# Patient Record
Sex: FEMALE | Race: WHITE | Employment: UNEMPLOYED | URBAN - METROPOLITAN AREA
[De-identification: names, ages, dates, MRNs, and addresses within clinical notes are randomized per-mention and may not be internally consistent; named-entity substitution may affect disease eponyms.]

---

## 2017-01-04 ENCOUNTER — TELEPHONE (OUTPATIENT)
Dept: ONCOLOGY | Facility: CLINIC | Age: 77
End: 2017-01-04

## 2017-01-04 RX ORDER — FUROSEMIDE 40 MG/1
40 TABLET ORAL 2 TIMES DAILY
Qty: 180 TABLET | Refills: 3 | Status: SHIPPED | OUTPATIENT
Start: 2017-01-04 | End: 2017-12-20 | Stop reason: SDUPTHER

## 2017-01-19 DIAGNOSIS — E11.40 TYPE 2 DIABETES MELLITUS WITH DIABETIC NEUROPATHY (HCC): ICD-10-CM

## 2017-01-23 ENCOUNTER — OFFICE VISIT (OUTPATIENT)
Dept: INTERNAL MEDICINE | Facility: CLINIC | Age: 77
End: 2017-01-23

## 2017-01-23 ENCOUNTER — CARE COORDINATION (OUTPATIENT)
Dept: CARE COORDINATION | Facility: CLINIC | Age: 77
End: 2017-01-23

## 2017-01-23 VITALS
BODY MASS INDEX: 27.29 KG/M2 | WEIGHT: 154 LBS | HEIGHT: 63 IN | DIASTOLIC BLOOD PRESSURE: 64 MMHG | RESPIRATION RATE: 14 BRPM | SYSTOLIC BLOOD PRESSURE: 100 MMHG

## 2017-01-23 DIAGNOSIS — E08.21 DIABETIC NEPHROPATHY ASSOCIATED WITH DIABETES MELLITUS DUE TO UNDERLYING CONDITION (HCC): ICD-10-CM

## 2017-01-23 DIAGNOSIS — E08.40 DIABETES MELLITUS DUE TO UNDERLYING CONDITION WITH DIABETIC NEUROPATHY, WITHOUT LONG-TERM CURRENT USE OF INSULIN (HCC): ICD-10-CM

## 2017-01-23 DIAGNOSIS — I48.20 CHRONIC ATRIAL FIBRILLATION (HCC): ICD-10-CM

## 2017-01-23 DIAGNOSIS — I50.42 HEART FAILURE, SYSTOLIC AND DIASTOLIC, CHRONIC (HCC): ICD-10-CM

## 2017-01-23 PROCEDURE — G8484 FLU IMMUNIZE NO ADMIN: HCPCS | Performed by: INTERNAL MEDICINE

## 2017-01-23 PROCEDURE — 1036F TOBACCO NON-USER: CPT | Performed by: INTERNAL MEDICINE

## 2017-01-23 PROCEDURE — 1090F PRES/ABSN URINE INCON ASSESS: CPT | Performed by: INTERNAL MEDICINE

## 2017-01-23 PROCEDURE — 4040F PNEUMOC VAC/ADMIN/RCVD: CPT | Performed by: INTERNAL MEDICINE

## 2017-01-23 PROCEDURE — 99213 OFFICE O/P EST LOW 20 MIN: CPT | Performed by: INTERNAL MEDICINE

## 2017-01-23 PROCEDURE — G8427 DOCREV CUR MEDS BY ELIG CLIN: HCPCS | Performed by: INTERNAL MEDICINE

## 2017-01-23 PROCEDURE — G8420 CALC BMI NORM PARAMETERS: HCPCS | Performed by: INTERNAL MEDICINE

## 2017-01-23 PROCEDURE — G8598 ASA/ANTIPLAT THER USED: HCPCS | Performed by: INTERNAL MEDICINE

## 2017-01-23 PROCEDURE — G8399 PT W/DXA RESULTS DOCUMENT: HCPCS | Performed by: INTERNAL MEDICINE

## 2017-01-23 PROCEDURE — 1123F ACP DISCUSS/DSCN MKR DOCD: CPT | Performed by: INTERNAL MEDICINE

## 2017-01-23 RX ORDER — AZITHROMYCIN 250 MG/1
TABLET, FILM COATED ORAL
Qty: 1 PACKET | Refills: 0 | Status: SHIPPED | OUTPATIENT
Start: 2017-01-23 | End: 2017-02-02

## 2017-01-23 RX ORDER — METOPROLOL SUCCINATE 25 MG/1
TABLET, EXTENDED RELEASE ORAL
COMMUNITY
Start: 2016-11-17 | End: 2017-08-17 | Stop reason: SDUPTHER

## 2017-01-23 RX ORDER — CEFUROXIME AXETIL 250 MG/1
250 TABLET ORAL 2 TIMES DAILY
Qty: 20 TABLET | Refills: 0 | Status: SHIPPED | OUTPATIENT
Start: 2017-01-23 | End: 2017-02-02

## 2017-01-23 ASSESSMENT — ENCOUNTER SYMPTOMS
EYES NEGATIVE: 1
ALLERGIC/IMMUNOLOGIC NEGATIVE: 1
SHORTNESS OF BREATH: 1

## 2017-02-21 ENCOUNTER — TELEPHONE (OUTPATIENT)
Dept: ONCOLOGY | Facility: CLINIC | Age: 77
End: 2017-02-21

## 2017-03-23 RX ORDER — SPIRONOLACTONE 25 MG/1
TABLET ORAL
Qty: 90 TABLET | Refills: 1 | Status: SHIPPED | OUTPATIENT
Start: 2017-03-23 | End: 2017-09-09 | Stop reason: SDUPTHER

## 2017-03-29 ENCOUNTER — CARE COORDINATION (OUTPATIENT)
Dept: CARE COORDINATION | Age: 77
End: 2017-03-29

## 2017-04-17 ENCOUNTER — OFFICE VISIT (OUTPATIENT)
Dept: INTERNAL MEDICINE CLINIC | Age: 77
End: 2017-04-17
Payer: MEDICARE

## 2017-04-17 ENCOUNTER — HOSPITAL ENCOUNTER (OUTPATIENT)
Age: 77
Setting detail: SPECIMEN
Discharge: HOME | End: 2017-04-17
Payer: MEDICARE

## 2017-04-17 VITALS
DIASTOLIC BLOOD PRESSURE: 62 MMHG | SYSTOLIC BLOOD PRESSURE: 100 MMHG | BODY MASS INDEX: 28.53 KG/M2 | RESPIRATION RATE: 14 BRPM | HEIGHT: 63 IN | WEIGHT: 161 LBS

## 2017-04-17 DIAGNOSIS — E11.40 TYPE 2 DIABETES MELLITUS WITH DIABETIC NEUROPATHY, WITHOUT LONG-TERM CURRENT USE OF INSULIN (HCC): Primary | ICD-10-CM

## 2017-04-17 DIAGNOSIS — I48.20 CHRONIC ATRIAL FIBRILLATION (HCC): ICD-10-CM

## 2017-04-17 DIAGNOSIS — I42.9 IDIOPATHIC CARDIOMYOPATHY (HCC): ICD-10-CM

## 2017-04-17 DIAGNOSIS — Z23 NEED FOR VACCINATION: ICD-10-CM

## 2017-04-17 PROBLEM — I48.91 ATRIAL FIBRILLATION (HCC): Status: ACTIVE | Noted: 2017-04-17

## 2017-04-17 LAB
CREATININE URINE: 59.3 MG/DL (ref 28–217)
MICROALBUMIN/CREAT 24H UR: 63 MG/L
MICROALBUMIN/CREAT UR-RTO: 106 MCG/MG CREAT

## 2017-04-17 PROCEDURE — G8598 ASA/ANTIPLAT THER USED: HCPCS | Performed by: INTERNAL MEDICINE

## 2017-04-17 PROCEDURE — G8420 CALC BMI NORM PARAMETERS: HCPCS | Performed by: INTERNAL MEDICINE

## 2017-04-17 PROCEDURE — 1036F TOBACCO NON-USER: CPT | Performed by: INTERNAL MEDICINE

## 2017-04-17 PROCEDURE — 1090F PRES/ABSN URINE INCON ASSESS: CPT | Performed by: INTERNAL MEDICINE

## 2017-04-17 PROCEDURE — 1123F ACP DISCUSS/DSCN MKR DOCD: CPT | Performed by: INTERNAL MEDICINE

## 2017-04-17 PROCEDURE — G0009 ADMIN PNEUMOCOCCAL VACCINE: HCPCS | Performed by: INTERNAL MEDICINE

## 2017-04-17 PROCEDURE — 99214 OFFICE O/P EST MOD 30 MIN: CPT | Performed by: INTERNAL MEDICINE

## 2017-04-17 PROCEDURE — 4040F PNEUMOC VAC/ADMIN/RCVD: CPT | Performed by: INTERNAL MEDICINE

## 2017-04-17 PROCEDURE — G8427 DOCREV CUR MEDS BY ELIG CLIN: HCPCS | Performed by: INTERNAL MEDICINE

## 2017-04-17 PROCEDURE — G8399 PT W/DXA RESULTS DOCUMENT: HCPCS | Performed by: INTERNAL MEDICINE

## 2017-04-17 PROCEDURE — 90670 PCV13 VACCINE IM: CPT | Performed by: INTERNAL MEDICINE

## 2017-04-17 RX ORDER — GLYBURIDE 2.5 MG/1
2.5 TABLET ORAL
Qty: 90 TABLET | Refills: 5 | Status: SHIPPED | OUTPATIENT
Start: 2017-04-17 | End: 2017-12-19 | Stop reason: ALTCHOICE

## 2017-04-17 ASSESSMENT — ENCOUNTER SYMPTOMS
SHORTNESS OF BREATH: 1
EYES NEGATIVE: 1
ALLERGIC/IMMUNOLOGIC NEGATIVE: 1

## 2017-04-26 ENCOUNTER — TELEPHONE (OUTPATIENT)
Dept: ONCOLOGY | Age: 77
End: 2017-04-26

## 2017-06-14 ENCOUNTER — CARE COORDINATION (OUTPATIENT)
Dept: CARE COORDINATION | Age: 77
End: 2017-06-14

## 2017-08-01 ENCOUNTER — OFFICE VISIT (OUTPATIENT)
Dept: INTERNAL MEDICINE CLINIC | Age: 77
End: 2017-08-01
Payer: MEDICARE

## 2017-08-01 VITALS
WEIGHT: 165 LBS | DIASTOLIC BLOOD PRESSURE: 62 MMHG | HEIGHT: 63 IN | BODY MASS INDEX: 29.23 KG/M2 | SYSTOLIC BLOOD PRESSURE: 104 MMHG

## 2017-08-01 DIAGNOSIS — I25.10 CORONARY ARTERY DISEASE INVOLVING NATIVE CORONARY ARTERY OF NATIVE HEART WITHOUT ANGINA PECTORIS: ICD-10-CM

## 2017-08-01 DIAGNOSIS — E11.40 TYPE 2 DIABETES MELLITUS WITH DIABETIC NEUROPATHY, WITHOUT LONG-TERM CURRENT USE OF INSULIN (HCC): Primary | ICD-10-CM

## 2017-08-01 DIAGNOSIS — N18.30 CKD (CHRONIC KIDNEY DISEASE) STAGE 3, GFR 30-59 ML/MIN (HCC): ICD-10-CM

## 2017-08-01 DIAGNOSIS — I50.42 CHRONIC COMBINED SYSTOLIC AND DIASTOLIC CONGESTIVE HEART FAILURE (HCC): ICD-10-CM

## 2017-08-01 PROCEDURE — 1036F TOBACCO NON-USER: CPT | Performed by: INTERNAL MEDICINE

## 2017-08-01 PROCEDURE — G8419 CALC BMI OUT NRM PARAM NOF/U: HCPCS | Performed by: INTERNAL MEDICINE

## 2017-08-01 PROCEDURE — G8427 DOCREV CUR MEDS BY ELIG CLIN: HCPCS | Performed by: INTERNAL MEDICINE

## 2017-08-01 PROCEDURE — 99214 OFFICE O/P EST MOD 30 MIN: CPT | Performed by: INTERNAL MEDICINE

## 2017-08-01 PROCEDURE — 1123F ACP DISCUSS/DSCN MKR DOCD: CPT | Performed by: INTERNAL MEDICINE

## 2017-08-01 PROCEDURE — 4040F PNEUMOC VAC/ADMIN/RCVD: CPT | Performed by: INTERNAL MEDICINE

## 2017-08-01 PROCEDURE — G8399 PT W/DXA RESULTS DOCUMENT: HCPCS | Performed by: INTERNAL MEDICINE

## 2017-08-01 PROCEDURE — G8598 ASA/ANTIPLAT THER USED: HCPCS | Performed by: INTERNAL MEDICINE

## 2017-08-01 PROCEDURE — 1090F PRES/ABSN URINE INCON ASSESS: CPT | Performed by: INTERNAL MEDICINE

## 2017-08-01 ASSESSMENT — ENCOUNTER SYMPTOMS
SHORTNESS OF BREATH: 1
ALLERGIC/IMMUNOLOGIC NEGATIVE: 1
EYES NEGATIVE: 1

## 2017-08-01 ASSESSMENT — PATIENT HEALTH QUESTIONNAIRE - PHQ9
2. FEELING DOWN, DEPRESSED OR HOPELESS: 0
SUM OF ALL RESPONSES TO PHQ9 QUESTIONS 1 & 2: 0
1. LITTLE INTEREST OR PLEASURE IN DOING THINGS: 0
SUM OF ALL RESPONSES TO PHQ QUESTIONS 1-9: 0

## 2017-08-14 ENCOUNTER — CARE COORDINATION (OUTPATIENT)
Dept: CARE COORDINATION | Age: 77
End: 2017-08-14

## 2017-08-17 RX ORDER — METOPROLOL SUCCINATE 25 MG/1
25 TABLET, EXTENDED RELEASE ORAL DAILY
Qty: 90 TABLET | Refills: 3 | Status: SHIPPED | OUTPATIENT
Start: 2017-08-17 | End: 2017-10-23 | Stop reason: SDUPTHER

## 2017-09-11 RX ORDER — SPIRONOLACTONE 25 MG/1
TABLET ORAL
Qty: 90 TABLET | Refills: 3 | Status: ON HOLD | OUTPATIENT
Start: 2017-09-11 | End: 2020-01-01 | Stop reason: HOSPADM

## 2017-09-25 ENCOUNTER — CARE COORDINATION (OUTPATIENT)
Dept: CARE COORDINATION | Age: 77
End: 2017-09-25

## 2017-09-26 ENCOUNTER — OFFICE VISIT (OUTPATIENT)
Dept: INTERNAL MEDICINE CLINIC | Age: 77
End: 2017-09-26
Payer: MEDICARE

## 2017-09-26 VITALS
WEIGHT: 166 LBS | HEIGHT: 63 IN | SYSTOLIC BLOOD PRESSURE: 122 MMHG | DIASTOLIC BLOOD PRESSURE: 84 MMHG | BODY MASS INDEX: 29.41 KG/M2

## 2017-09-26 DIAGNOSIS — M54.50 ACUTE RIGHT-SIDED LOW BACK PAIN WITHOUT SCIATICA: Primary | ICD-10-CM

## 2017-09-26 DIAGNOSIS — E03.9 ACQUIRED HYPOTHYROIDISM: ICD-10-CM

## 2017-09-26 DIAGNOSIS — I48.20 CHRONIC ATRIAL FIBRILLATION (HCC): ICD-10-CM

## 2017-09-26 DIAGNOSIS — I50.43 ACUTE ON CHRONIC COMBINED SYSTOLIC AND DIASTOLIC CONGESTIVE HEART FAILURE (HCC): ICD-10-CM

## 2017-09-26 PROCEDURE — G8417 CALC BMI ABV UP PARAM F/U: HCPCS | Performed by: INTERNAL MEDICINE

## 2017-09-26 PROCEDURE — 1090F PRES/ABSN URINE INCON ASSESS: CPT | Performed by: INTERNAL MEDICINE

## 2017-09-26 PROCEDURE — G8598 ASA/ANTIPLAT THER USED: HCPCS | Performed by: INTERNAL MEDICINE

## 2017-09-26 PROCEDURE — G8399 PT W/DXA RESULTS DOCUMENT: HCPCS | Performed by: INTERNAL MEDICINE

## 2017-09-26 PROCEDURE — 99214 OFFICE O/P EST MOD 30 MIN: CPT | Performed by: INTERNAL MEDICINE

## 2017-09-26 PROCEDURE — 4040F PNEUMOC VAC/ADMIN/RCVD: CPT | Performed by: INTERNAL MEDICINE

## 2017-09-26 PROCEDURE — G8427 DOCREV CUR MEDS BY ELIG CLIN: HCPCS | Performed by: INTERNAL MEDICINE

## 2017-09-26 PROCEDURE — 1123F ACP DISCUSS/DSCN MKR DOCD: CPT | Performed by: INTERNAL MEDICINE

## 2017-09-26 PROCEDURE — 1036F TOBACCO NON-USER: CPT | Performed by: INTERNAL MEDICINE

## 2017-09-26 ASSESSMENT — ENCOUNTER SYMPTOMS
SHORTNESS OF BREATH: 1
BACK PAIN: 1
ALLERGIC/IMMUNOLOGIC NEGATIVE: 1
EYES NEGATIVE: 1

## 2017-09-27 ENCOUNTER — CARE COORDINATION (OUTPATIENT)
Dept: CARE COORDINATION | Age: 77
End: 2017-09-27

## 2017-10-24 RX ORDER — METOPROLOL SUCCINATE 25 MG/1
25 TABLET, EXTENDED RELEASE ORAL DAILY
Qty: 90 TABLET | Refills: 3 | Status: SHIPPED | OUTPATIENT
Start: 2017-10-24 | End: 2018-10-01 | Stop reason: SDUPTHER

## 2017-10-31 RX ORDER — LEVOTHYROXINE SODIUM 0.05 MG/1
TABLET ORAL
Qty: 90 TABLET | Refills: 5 | Status: SHIPPED | OUTPATIENT
Start: 2017-10-31 | End: 2019-05-16 | Stop reason: SDUPTHER

## 2017-11-07 ENCOUNTER — OFFICE VISIT (OUTPATIENT)
Dept: INTERNAL MEDICINE CLINIC | Age: 77
End: 2017-11-07
Payer: MEDICARE

## 2017-11-07 ENCOUNTER — CARE COORDINATION (OUTPATIENT)
Dept: CARE COORDINATION | Age: 77
End: 2017-11-07

## 2017-11-07 VITALS
BODY MASS INDEX: 29.59 KG/M2 | HEIGHT: 63 IN | SYSTOLIC BLOOD PRESSURE: 114 MMHG | DIASTOLIC BLOOD PRESSURE: 72 MMHG | WEIGHT: 167 LBS

## 2017-11-07 DIAGNOSIS — K21.9 GASTROESOPHAGEAL REFLUX DISEASE WITHOUT ESOPHAGITIS: Primary | ICD-10-CM

## 2017-11-07 DIAGNOSIS — I42.9 IDIOPATHIC CARDIOMYOPATHY (HCC): ICD-10-CM

## 2017-11-07 DIAGNOSIS — I25.10 CORONARY ARTERY DISEASE INVOLVING NATIVE CORONARY ARTERY OF NATIVE HEART WITHOUT ANGINA PECTORIS: ICD-10-CM

## 2017-11-07 PROCEDURE — G8417 CALC BMI ABV UP PARAM F/U: HCPCS | Performed by: INTERNAL MEDICINE

## 2017-11-07 PROCEDURE — G8484 FLU IMMUNIZE NO ADMIN: HCPCS | Performed by: INTERNAL MEDICINE

## 2017-11-07 PROCEDURE — G8399 PT W/DXA RESULTS DOCUMENT: HCPCS | Performed by: INTERNAL MEDICINE

## 2017-11-07 PROCEDURE — 99214 OFFICE O/P EST MOD 30 MIN: CPT | Performed by: INTERNAL MEDICINE

## 2017-11-07 PROCEDURE — G8598 ASA/ANTIPLAT THER USED: HCPCS | Performed by: INTERNAL MEDICINE

## 2017-11-07 PROCEDURE — 4040F PNEUMOC VAC/ADMIN/RCVD: CPT | Performed by: INTERNAL MEDICINE

## 2017-11-07 PROCEDURE — 1036F TOBACCO NON-USER: CPT | Performed by: INTERNAL MEDICINE

## 2017-11-07 PROCEDURE — 1090F PRES/ABSN URINE INCON ASSESS: CPT | Performed by: INTERNAL MEDICINE

## 2017-11-07 PROCEDURE — 1123F ACP DISCUSS/DSCN MKR DOCD: CPT | Performed by: INTERNAL MEDICINE

## 2017-11-07 PROCEDURE — G8427 DOCREV CUR MEDS BY ELIG CLIN: HCPCS | Performed by: INTERNAL MEDICINE

## 2017-11-07 ASSESSMENT — ENCOUNTER SYMPTOMS
EYES NEGATIVE: 1
SHORTNESS OF BREATH: 1
ALLERGIC/IMMUNOLOGIC NEGATIVE: 1

## 2017-11-07 NOTE — PROGRESS NOTES
Neck: Neck supple. No hepatojugular reflux present. Carotid bruit is not present. No thyroid mass and no thyromegaly present. Cardiovascular: Normal rate, regular rhythm and normal heart sounds. Exam reveals no S3. No murmur heard. Pulmonary/Chest: Effort normal and breath sounds normal. She has no wheezes. She has no rales. Abdominal: Soft. Musculoskeletal: Normal range of motion. She exhibits no edema or tenderness. Lymphadenopathy:     She has no cervical adenopathy. She has no axillary adenopathy. Right: No supraclavicular and no epitrochlear adenopathy present. Left: No supraclavicular and no epitrochlear adenopathy present. Neurological: She is alert and oriented to person, place, and time. She displays no atrophy and no tremor. No cranial nerve deficit. She exhibits normal muscle tone. She displays no seizure activity. Coordination and gait normal.   Skin: Skin is warm and dry. No bruising, no laceration, no petechiae and no rash noted. She is not diaphoretic. No cyanosis or erythema. No pallor. Nails show no clubbing. Bruise right calf   Psychiatric: She has a normal mood and affect. Her speech is normal and behavior is normal. Judgment and thought content normal. Cognition and memory are normal.   Vitals reviewed. Assessment / Plan:     1. Gastroesophageal reflux disease without esophagitis     2. Coronary artery disease involving native coronary artery of native heart without angina pectoris     3. Idiopathic cardiomyopathy ([facility] [email or web address]       Return in about 8 weeks (around 1/2/2018) for Follow Up, diabetes mellitus. No orders of the defined types were placed in this encounter. No orders of the defined types were placed in this encounter.

## 2017-11-07 NOTE — CARE COORDINATION
Admission medications    Medication Sig Start Date End Date Taking? Authorizing Provider   levothyroxine (SYNTHROID) 50 MCG tablet TAKE 1 TABLET EVERY MORNING 10/31/17   Nini Multani MD   metoprolol succinate (TOPROL XL) 25 MG extended release tablet Take 1 tablet by mouth daily 10/24/17   Nini Multani MD   spironolactone (ALDACTONE) 25 MG tablet TAKE 1 TABLET EVERY MORNING 9/11/17   Nini Multani MD   glyBURIDE (DIABETA) 2.5 MG tablet Take 1 tablet by mouth daily (with breakfast) 4/17/17   Nini Multani MD   metFORMIN (GLUCOPHAGE) 500 MG tablet TAKE ONE-HALF (1/2) TABLET TWICE A DAY 1/19/17   Nini Multani MD   furosemide (LASIX) 40 MG tablet Take 1 tablet by mouth 2 times daily 1/4/17   Nini Multani MD   warfarin (COUMADIN) 3 MG tablet TAKE 1 TABLET EVERY OTHER DAY AS DIRECTED 12/13/16   Nini Multani MD   sotalol (BETAPACE) 80 MG tablet Take 0.5 tablets by mouth 2 times daily 12/13/16   Nini Multani MD   warfarin (COUMADIN) 1 MG tablet Take 2 tablets by mouth daily 8/31/16   Zach Zaragoza MD   docusate sodium (COLACE) 100 MG capsule Take 100 mg by mouth every morning     Historical Provider, MD   carvedilol (COREG) 3.125 MG tablet Take 1 tablet by mouth 2 times daily 6/2/16   Nini Multani MD   aspirin 81 MG tablet Take 81 mg by mouth every morning     Historical Provider, MD       No future appointments.      Cristi Galvez RN  Ambulatory Care Coordinator

## 2017-12-04 DIAGNOSIS — Z00.00 ROUTINE HEALTH MAINTENANCE: ICD-10-CM

## 2017-12-04 DIAGNOSIS — Z12.31 ENCOUNTER FOR SCREENING MAMMOGRAM FOR MALIGNANT NEOPLASM OF BREAST: ICD-10-CM

## 2017-12-04 DIAGNOSIS — Z85.3 PERSONAL HISTORY OF BREAST CANCER: Primary | ICD-10-CM

## 2017-12-11 RX ORDER — WARFARIN SODIUM 1 MG/1
2 TABLET ORAL EVERY OTHER DAY
Qty: 90 TABLET | Refills: 3 | Status: SHIPPED | OUTPATIENT
Start: 2017-12-11 | End: 2018-10-23 | Stop reason: SDUPTHER

## 2017-12-11 NOTE — TELEPHONE ENCOUNTER
Medication: warfarin 2mg QOD per patient  Last visit: 11/7/17  Next visit: 1/23/2018  Last refill: 8/2016  Pharmacy: [facility]

## 2017-12-19 ENCOUNTER — TELEPHONE (OUTPATIENT)
Dept: INTERNAL MEDICINE CLINIC | Age: 77
End: 2017-12-19

## 2017-12-19 RX ORDER — GLIMEPIRIDE 2 MG/1
2 TABLET ORAL
Qty: 90 TABLET | Refills: 3 | Status: SHIPPED | OUTPATIENT
Start: 2017-12-19 | End: 2018-11-07 | Stop reason: SDUPTHER

## 2017-12-19 NOTE — TELEPHONE ENCOUNTER
Patient called, her insurance is no longer going to cover glyburide. They would like to switch to glimepiride.   Please advise    Will need new rx sent to [facility]

## 2017-12-20 ENCOUNTER — OFFICE VISIT (OUTPATIENT)
Dept: INTERNAL MEDICINE CLINIC | Age: 77
End: 2017-12-20
Payer: MEDICARE

## 2017-12-20 ENCOUNTER — CARE COORDINATION (OUTPATIENT)
Dept: CARE COORDINATION | Age: 77
End: 2017-12-20

## 2017-12-20 VITALS
HEART RATE: 69 BPM | OXYGEN SATURATION: 94 % | DIASTOLIC BLOOD PRESSURE: 60 MMHG | BODY MASS INDEX: 29.59 KG/M2 | SYSTOLIC BLOOD PRESSURE: 104 MMHG | WEIGHT: 167 LBS | HEIGHT: 63 IN

## 2017-12-20 DIAGNOSIS — J04.0 ACUTE LARYNGITIS: ICD-10-CM

## 2017-12-20 DIAGNOSIS — E03.9 ACQUIRED HYPOTHYROIDISM: ICD-10-CM

## 2017-12-20 DIAGNOSIS — I50.43 ACUTE ON CHRONIC COMBINED SYSTOLIC AND DIASTOLIC CONGESTIVE HEART FAILURE (HCC): Primary | ICD-10-CM

## 2017-12-20 PROBLEM — J06.9 URI (UPPER RESPIRATORY INFECTION): Status: ACTIVE | Noted: 2017-12-20

## 2017-12-20 PROCEDURE — G8427 DOCREV CUR MEDS BY ELIG CLIN: HCPCS | Performed by: INTERNAL MEDICINE

## 2017-12-20 PROCEDURE — 4040F PNEUMOC VAC/ADMIN/RCVD: CPT | Performed by: INTERNAL MEDICINE

## 2017-12-20 PROCEDURE — G8484 FLU IMMUNIZE NO ADMIN: HCPCS | Performed by: INTERNAL MEDICINE

## 2017-12-20 PROCEDURE — G8598 ASA/ANTIPLAT THER USED: HCPCS | Performed by: INTERNAL MEDICINE

## 2017-12-20 PROCEDURE — 99214 OFFICE O/P EST MOD 30 MIN: CPT | Performed by: INTERNAL MEDICINE

## 2017-12-20 PROCEDURE — G8417 CALC BMI ABV UP PARAM F/U: HCPCS | Performed by: INTERNAL MEDICINE

## 2017-12-20 PROCEDURE — 1123F ACP DISCUSS/DSCN MKR DOCD: CPT | Performed by: INTERNAL MEDICINE

## 2017-12-20 PROCEDURE — 1036F TOBACCO NON-USER: CPT | Performed by: INTERNAL MEDICINE

## 2017-12-20 PROCEDURE — G8399 PT W/DXA RESULTS DOCUMENT: HCPCS | Performed by: INTERNAL MEDICINE

## 2017-12-20 PROCEDURE — 1090F PRES/ABSN URINE INCON ASSESS: CPT | Performed by: INTERNAL MEDICINE

## 2017-12-20 RX ORDER — FUROSEMIDE 40 MG/1
TABLET ORAL
Qty: 180 TABLET | Refills: 3 | Status: SHIPPED | OUTPATIENT
Start: 2017-12-20 | End: 2018-11-06 | Stop reason: SDUPTHER

## 2017-12-20 RX ORDER — CEFUROXIME AXETIL 250 MG/1
250 TABLET ORAL 2 TIMES DAILY
Qty: 20 TABLET | Refills: 0 | Status: SHIPPED | OUTPATIENT
Start: 2017-12-20 | End: 2018-02-20 | Stop reason: ALTCHOICE

## 2017-12-20 ASSESSMENT — ENCOUNTER SYMPTOMS
SHORTNESS OF BREATH: 1
SORE THROAT: 1
COUGH: 1
SINUS PAIN: 1
EYES NEGATIVE: 1
ALLERGIC/IMMUNOLOGIC NEGATIVE: 1

## 2017-12-20 NOTE — CARE COORDINATION
Ambulatory Care Coordination Note  12/20/2017  CM Risk Score: 3  Brandon Mortality Risk Score: 23.1    ACC: Tawanna He RN    Summary Note: RNCC reviewed chart and encounters, noted Mrs. Ten Albarran had same day visit with Dr. Ameena Lomeli today for congestion, heart failure follow up. New medication Ceftin to start today, RNCC will follow up with Ihsan Garcia in 1 week for outcome/follow up. Care Coordination Interventions    Program Enrollment:  Complex Care  Referral from Primary Care Provider:  Yes  Suggested Interventions and Community Resources  No Identified Needs  Cardiac Rehab:  Completed  Diabetes Education:  Completed  Home Health Services:  Completed  [employer] Management Tools:  Completed (Comment: CHF and DM handouts.)  Other Services or Interventions:  goes to [address] and [address] [place] [address]. Goals Addressed     None          Prior to Admission medications    Medication Sig Start Date End Date Taking?  Authorizing Provider   cefUROXime (CEFTIN) 250 MG tablet Take 1 tablet by mouth 2 times daily 12/20/17   Lisseth Ramos MD   glimepiride (AMARYL) 2 MG tablet Take 1 tablet by mouth every morning (before breakfast) 12/19/17   Lisseth Ramos MD   warfarin (COUMADIN) 1 MG tablet Take 2 tablets by mouth every other day 12/11/17   Lisseth Ramos MD   levothyroxine (SYNTHROID) 50 MCG tablet TAKE 1 TABLET EVERY MORNING 10/31/17   Lisseth Ramos MD   metoprolol succinate (TOPROL XL) 25 MG extended release tablet Take 1 tablet by mouth daily 10/24/17   Lisseth Ramos MD   spironolactone (ALDACTONE) 25 MG tablet TAKE 1 TABLET EVERY MORNING 9/11/17   Lisseth Ramos MD   metFORMIN (GLUCOPHAGE) 500 MG tablet TAKE ONE-HALF (1/2) TABLET TWICE A DAY 1/19/17   Lisseth Ramos MD   furosemide (LASIX) 40 MG tablet Take 1 tablet by mouth 2 times daily 1/4/17   Lisseth Ramos MD   warfarin (COUMADIN) 3 MG tablet TAKE 1 TABLET EVERY OTHER DAY AS DIRECTED 12/13/16   Cordelia Eduardo Dylan Ellis MD   sotalol (BETAPACE) 80 MG tablet Take 0.5 tablets by mouth 2 times daily 12/13/16   Adam Hagan MD   docusate sodium (COLACE) 100 MG capsule Take 100 mg by mouth every morning     Historical Provider, MD   carvedilol (COREG) 3.125 MG tablet Take 1 tablet by mouth 2 times daily 6/2/16   Adam Hagan MD   aspirin 81 MG tablet Take 81 mg by mouth every morning     Historical Provider, MD       Future Appointments  Date Time Provider Russ Cardona   1/23/2018 1:30 PM Adam Hagan MD [facility]

## 2017-12-20 NOTE — PROGRESS NOTES
Right: No supraclavicular and no epitrochlear adenopathy present. Left: No supraclavicular and no epitrochlear adenopathy present. Neurological: She is alert and oriented to person, place, and time. She displays no atrophy and no tremor. No cranial nerve deficit. She exhibits normal muscle tone. She displays no seizure activity. Coordination and gait normal.   Skin: Skin is warm and dry. No bruising, no laceration, no petechiae and no rash noted. She is not diaphoretic. No cyanosis or erythema. No pallor. Nails show no clubbing. Bruise right calf   Psychiatric: She has a normal mood and affect. Her speech is normal and behavior is normal. Judgment and thought content normal. Cognition and memory are normal.   Vitals reviewed. Assessment / Plan:     1. Acute on chronic combined systolic and diastolic congestive heart failure ([address]     2. Acute laryngitis     3. Acquired hypothyroidism       Return previous, for Follow Up. No orders of the defined types were placed in this encounter.     Orders Placed This Encounter   Medications    cefUROXime (CEFTIN) 250 MG tablet     Sig: Take 1 tablet by mouth 2 times daily     Dispense:  20 tablet     Refill:  0

## 2017-12-26 ENCOUNTER — CARE COORDINATION (OUTPATIENT)
Dept: CARE COORDINATION | Age: 77
End: 2017-12-26

## 2017-12-29 RX ORDER — SOTALOL HYDROCHLORIDE 80 MG/1
TABLET ORAL
Qty: 90 TABLET | Refills: 3 | Status: ON HOLD | OUTPATIENT
Start: 2017-12-29 | End: 2020-01-01 | Stop reason: HOSPADM

## 2018-01-03 ENCOUNTER — CARE COORDINATION (OUTPATIENT)
Dept: CARE COORDINATION | Age: 78
End: 2018-01-03

## 2018-01-03 NOTE — CARE COORDINATION
spironolactone (ALDACTONE) 25 MG tablet TAKE 1 TABLET EVERY MORNING 9/11/17   Anne-Marie Loaiza MD   metFORMIN (GLUCOPHAGE) 500 MG tablet TAKE ONE-HALF (1/2) TABLET TWICE A DAY 1/19/17   Anne-Marie Loaiza MD   warfarin (COUMADIN) 3 MG tablet TAKE 1 TABLET EVERY OTHER DAY AS DIRECTED 12/13/16   Anne-Marie Loaiza MD   docusate sodium (COLACE) 100 MG capsule Take 100 mg by mouth every morning     Historical Provider, MD   carvedilol (COREG) 3.125 MG tablet Take 1 tablet by mouth 2 times daily 6/2/16   Anne-Marie Loaiza MD   aspirin 81 MG tablet Take 81 mg by mouth every morning     Historical Provider, MD       No future appointments. General Assessment    Do you have any symptoms that are causing concern?:  No      and   Congestive Heart Failure Assessment    Are you currently restricting fluids?:  1800cc  Do you understand a low sodium diet?:  Yes  Do you understand how to read food labels?:  Yes  How many restaurant meals do you eat per week?:  1-2  Do you salt your food before tasting it?:  No         Symptoms:   None:  Yes      Symptom course:  stable  Weight trend:  stable  Salt intake watch compared to last visit:  stable     ,   Diabetes Assessment    Medic Alert ID:  No  Meal Planning:  Avoidance of concentrated sweets   How often do you test your blood sugar?:  Daily   Do you have barriers with adherence to non-pharmacologic self-management interventions?  (Nutrition/Exercise/Self-Monitoring):  No   Have you ever had to go to the ED for symptoms of low blood sugar?:  No       No patient-reported symptoms      ,

## 2018-01-24 ENCOUNTER — CARE COORDINATION (OUTPATIENT)
Dept: CARE COORDINATION | Age: 78
End: 2018-01-24

## 2018-01-29 RX ORDER — WARFARIN SODIUM 3 MG/1
TABLET ORAL
Qty: 90 TABLET | Refills: 2 | Status: ON HOLD | OUTPATIENT
Start: 2018-01-29 | End: 2020-01-01

## 2018-02-14 ENCOUNTER — CARE COORDINATION (OUTPATIENT)
Dept: CARE COORDINATION | Age: 78
End: 2018-02-14

## 2018-02-20 ENCOUNTER — OFFICE VISIT (OUTPATIENT)
Dept: INTERNAL MEDICINE CLINIC | Age: 78
End: 2018-02-20
Payer: MEDICARE

## 2018-02-20 ENCOUNTER — HOSPITAL ENCOUNTER (OUTPATIENT)
Age: 78
Setting detail: SPECIMEN
Discharge: HOME | End: 2018-02-20
Payer: MEDICARE

## 2018-02-20 VITALS
HEIGHT: 63 IN | DIASTOLIC BLOOD PRESSURE: 60 MMHG | OXYGEN SATURATION: 94 % | HEART RATE: 78 BPM | WEIGHT: 163 LBS | SYSTOLIC BLOOD PRESSURE: 110 MMHG | BODY MASS INDEX: 28.88 KG/M2

## 2018-02-20 DIAGNOSIS — I42.9 IDIOPATHIC CARDIOMYOPATHY (HCC): ICD-10-CM

## 2018-02-20 DIAGNOSIS — E08.21 DIABETIC NEPHROPATHY ASSOCIATED WITH DIABETES MELLITUS DUE TO UNDERLYING CONDITION (HCC): ICD-10-CM

## 2018-02-20 DIAGNOSIS — E11.40 TYPE 2 DIABETES MELLITUS WITH DIABETIC NEUROPATHY, WITHOUT LONG-TERM CURRENT USE OF INSULIN (HCC): Primary | ICD-10-CM

## 2018-02-20 DIAGNOSIS — I50.20 SYSTOLIC CONGESTIVE HEART FAILURE, UNSPECIFIED CONGESTIVE HEART FAILURE CHRONICITY: ICD-10-CM

## 2018-02-20 DIAGNOSIS — C50.912 MALIGNANT NEOPLASM OF LEFT FEMALE BREAST, UNSPECIFIED ESTROGEN RECEPTOR STATUS, UNSPECIFIED SITE OF BREAST (HCC): ICD-10-CM

## 2018-02-20 DIAGNOSIS — I48.20 CHRONIC ATRIAL FIBRILLATION (HCC): ICD-10-CM

## 2018-02-20 DIAGNOSIS — I50.42 CHRONIC COMBINED SYSTOLIC AND DIASTOLIC CONGESTIVE HEART FAILURE (HCC): ICD-10-CM

## 2018-02-20 DIAGNOSIS — E08.41 DIABETIC MONONEUROPATHY ASSOCIATED WITH DIABETES MELLITUS DUE TO UNDERLYING CONDITION (HCC): ICD-10-CM

## 2018-02-20 DIAGNOSIS — E11.40 TYPE 2 DIABETES MELLITUS WITH DIABETIC NEUROPATHY, WITHOUT LONG-TERM CURRENT USE OF INSULIN (HCC): ICD-10-CM

## 2018-02-20 DIAGNOSIS — I50.9 ACUTE ON CHRONIC CONGESTIVE HEART FAILURE, UNSPECIFIED CONGESTIVE HEART FAILURE TYPE: ICD-10-CM

## 2018-02-20 LAB
ESTIMATED AVERAGE GLUCOSE: 134 MG/DL
HBA1C MFR BLD: 6.3 % (ref 4–6)

## 2018-02-20 PROCEDURE — 1090F PRES/ABSN URINE INCON ASSESS: CPT | Performed by: INTERNAL MEDICINE

## 2018-02-20 PROCEDURE — G8598 ASA/ANTIPLAT THER USED: HCPCS | Performed by: INTERNAL MEDICINE

## 2018-02-20 PROCEDURE — 4040F PNEUMOC VAC/ADMIN/RCVD: CPT | Performed by: INTERNAL MEDICINE

## 2018-02-20 PROCEDURE — G8417 CALC BMI ABV UP PARAM F/U: HCPCS | Performed by: INTERNAL MEDICINE

## 2018-02-20 PROCEDURE — 1123F ACP DISCUSS/DSCN MKR DOCD: CPT | Performed by: INTERNAL MEDICINE

## 2018-02-20 PROCEDURE — G8427 DOCREV CUR MEDS BY ELIG CLIN: HCPCS | Performed by: INTERNAL MEDICINE

## 2018-02-20 PROCEDURE — G8484 FLU IMMUNIZE NO ADMIN: HCPCS | Performed by: INTERNAL MEDICINE

## 2018-02-20 PROCEDURE — 99214 OFFICE O/P EST MOD 30 MIN: CPT | Performed by: INTERNAL MEDICINE

## 2018-02-20 PROCEDURE — G8399 PT W/DXA RESULTS DOCUMENT: HCPCS | Performed by: INTERNAL MEDICINE

## 2018-02-20 PROCEDURE — 1036F TOBACCO NON-USER: CPT | Performed by: INTERNAL MEDICINE

## 2018-02-20 ASSESSMENT — ENCOUNTER SYMPTOMS
EYES NEGATIVE: 1
ALLERGIC/IMMUNOLOGIC NEGATIVE: 1
SHORTNESS OF BREATH: 1
CONSTIPATION: 1

## 2018-02-20 NOTE — PROGRESS NOTES
Subjective:      Patient ID: Gustavo Madison is a 65 y.o. female. Congestive Heart Failure   Presents for follow-up (She had acute heart failure systolic and diastolic. She was admitted to [facility] and had a new wire for her pacemaker and defibrillator) visit. Associated symptoms include fatigue and shortness of breath. Pertinent negatives include no chest pain, edema, muscle weakness, near-syncope, nocturia or orthopnea. The symptoms have been improving. Compliance with total regimen is 0-25%. Compliance problems include adherence to diet. Compliance with diet is %. Compliance with exercise is %. Review of Systems   Constitutional: Positive for fatigue. HENT: Positive for sneezing. Eyes: Negative. Respiratory: Positive for shortness of breath. Cardiovascular: Negative for chest pain and near-syncope. Gastrointestinal: Positive for constipation. Endocrine: Negative. Genitourinary: Negative. Negative for nocturia. Musculoskeletal: Negative for muscle weakness. Allergic/Immunologic: Negative. Neurological: Negative. Objective:   Physical Exam   Constitutional: She is oriented to person, place, and time. She appears well-developed and well-nourished. She is active and cooperative. She does not have a sickly appearance. No distress. HENT:   Head: Normocephalic and atraumatic. Head is without abrasion, without contusion and without laceration. Right Ear: External ear normal.   Left Ear: External ear normal.   Nose: No mucosal edema, nose lacerations or septal deviation. Mouth/Throat: Mucous membranes are not pale. No oropharyngeal exudate or posterior oropharyngeal edema. Eyes: EOM are normal. Pupils are equal, round, and reactive to light. Right eye exhibits no discharge. Left eye exhibits no discharge. No scleral icterus. Neck: Neck supple. No hepatojugular reflux present. Carotid bruit is not present. No thyroid mass and no thyromegaly present. Cardiovascular: Normal rate, regular rhythm and normal heart sounds. Exam reveals no S3. No murmur heard. Pulmonary/Chest: Effort normal and breath sounds normal. She has no wheezes. She has no rales. Abdominal: Soft. Musculoskeletal: Normal range of motion. She exhibits no edema or tenderness. Lymphadenopathy:     She has no cervical adenopathy. She has no axillary adenopathy. Right: No supraclavicular and no epitrochlear adenopathy present. Left: No supraclavicular and no epitrochlear adenopathy present. Neurological: She is alert and oriented to person, place, and time. She displays no atrophy and no tremor. No cranial nerve deficit. She exhibits normal muscle tone. She displays no seizure activity. Coordination and gait normal.   Skin: Skin is warm and dry. No bruising, no laceration, no petechiae and no rash noted. She is not diaphoretic. No cyanosis or erythema. No pallor. Nails show no clubbing. Bruise right calf   Psychiatric: She has a normal mood and affect. Her speech is normal and behavior is normal. Judgment and thought content normal. Cognition and memory are normal.   Vitals reviewed. Assessment:      1. Type 2 diabetes mellitus with diabetic neuropathy, without long-term current use of insulin ([facility])  Hemoglobin A1C    MICROALBUMIN, RANDOM URINE (W/O CREATININE)   2. Malignant neoplasm of left female breast, unspecified estrogen receptor status, unspecified site of breast ([address]     3. Systolic congestive heart failure, unspecified congestive heart failure chronicity ([address]     4. Idiopathic cardiomyopathy ([address]     5. Chronic combined systolic and diastolic congestive heart failure ([address]     6. Acute on chronic congestive heart failure, unspecified congestive heart failure type ([address]     7. Chronic atrial fibrillation (HCC)     8. Diabetic nephropathy associated with diabetes mellitus due to underlying condition ([address]     9.  Diabetic mononeuropathy associated

## 2018-02-20 NOTE — PROGRESS NOTES
Subjective:      Patient ID: Kwadwo Hutchinson is a 65 y.o. female. Chronic Disease Visit Information    BP Readings from Last 3 Encounters:   18 110/60   17 104/60   17 114/72          Hemoglobin A1C (%)   Date Value   2017 6.0   2016 7.2 (H)   2016 5.2     Microalb/Crt. Ratio (mcg/mg creat)   Date Value   2017 106 (H)     LDL Cholesterol (mg/dL)   Date Value   2014 154 (H)     HDL (mg/dL)   Date Value   2014 77     BUN (mg/dL)   Date Value   2017 18     CREATININE (mg/dL)   Date Value   2017 1.23 (H)     Glucose (mg/dL)   Date Value   2017 102 (H)   2012 122 (H)            Have you changed or started any medications since your last visit including any over-the-counter medicines, vitamins, or herbal medicines? no   Are you having any side effects from any of your medications? -  no  Have you stopped taking any of your medications? Is so, why? -  no    Have you seen any other physician or provider since your last visit? No  Have you had any other diagnostic tests since your last visit? Yes - Records Obtained  Have you been seen in the emergency room and/or had an admission to a hospital since we last saw you? No  Have you had your annual diabetic retinal (eye) exam? Yes - Records Obtained  Have you had your routine dental cleaning in the past 6 months? yes - 2017    Have you activated your [employer] account? If not, what are your barriers?  No: Code      Patient Care Team:  Rohit Watt MD as PCP - Escobar Sneed MD as PCP - [facility] Attributed Provider  Andrey Gonzalez MD as Consulting Physician (Internal Medicine Cardiovascular Disease)  Lazara Saavedra MD as Consulting Physician (Internal Medicine)  Berenice Cobos RN as Care Coordinator         Medical History Review  Past Medical, Family, and Social History reviewed and does not contribute to the patient presenting condition    Health Maintenance   Topic Date Due    TSH testing  01/23/2018    Potassium monitoring  01/23/2018    Creatinine monitoring  01/23/2018    DTaP/Tdap/Td vaccine (1 - Tdap) 11/07/2018 (Originally 9/14/1959)    Lipid screen  01/16/2019    Zostavax vaccine  Addressed    DEXA (modify frequency per FRAX score)  Completed    Flu vaccine  Completed    Pneumococcal low/med risk  Completed     Chief Complaint   Patient presents with    Diabetes     management pt due for A1C check       HPI    Review of Systems    Objective:   Physical Exam    Assessment / Plan:   1. Malignant neoplasm of left female breast, unspecified estrogen receptor status, unspecified site of breast ([address]      2. Systolic congestive heart failure, unspecified congestive heart failure chronicity ([address]      3. Idiopathic cardiomyopathy ([address]      4. Type 2 diabetes mellitus with diabetic neuropathy, without long-term current use of insulin ([address]      5. Chronic combined systolic and diastolic congestive heart failure ([address]      6. Acute on chronic congestive heart failure, unspecified congestive heart failure type ([address]      7.  Chronic atrial fibrillation (HCC)

## 2018-02-28 ENCOUNTER — CARE COORDINATION (OUTPATIENT)
Dept: CARE COORDINATION | Age: 78
End: 2018-02-28

## 2018-03-30 ENCOUNTER — CARE COORDINATION (OUTPATIENT)
Dept: CARE COORDINATION | Age: 78
End: 2018-03-30

## 2018-04-11 PROBLEM — J06.9 URI (UPPER RESPIRATORY INFECTION): Status: RESOLVED | Noted: 2017-12-20 | Resolved: 2018-04-11

## 2018-04-18 ENCOUNTER — CARE COORDINATION (OUTPATIENT)
Dept: CARE COORDINATION | Age: 78
End: 2018-04-18

## 2018-05-21 ENCOUNTER — CARE COORDINATION (OUTPATIENT)
Dept: CARE COORDINATION | Age: 78
End: 2018-05-21

## 2018-05-23 ENCOUNTER — OFFICE VISIT (OUTPATIENT)
Dept: INTERNAL MEDICINE CLINIC | Age: 78
End: 2018-05-23
Payer: MEDICARE

## 2018-05-23 VITALS
SYSTOLIC BLOOD PRESSURE: 110 MMHG | WEIGHT: 165 LBS | DIASTOLIC BLOOD PRESSURE: 80 MMHG | HEART RATE: 54 BPM | HEIGHT: 63 IN | BODY MASS INDEX: 29.23 KG/M2 | OXYGEN SATURATION: 98 %

## 2018-05-23 DIAGNOSIS — I48.20 CHRONIC ATRIAL FIBRILLATION (HCC): ICD-10-CM

## 2018-05-23 DIAGNOSIS — Z95.810 ICD (IMPLANTABLE CARDIOVERTER-DEFIBRILLATOR) IN PLACE: Primary | ICD-10-CM

## 2018-05-23 DIAGNOSIS — E11.40 TYPE 2 DIABETES MELLITUS WITH DIABETIC NEUROPATHY, WITHOUT LONG-TERM CURRENT USE OF INSULIN (HCC): ICD-10-CM

## 2018-05-23 DIAGNOSIS — I50.43 ACUTE ON CHRONIC COMBINED SYSTOLIC AND DIASTOLIC CONGESTIVE HEART FAILURE (HCC): ICD-10-CM

## 2018-05-23 PROCEDURE — 1036F TOBACCO NON-USER: CPT | Performed by: INTERNAL MEDICINE

## 2018-05-23 PROCEDURE — 1123F ACP DISCUSS/DSCN MKR DOCD: CPT | Performed by: INTERNAL MEDICINE

## 2018-05-23 PROCEDURE — 99214 OFFICE O/P EST MOD 30 MIN: CPT | Performed by: INTERNAL MEDICINE

## 2018-05-23 PROCEDURE — 1090F PRES/ABSN URINE INCON ASSESS: CPT | Performed by: INTERNAL MEDICINE

## 2018-05-23 PROCEDURE — G8399 PT W/DXA RESULTS DOCUMENT: HCPCS | Performed by: INTERNAL MEDICINE

## 2018-05-23 PROCEDURE — G8427 DOCREV CUR MEDS BY ELIG CLIN: HCPCS | Performed by: INTERNAL MEDICINE

## 2018-05-23 PROCEDURE — G8598 ASA/ANTIPLAT THER USED: HCPCS | Performed by: INTERNAL MEDICINE

## 2018-05-23 PROCEDURE — 4040F PNEUMOC VAC/ADMIN/RCVD: CPT | Performed by: INTERNAL MEDICINE

## 2018-05-23 PROCEDURE — G8417 CALC BMI ABV UP PARAM F/U: HCPCS | Performed by: INTERNAL MEDICINE

## 2018-05-23 ASSESSMENT — PATIENT HEALTH QUESTIONNAIRE - PHQ9
1. LITTLE INTEREST OR PLEASURE IN DOING THINGS: 0
SUM OF ALL RESPONSES TO PHQ QUESTIONS 1-9: 0
SUM OF ALL RESPONSES TO PHQ9 QUESTIONS 1 & 2: 0
2. FEELING DOWN, DEPRESSED OR HOPELESS: 0

## 2018-05-24 ASSESSMENT — ENCOUNTER SYMPTOMS
CONSTIPATION: 0
ALLERGIC/IMMUNOLOGIC NEGATIVE: 1
SHORTNESS OF BREATH: 1
EYES NEGATIVE: 1

## 2018-06-05 ENCOUNTER — CARE COORDINATION (OUTPATIENT)
Dept: CARE COORDINATION | Age: 78
End: 2018-06-05

## 2018-06-21 ENCOUNTER — CARE COORDINATION (OUTPATIENT)
Dept: CARE COORDINATION | Age: 78
End: 2018-06-21

## 2018-06-25 ENCOUNTER — HOSPITAL ENCOUNTER (OUTPATIENT)
Age: 78
Setting detail: SPECIMEN
Discharge: HOME | End: 2018-06-25
Payer: MEDICARE

## 2018-06-25 ENCOUNTER — TELEPHONE (OUTPATIENT)
Dept: INTERNAL MEDICINE CLINIC | Age: 78
End: 2018-06-25

## 2018-06-25 DIAGNOSIS — R35.0 FREQUENT URINATION: Primary | ICD-10-CM

## 2018-06-25 DIAGNOSIS — R35.0 FREQUENT URINATION: ICD-10-CM

## 2018-06-25 LAB
-: ABNORMAL
AMORPHOUS: ABNORMAL
BACTERIA: ABNORMAL
BILIRUBIN URINE: NEGATIVE
CASTS UA: ABNORMAL /LPF (ref 0–8)
COLOR: YELLOW
COMMENT UA: ABNORMAL
CRYSTALS, UA: ABNORMAL /HPF
EPITHELIAL CELLS UA: ABNORMAL /HPF (ref 0–5)
GLUCOSE URINE: NEGATIVE
KETONES, URINE: NEGATIVE
LEUKOCYTE ESTERASE, URINE: ABNORMAL
MUCUS: ABNORMAL
NITRITE, URINE: NEGATIVE
OTHER OBSERVATIONS UA: ABNORMAL
PH UA: 7 (ref 5–8)
PROTEIN UA: NEGATIVE
RBC UA: ABNORMAL /HPF (ref 0–4)
RENAL EPITHELIAL, UA: ABNORMAL /HPF
SPECIFIC GRAVITY UA: 1.01 (ref 1–1.03)
TRICHOMONAS: ABNORMAL
TURBIDITY: ABNORMAL
URINE HGB: ABNORMAL
UROBILINOGEN, URINE: NORMAL
WBC UA: ABNORMAL /HPF (ref 0–5)
YEAST: ABNORMAL

## 2018-06-26 LAB
CULTURE: NORMAL
Lab: NORMAL
SPECIMEN DESCRIPTION: NORMAL
STATUS: NORMAL

## 2018-06-27 ENCOUNTER — TELEPHONE (OUTPATIENT)
Dept: INTERNAL MEDICINE CLINIC | Age: 78
End: 2018-06-27

## 2018-07-03 ENCOUNTER — CARE COORDINATION (OUTPATIENT)
Dept: CARE COORDINATION | Age: 78
End: 2018-07-03

## 2018-07-03 NOTE — CARE COORDINATION
Attempting to reach patient for a follow up care coordination call regarding any needs, questions or concerns.   Lazara Deloris, [facility]

## 2018-07-19 ENCOUNTER — CARE COORDINATION (OUTPATIENT)
Dept: CARE COORDINATION | Age: 78
End: 2018-07-19

## 2018-08-22 ENCOUNTER — CARE COORDINATION (OUTPATIENT)
Dept: CARE COORDINATION | Age: 78
End: 2018-08-22

## 2018-09-05 ENCOUNTER — OFFICE VISIT (OUTPATIENT)
Dept: INTERNAL MEDICINE CLINIC | Age: 78
End: 2018-09-05
Payer: MEDICARE

## 2018-09-05 VITALS
HEIGHT: 62 IN | BODY MASS INDEX: 29.63 KG/M2 | DIASTOLIC BLOOD PRESSURE: 74 MMHG | WEIGHT: 161 LBS | SYSTOLIC BLOOD PRESSURE: 122 MMHG

## 2018-09-05 DIAGNOSIS — R55 SYNCOPE AND COLLAPSE: ICD-10-CM

## 2018-09-05 DIAGNOSIS — I48.20 CHRONIC ATRIAL FIBRILLATION (HCC): Primary | ICD-10-CM

## 2018-09-05 DIAGNOSIS — I50.42 CHRONIC COMBINED SYSTOLIC AND DIASTOLIC CONGESTIVE HEART FAILURE (HCC): ICD-10-CM

## 2018-09-05 PROCEDURE — 1090F PRES/ABSN URINE INCON ASSESS: CPT | Performed by: INTERNAL MEDICINE

## 2018-09-05 PROCEDURE — G8598 ASA/ANTIPLAT THER USED: HCPCS | Performed by: INTERNAL MEDICINE

## 2018-09-05 PROCEDURE — G8427 DOCREV CUR MEDS BY ELIG CLIN: HCPCS | Performed by: INTERNAL MEDICINE

## 2018-09-05 PROCEDURE — 99214 OFFICE O/P EST MOD 30 MIN: CPT | Performed by: INTERNAL MEDICINE

## 2018-09-05 PROCEDURE — G8417 CALC BMI ABV UP PARAM F/U: HCPCS | Performed by: INTERNAL MEDICINE

## 2018-09-05 PROCEDURE — 1123F ACP DISCUSS/DSCN MKR DOCD: CPT | Performed by: INTERNAL MEDICINE

## 2018-09-05 PROCEDURE — 4040F PNEUMOC VAC/ADMIN/RCVD: CPT | Performed by: INTERNAL MEDICINE

## 2018-09-05 PROCEDURE — 1101F PT FALLS ASSESS-DOCD LE1/YR: CPT | Performed by: INTERNAL MEDICINE

## 2018-09-05 PROCEDURE — 1036F TOBACCO NON-USER: CPT | Performed by: INTERNAL MEDICINE

## 2018-09-05 PROCEDURE — G8399 PT W/DXA RESULTS DOCUMENT: HCPCS | Performed by: INTERNAL MEDICINE

## 2018-09-05 RX ORDER — NITROGLYCERIN 0.4 MG/1
TABLET SUBLINGUAL
Refills: 3 | COMMUNITY
Start: 2018-06-01 | End: 2018-09-05

## 2018-09-05 RX ORDER — CEPHALEXIN 500 MG/1
CAPSULE ORAL
Refills: 0 | COMMUNITY
Start: 2018-08-26 | End: 2018-11-06 | Stop reason: ALTCHOICE

## 2018-09-05 RX ORDER — NITROGLYCERIN 0.4 MG/1
0.4 TABLET SUBLINGUAL
COMMUNITY
Start: 2018-06-01 | End: 2020-01-01 | Stop reason: SDUPTHER

## 2018-09-05 ASSESSMENT — PATIENT HEALTH QUESTIONNAIRE - PHQ9
SUM OF ALL RESPONSES TO PHQ QUESTIONS 1-9: 0
1. LITTLE INTEREST OR PLEASURE IN DOING THINGS: 0
2. FEELING DOWN, DEPRESSED OR HOPELESS: 0
SUM OF ALL RESPONSES TO PHQ9 QUESTIONS 1 & 2: 0
SUM OF ALL RESPONSES TO PHQ QUESTIONS 1-9: 0

## 2018-09-05 ASSESSMENT — ENCOUNTER SYMPTOMS
SHORTNESS OF BREATH: 1
EYES NEGATIVE: 1
ALLERGIC/IMMUNOLOGIC NEGATIVE: 1
CONSTIPATION: 0

## 2018-09-05 NOTE — PROGRESS NOTES
Subjective:      Patient ID: Anabelle Isabel is a 65 y.o. female. Chronic Disease Visit Information    BP Readings from Last 3 Encounters:   05/23/18 110/80   02/20/18 110/60   12/20/17 104/60          Hemoglobin A1C (%)   Date Value   02/20/2018 6.3 (H)   01/23/2017 6.0   08/23/2016 7.2 (H)     Microalb/Crt. Ratio (mcg/mg creat)   Date Value   04/17/2017 106 (H)     LDL Cholesterol (mg/dL)   Date Value   01/16/2014 154 (H)     HDL (mg/dL)   Date Value   01/16/2014 77     BUN (mg/dL)   Date Value   01/23/2017 18     CREATININE (mg/dL)   Date Value   01/23/2017 1.23 (H)     Glucose (mg/dL)   Date Value   01/23/2017 102 (H)   02/17/2012 122 (H)            Have you changed or started any medications since your last visit including any over-the-counter medicines, vitamins, or herbal medicines? no   Are you having any side effects from any of your medications? -  no  Have you stopped taking any of your medications? Is so, why? -  no    Have you seen any other physician or provider since your last visit? Yes - Records Obtained  Have you had any other diagnostic tests since your last visit? Yes - Records Obtained  Have you been seen in the emergency room and/or had an admission to a hospital since we last saw you? Yes - Records Obtained  Have you had your annual diabetic retinal (eye) exam? No  Have you had your routine dental cleaning in the past 6 months? no    Have you activated your [employer] account? If not, what are your barriers?  No:      Patient Care Team:  González Castellanos MD as PCP - Chayito Kennedy MD as PCP - [facility] Attributed Provider  Sharon Swann MD as Consulting Physician (Internal Medicine Cardiovascular Disease)  Shaina Calixto MD as Consulting Physician (Internal Medicine)  Margaret Regan RN as Care Coordinator           Chief Complaint   Patient presents with    Diabetes    Hypertension    Hypothyroidism         Medical History Review  Past Medical, Family, and Social History reviewed and does contribute to the patient presenting condition    Health Maintenance   Topic Date Due    Shingles Vaccine (1 of 2 - 2 Dose Series) 09/14/1989    TSH testing  01/23/2018    Potassium monitoring  01/23/2018    Creatinine monitoring  01/23/2018    Flu vaccine (1) 09/01/2018    DTaP/Tdap/Td vaccine (1 - Tdap) 11/07/2018 (Originally 9/14/1959)    DEXA (modify frequency per FRAX score)  Completed    Pneumococcal low/med risk  Completed       She had syncope and AICD  Shock was delivered to her . She was seen at [facility]        Review of Systems   Constitutional: Negative for fatigue. HENT: Negative for sneezing. Eyes: Negative. Respiratory: Positive for shortness of breath. Cardiovascular: Positive for palpitations. Negative for chest pain. Syncope    Gastrointestinal: Negative for constipation. Endocrine: Negative. Genitourinary: Negative. Allergic/Immunologic: Negative. Neurological: Negative. Objective:   Physical Exam   Constitutional: She is oriented to person, place, and time. She appears well-developed and well-nourished. She is active and cooperative. She does not have a sickly appearance. No distress. HENT:   Head: Normocephalic and atraumatic. Head is without abrasion, without contusion and without laceration. Right Ear: External ear normal.   Left Ear: External ear normal.   Nose: No mucosal edema, nose lacerations or septal deviation. Mouth/Throat: Mucous membranes are not pale. No oropharyngeal exudate or posterior oropharyngeal edema. Eyes: Pupils are equal, round, and reactive to light. EOM are normal. Right eye exhibits no discharge. Left eye exhibits no discharge. No scleral icterus. Neck: Neck supple. No hepatojugular reflux present. Carotid bruit is not present. No thyroid mass and no thyromegaly present. Cardiovascular: Normal rate and normal heart sounds. An irregularly irregular rhythm present. Exam reveals no S3.     No murmur heard.  Apical rate 84/ minute    Pulmonary/Chest: Effort normal and breath sounds normal. She has no wheezes. She has no rales. Abdominal: Soft. Musculoskeletal: Normal range of motion. She exhibits no edema or tenderness. Lymphadenopathy:     She has no cervical adenopathy. She has no axillary adenopathy. Right: No supraclavicular and no epitrochlear adenopathy present. Left: No supraclavicular and no epitrochlear adenopathy present. Neurological: She is alert and oriented to person, place, and time. She displays no atrophy and no tremor. No cranial nerve deficit. She exhibits normal muscle tone. She displays no seizure activity. Coordination and gait normal.   Skin: Skin is warm and dry. No bruising, no laceration, no petechiae and no rash noted. She is not diaphoretic. No cyanosis or erythema. No pallor. Nails show no clubbing. Bruise right calf   Psychiatric: She has a normal mood and affect. Her speech is normal and behavior is normal. Judgment and thought content normal. Cognition and memory are normal.   Vitals reviewed. Assessment / Plan:      Diagnosis Orders   1. Chronic atrial fibrillation ([address]     2. Chronic combined systolic and diastolic congestive heart failure ([address]  Well  Compensated    3. Syncope and collapse  She was seen by  [facility] cardiology and her device is working normal      No Follow-up on file. No orders of the defined types were placed in this encounter. No orders of the defined types were placed in this encounter.

## 2018-09-11 ENCOUNTER — CARE COORDINATION (OUTPATIENT)
Dept: CARE COORDINATION | Age: 78
End: 2018-09-11

## 2018-09-11 NOTE — CARE COORDINATION
Ambulatory Care Coordination Note  9/11/2018  CM Risk Score: 4  Brandon Mortality Risk Score: 23.1    ACC: Juan Wilhelm RN    Summary Note: Spoke with patient who denied any needs from PCP or CC at this time. Patient stated \"I'm doing pretty darn good\". CC asked patient if she would like to move up he next PCP appointment d/t her recent admission to [facility], she declined. CC encouraged patient to call with any needs before her next appointment. Patient stated she has all of her medications and no concerns. CC Plan:   Follow up in 3 weeks for any needs. Diabetes Assessment    Medic Alert ID:  No  Meal Planning:  Avoidance of concentrated sweets   How often do you test your blood sugar?:  Daily   Do you have barriers with adherence to non-pharmacologic self-management interventions? (Nutrition/Exercise/Self-Monitoring):  No   Have you ever had to go to the ED for symptoms of low blood sugar?:  No       No patient-reported symptoms       and   Congestive Heart Failure Assessment    Are you currently restricting fluids?:  1800cc  Do you understand a low sodium diet?:  Yes  Do you understand how to read food labels?:  Yes  How many restaurant meals do you eat per week?:  1-2  Do you salt your food before tasting it?:  No     No patient-reported symptoms      Symptoms:   None:  Yes      Symptom course:  stable  Weight trend:  stable  Salt intake watch compared to last visit:  stable             Care Coordination Interventions    Program Enrollment:  Rising Risk  Referral from Primary Care Provider:  No  Suggested Interventions and Community Resources         Goals Addressed             Most Recent     Self Monitoring   On track (9/11/2018)             Daily Weights - I will weight myself as directed - Daily and write down weights  I will notify my provider of any increase in weight by 3 or more pounds in 2 days OR 5 or more pounds in a week.     Patient Reported Weight   Patient Reported Weights 6/14/2017 Weight 160 lb       Barriers: time constraints  Plan for overcoming my barriers: will try to do every morning. Confidence: 7/10  Anticipated Goal Completion Date: 8/30/17              Prior to Admission medications    Medication Sig Start Date End Date Taking?  Authorizing Provider   cephALEXin (KEFLEX) 500 MG capsule TK ONE C PO BID FOR 10 DAYS 8/26/18   Historical Provider, MD   nitroGLYCERIN (NITROSTAT) 0.4 MG SL tablet Place 0.4 mg under the tongue 6/1/18   Historical Provider, MD   warfarin (COUMADIN) 3 MG tablet TAKE 1 TABLET EVERY OTHER DAY AS DIRECTED 1/29/18   Kaylyn Escoto MD   sotalol (BETAPACE) 80 MG tablet TAKE ONE-HALF (1/2) TABLET TWICE A DAY  Patient taking differently: TAKE ONE TABLET TWICE A DAY 12/29/17   Billie Musa MD   furosemide (LASIX) 40 MG tablet TAKE 1 TABLET TWICE A DAY 12/20/17   Kaylyn Escoto MD   glimepiride (AMARYL) 2 MG tablet Take 1 tablet by mouth every morning (before breakfast) 12/19/17   Kaylyn Escoto MD   warfarin (COUMADIN) 1 MG tablet Take 2 tablets by mouth every other day 12/11/17   Kaylyn Escoto MD   levothyroxine (SYNTHROID) 50 MCG tablet TAKE 1 TABLET EVERY MORNING 10/31/17   Kaylyn Escoto MD   metoprolol succinate (TOPROL XL) 25 MG extended release tablet Take 1 tablet by mouth daily 10/24/17   Kaylyn Escoto MD   spironolactone (ALDACTONE) 25 MG tablet TAKE 1 TABLET EVERY MORNING 9/11/17   Kaylyn Escoto MD   docusate sodium (COLACE) 100 MG capsule Take 100 mg by mouth every morning     Historical Provider, MD   carvedilol (COREG) 3.125 MG tablet Take 1 tablet by mouth 2 times daily 6/2/16   Kaylyn Escoto MD   aspirin 81 MG tablet Take 81 mg by mouth every morning     Historical Provider, MD       Future Appointments  Date Time Provider Russ Cardona   11/28/2018 12:15 PM Kaylyn Escoto MD [address]

## 2018-10-01 RX ORDER — METOPROLOL SUCCINATE 25 MG/1
TABLET, EXTENDED RELEASE ORAL
Qty: 90 TABLET | Refills: 3 | Status: SHIPPED | OUTPATIENT
Start: 2018-10-01 | End: 2019-01-01 | Stop reason: SDUPTHER

## 2018-10-02 ENCOUNTER — CARE COORDINATION (OUTPATIENT)
Dept: CARE COORDINATION | Age: 78
End: 2018-10-02

## 2018-10-15 ENCOUNTER — CARE COORDINATION (OUTPATIENT)
Dept: CARE COORDINATION | Age: 78
End: 2018-10-15

## 2018-10-23 RX ORDER — WARFARIN SODIUM 1 MG/1
TABLET ORAL
Qty: 90 TABLET | Refills: 3 | Status: ON HOLD | OUTPATIENT
Start: 2018-10-23 | End: 2020-01-01 | Stop reason: DRUGHIGH

## 2018-10-29 ENCOUNTER — TELEPHONE (OUTPATIENT)
Dept: ONCOLOGY | Age: 78
End: 2018-10-29

## 2018-10-30 NOTE — TELEPHONE ENCOUNTER
Lost to oncology. Requested Kelsi Armstrong MA to contact patient to see if she is following with Dr. Drew Holder, or if she would like to follow up with [address] [place] [address]. Signed off navigation at this time.

## 2018-11-06 ENCOUNTER — OFFICE VISIT (OUTPATIENT)
Dept: INTERNAL MEDICINE CLINIC | Age: 78
End: 2018-11-06
Payer: MEDICARE

## 2018-11-06 ENCOUNTER — CARE COORDINATION (OUTPATIENT)
Dept: CARE COORDINATION | Age: 78
End: 2018-11-06

## 2018-11-06 VITALS
SYSTOLIC BLOOD PRESSURE: 114 MMHG | WEIGHT: 160.94 LBS | HEIGHT: 62 IN | DIASTOLIC BLOOD PRESSURE: 62 MMHG | BODY MASS INDEX: 29.62 KG/M2

## 2018-11-06 DIAGNOSIS — I50.23 ACUTE ON CHRONIC SYSTOLIC HEART FAILURE (HCC): ICD-10-CM

## 2018-11-06 DIAGNOSIS — N18.30 CKD (CHRONIC KIDNEY DISEASE) STAGE 3, GFR 30-59 ML/MIN (HCC): ICD-10-CM

## 2018-11-06 DIAGNOSIS — I48.20 CHRONIC ATRIAL FIBRILLATION (HCC): ICD-10-CM

## 2018-11-06 DIAGNOSIS — I50.40 COMBINED SYSTOLIC AND DIASTOLIC CONGESTIVE HEART FAILURE, UNSPECIFIED HF CHRONICITY (HCC): Primary | ICD-10-CM

## 2018-11-06 PROCEDURE — 1111F DSCHRG MED/CURRENT MED MERGE: CPT | Performed by: INTERNAL MEDICINE

## 2018-11-06 PROCEDURE — 99215 OFFICE O/P EST HI 40 MIN: CPT | Performed by: INTERNAL MEDICINE

## 2018-11-06 RX ORDER — FUROSEMIDE 40 MG/1
40 TABLET ORAL 2 TIMES DAILY
Qty: 180 TABLET | Refills: 3 | Status: SHIPPED | OUTPATIENT
Start: 2018-11-06 | End: 2018-11-21 | Stop reason: SDUPTHER

## 2018-11-06 ASSESSMENT — ENCOUNTER SYMPTOMS
SHORTNESS OF BREATH: 1
ALLERGIC/IMMUNOLOGIC NEGATIVE: 1
EYES NEGATIVE: 1
CONSTIPATION: 0

## 2018-11-06 ASSESSMENT — PATIENT HEALTH QUESTIONNAIRE - PHQ9
2. FEELING DOWN, DEPRESSED OR HOPELESS: 0
SUM OF ALL RESPONSES TO PHQ QUESTIONS 1-9: 0
1. LITTLE INTEREST OR PLEASURE IN DOING THINGS: 0
SUM OF ALL RESPONSES TO PHQ9 QUESTIONS 1 & 2: 0
SUM OF ALL RESPONSES TO PHQ QUESTIONS 1-9: 0

## 2018-11-06 NOTE — PROGRESS NOTES
Subjective:      Patient ID: Shirley Santana is a 77 y.o. female. Chronic Disease Visit Information    BP Readings from Last 3 Encounters:   09/05/18 122/74   05/23/18 110/80   02/20/18 110/60          Hemoglobin A1C (%)   Date Value   02/20/2018 6.3 (H)   01/23/2017 6.0   08/23/2016 7.2 (H)     Microalb/Crt. Ratio (mcg/mg creat)   Date Value   04/17/2017 106 (H)     LDL Cholesterol (mg/dL)   Date Value   01/16/2014 154 (H)     HDL (mg/dL)   Date Value   01/16/2014 77     BUN (mg/dL)   Date Value   01/23/2017 18     CREATININE (mg/dL)   Date Value   01/23/2017 1.23 (H)     Glucose (mg/dL)   Date Value   01/23/2017 102 (H)   02/17/2012 122 (H)            Have you changed or started any medications since your last visit including any over-the-counter medicines, vitamins, or herbal medicines? no   Are you having any side effects from any of your medications? -  no  Have you stopped taking any of your medications? Is so, why? -  no    Have you seen any other physician or provider since your last visit? Yes - Records Obtained  Have you had any other diagnostic tests since your last visit? Yes - Records Obtained  Have you been seen in the emergency room and/or had an admission to a hospital since we last saw you? Yes - Records Obtained  Have you had your annual diabetic retinal (eye) exam? No  Have you had your routine dental cleaning in the past 6 months? yes -     Have you activated your [employer] account? If not, what are your barriers? No:      Patient Care Team:  Kevin Gresham MD as PCP - Carol Rojas MD as PCP - S Attributed Provider  Otilia Fry MD as Consulting Physician (Internal Medicine Cardiovascular Disease)  Skyler Holliday MD as Consulting Physician (Internal Medicine)  Neli Bustamante RN as Care Coordinator     Chief Complaint   Patient presents with    Follow-Up from [facility] x 1 night - CHF. pt states she is feeling better.                Medical History Review  Past

## 2018-11-06 NOTE — PROGRESS NOTES
3 Post-Discharge Transitional Care Management Services or Hospital Follow Up      Jacob Fields   YOB: 1940    Date of Office Visit:  11/6/2018  Date of Hospital Admission: 8/11/16  Date of Hospital Discharge: 8/11/16  Readmission Risk Score(high >=14%.  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)    Female cystocele 2-3    Pessary maintenance    Breast cancer, left ([address]    Personal history of breast cancer    GERD (gastroesophageal reflux disease)    Systolic CHF ([address]    CKD (chronic kidney disease) stage 3, GFR 30-59 ml/min ([address]    Idiopathic cardiomyopathy ([address]    HLD (hyperlipidemia)    CAD (coronary artery disease)    Hypertension    Type 2 diabetes mellitus with neurologic complication ([address]    Combined systolic and diastolic congestive heart failure ([address]    CHF exacerbation ([address]    Supratherapeutic INR    Congestive heart failure ([address]    Hypertensive heart disease    Acute on chronic combined systolic and diastolic congestive heart failure ([address]    Pacemaker    ICD (implantable cardioverter-defibrillator) in place    Acquired hypothyroidism    Chronic atrial fibrillation (HCC)    Low back pain    Syncope and collapse       Allergies   Allergen Reactions    Ciprofloxacin Itching    Penicillins Diarrhea    Statins     Sulfa Antibiotics        Medications listed as ordered at the time of discharge from hospital   Robbie Mccarty laceration, no petechiae and no rash noted. She is not diaphoretic. No cyanosis or erythema. No pallor. Nails show no clubbing. Bruise right calf   Psychiatric: She has a normal mood and affect. Her speech is normal and behavior is normal. Judgment and thought content normal. Cognition and memory are normal.   Vitals reviewed. Assessment/Plan:      Diagnosis Orders   1. Combined systolic and diastolic congestive heart failure, unspecified HF chronicity ([facility]     2. Chronic atrial fibrillation ([facility])     3. CKD (chronic kidney disease) stage 3, GFR 30-59 ml/min ([facility])     4.  Acute on chronic systolic heart failure ([facility]  OR DISCHARGE MEDS RECONCILED W/ CURRENT OUTPATIENT MED LIST           Coy medications were reviewed and she is double dose of lasix

## 2018-11-08 RX ORDER — GLIMEPIRIDE 2 MG/1
TABLET ORAL
Qty: 90 TABLET | Refills: 3 | Status: SHIPPED | OUTPATIENT
Start: 2018-11-08 | End: 2019-01-01 | Stop reason: SDUPTHER

## 2018-11-20 ENCOUNTER — CARE COORDINATION (OUTPATIENT)
Dept: CARE COORDINATION | Age: 78
End: 2018-11-20

## 2018-11-20 NOTE — CARE COORDINATION
Attempting to reach patient for a follow up care coordination call regarding any needs, questions or concerns.   Zelalem Mcclendon, [address]  Will follow up with patient a upcoming PCP appointment

## 2018-11-26 RX ORDER — FUROSEMIDE 40 MG/1
TABLET ORAL
Qty: 180 TABLET | Refills: 3 | Status: ON HOLD | OUTPATIENT
Start: 2018-11-26 | End: 2020-01-01 | Stop reason: HOSPADM

## 2018-11-28 ENCOUNTER — OFFICE VISIT (OUTPATIENT)
Dept: INTERNAL MEDICINE CLINIC | Age: 78
End: 2018-11-28
Payer: MEDICARE

## 2018-11-28 VITALS
BODY MASS INDEX: 29.44 KG/M2 | WEIGHT: 160 LBS | DIASTOLIC BLOOD PRESSURE: 72 MMHG | HEIGHT: 62 IN | SYSTOLIC BLOOD PRESSURE: 118 MMHG

## 2018-11-28 DIAGNOSIS — I42.9 IDIOPATHIC CARDIOMYOPATHY (HCC): Primary | ICD-10-CM

## 2018-11-28 DIAGNOSIS — E11.40 TYPE 2 DIABETES MELLITUS WITH DIABETIC NEUROPATHY, WITHOUT LONG-TERM CURRENT USE OF INSULIN (HCC): ICD-10-CM

## 2018-11-28 DIAGNOSIS — I50.40 COMBINED SYSTOLIC AND DIASTOLIC CONGESTIVE HEART FAILURE, UNSPECIFIED HF CHRONICITY (HCC): ICD-10-CM

## 2018-11-28 PROCEDURE — 1101F PT FALLS ASSESS-DOCD LE1/YR: CPT | Performed by: INTERNAL MEDICINE

## 2018-11-28 PROCEDURE — 1036F TOBACCO NON-USER: CPT | Performed by: INTERNAL MEDICINE

## 2018-11-28 PROCEDURE — 4040F PNEUMOC VAC/ADMIN/RCVD: CPT | Performed by: INTERNAL MEDICINE

## 2018-11-28 PROCEDURE — G8417 CALC BMI ABV UP PARAM F/U: HCPCS | Performed by: INTERNAL MEDICINE

## 2018-11-28 PROCEDURE — G8399 PT W/DXA RESULTS DOCUMENT: HCPCS | Performed by: INTERNAL MEDICINE

## 2018-11-28 PROCEDURE — G8482 FLU IMMUNIZE ORDER/ADMIN: HCPCS | Performed by: INTERNAL MEDICINE

## 2018-11-28 PROCEDURE — G8598 ASA/ANTIPLAT THER USED: HCPCS | Performed by: INTERNAL MEDICINE

## 2018-11-28 PROCEDURE — G8427 DOCREV CUR MEDS BY ELIG CLIN: HCPCS | Performed by: INTERNAL MEDICINE

## 2018-11-28 PROCEDURE — 99214 OFFICE O/P EST MOD 30 MIN: CPT | Performed by: INTERNAL MEDICINE

## 2018-11-28 PROCEDURE — 1090F PRES/ABSN URINE INCON ASSESS: CPT | Performed by: INTERNAL MEDICINE

## 2018-11-28 PROCEDURE — 1123F ACP DISCUSS/DSCN MKR DOCD: CPT | Performed by: INTERNAL MEDICINE

## 2018-11-28 ASSESSMENT — ENCOUNTER SYMPTOMS
SHORTNESS OF BREATH: 1
EYES NEGATIVE: 1
CONSTIPATION: 0
ALLERGIC/IMMUNOLOGIC NEGATIVE: 1

## 2018-11-28 ASSESSMENT — PATIENT HEALTH QUESTIONNAIRE - PHQ9
2. FEELING DOWN, DEPRESSED OR HOPELESS: 0
SUM OF ALL RESPONSES TO PHQ9 QUESTIONS 1 & 2: 0
SUM OF ALL RESPONSES TO PHQ QUESTIONS 1-9: 0
1. LITTLE INTEREST OR PLEASURE IN DOING THINGS: 0
SUM OF ALL RESPONSES TO PHQ QUESTIONS 1-9: 0

## 2018-11-28 NOTE — PROGRESS NOTES
Subjective:      Patient ID: Ailyn Mahmood is a 77 y.o. female. Chronic Disease Visit Information    BP Readings from Last 3 Encounters:   11/06/18 114/62   09/05/18 122/74   05/23/18 110/80          Hemoglobin A1C (%)   Date Value   02/20/2018 6.3 (H)   01/23/2017 6.0   08/23/2016 7.2 (H)     Microalb/Crt. Ratio (mcg/mg creat)   Date Value   04/17/2017 106 (H)     LDL Cholesterol (mg/dL)   Date Value   01/16/2014 154 (H)     HDL (mg/dL)   Date Value   01/16/2014 77     BUN (mg/dL)   Date Value   01/23/2017 18     CREATININE (mg/dL)   Date Value   01/23/2017 1.23 (H)     Glucose (mg/dL)   Date Value   01/23/2017 102 (H)   02/17/2012 122 (H)            Have you changed or started any medications since your last visit including any over-the-counter medicines, vitamins, or herbal medicines? no   Are you having any side effects from any of your medications? -  no  Have you stopped taking any of your medications? Is so, why? -  no    Have you seen any other physician or provider since your last visit? No  Have you had any other diagnostic tests since your last visit? No  Have you been seen in the emergency room and/or had an admission to a hospital since we last saw you? No  Have you had your annual diabetic retinal (eye) exam? No  Have you had your routine dental cleaning in the past 6 months? yes -     Have you activated your [employer] account? If not, what are your barriers?  No: pending     Patient Care Team:  Esteban Moreland MD as PCP - Paris Gee MD as PCP - [facility] Attributed Provider  Denita Singh MD as Consulting Physician (Internal Medicine Cardiovascular Disease)  Hawa Chawla MD as Consulting Physician (Internal Medicine)  Traci Hastings RN as Care Coordinator     Chief Complaint   Patient presents with    Hypertension    Congestive Heart Failure    Chronic Kidney Disease              Medical History Review  Past Medical, Family, and Social History reviewed and does contribute to the

## 2018-12-03 ENCOUNTER — CARE COORDINATION (OUTPATIENT)
Dept: CARE COORDINATION | Age: 78
End: 2018-12-03

## 2018-12-03 NOTE — CARE COORDINATION
Attempting to reach patient for a follow up care coordination call regarding any needs, questions or concerns.   Catrina Melendez, [address]

## 2019-01-01 ENCOUNTER — CARE COORDINATION (OUTPATIENT)
Dept: CARE COORDINATION | Age: 79
End: 2019-01-01

## 2019-01-01 ENCOUNTER — OFFICE VISIT (OUTPATIENT)
Dept: INTERNAL MEDICINE CLINIC | Age: 79
End: 2019-01-01
Payer: MEDICARE

## 2019-01-01 VITALS
BODY MASS INDEX: 30.18 KG/M2 | HEIGHT: 62 IN | WEIGHT: 164 LBS | DIASTOLIC BLOOD PRESSURE: 62 MMHG | SYSTOLIC BLOOD PRESSURE: 118 MMHG

## 2019-01-01 DIAGNOSIS — E78.5 HYPERLIPIDEMIA, UNSPECIFIED HYPERLIPIDEMIA TYPE: ICD-10-CM

## 2019-01-01 DIAGNOSIS — I25.10 CORONARY ARTERY DISEASE INVOLVING NATIVE CORONARY ARTERY OF NATIVE HEART WITHOUT ANGINA PECTORIS: ICD-10-CM

## 2019-01-01 DIAGNOSIS — Z23 NEED FOR VACCINATION: Primary | ICD-10-CM

## 2019-01-01 DIAGNOSIS — I42.9 IDIOPATHIC CARDIOMYOPATHY (HCC): ICD-10-CM

## 2019-01-01 DIAGNOSIS — I50.20 SYSTOLIC CONGESTIVE HEART FAILURE, UNSPECIFIED HF CHRONICITY (HCC): ICD-10-CM

## 2019-01-01 DIAGNOSIS — I50.40 COMBINED SYSTOLIC AND DIASTOLIC CONGESTIVE HEART FAILURE, UNSPECIFIED HF CHRONICITY (HCC): ICD-10-CM

## 2019-01-01 DIAGNOSIS — Z95.810 ICD (IMPLANTABLE CARDIOVERTER-DEFIBRILLATOR) IN PLACE: ICD-10-CM

## 2019-01-01 DIAGNOSIS — E11.40 TYPE 2 DIABETES MELLITUS WITH DIABETIC NEUROPATHY, WITHOUT LONG-TERM CURRENT USE OF INSULIN (HCC): ICD-10-CM

## 2019-01-01 PROCEDURE — 4040F PNEUMOC VAC/ADMIN/RCVD: CPT | Performed by: INTERNAL MEDICINE

## 2019-01-01 PROCEDURE — 1123F ACP DISCUSS/DSCN MKR DOCD: CPT | Performed by: INTERNAL MEDICINE

## 2019-01-01 PROCEDURE — G8417 CALC BMI ABV UP PARAM F/U: HCPCS | Performed by: INTERNAL MEDICINE

## 2019-01-01 PROCEDURE — 1036F TOBACCO NON-USER: CPT | Performed by: INTERNAL MEDICINE

## 2019-01-01 PROCEDURE — G8427 DOCREV CUR MEDS BY ELIG CLIN: HCPCS | Performed by: INTERNAL MEDICINE

## 2019-01-01 PROCEDURE — 90653 IIV ADJUVANT VACCINE IM: CPT | Performed by: INTERNAL MEDICINE

## 2019-01-01 PROCEDURE — 1090F PRES/ABSN URINE INCON ASSESS: CPT | Performed by: INTERNAL MEDICINE

## 2019-01-01 PROCEDURE — G0008 ADMIN INFLUENZA VIRUS VAC: HCPCS | Performed by: INTERNAL MEDICINE

## 2019-01-01 PROCEDURE — G8399 PT W/DXA RESULTS DOCUMENT: HCPCS | Performed by: INTERNAL MEDICINE

## 2019-01-01 PROCEDURE — 99214 OFFICE O/P EST MOD 30 MIN: CPT | Performed by: INTERNAL MEDICINE

## 2019-01-01 PROCEDURE — G8598 ASA/ANTIPLAT THER USED: HCPCS | Performed by: INTERNAL MEDICINE

## 2019-01-01 PROCEDURE — G8482 FLU IMMUNIZE ORDER/ADMIN: HCPCS | Performed by: INTERNAL MEDICINE

## 2019-01-01 RX ORDER — GLIMEPIRIDE 2 MG/1
TABLET ORAL
Qty: 90 TABLET | Refills: 4 | Status: SHIPPED | OUTPATIENT
Start: 2019-01-01 | End: 2020-01-01 | Stop reason: SINTOL

## 2019-01-01 RX ORDER — METOPROLOL SUCCINATE 25 MG/1
TABLET, EXTENDED RELEASE ORAL
Qty: 90 TABLET | Refills: 4 | Status: ON HOLD | OUTPATIENT
Start: 2019-01-01 | End: 2020-01-01 | Stop reason: HOSPADM

## 2019-01-01 RX ORDER — COLCHICINE 0.6 MG/1
0.6 TABLET, FILM COATED ORAL DAILY
Qty: 15 TABLET | Refills: 0 | Status: SHIPPED | OUTPATIENT
Start: 2019-01-01 | End: 2020-01-01 | Stop reason: ALTCHOICE

## 2019-01-01 SDOH — ECONOMIC STABILITY: TRANSPORTATION INSECURITY
IN THE PAST 12 MONTHS, HAS THE LACK OF TRANSPORTATION KEPT YOU FROM MEDICAL APPOINTMENTS OR FROM GETTING MEDICATIONS?: NO

## 2019-01-01 SDOH — ECONOMIC STABILITY: FOOD INSECURITY: WITHIN THE PAST 12 MONTHS, YOU WORRIED THAT YOUR FOOD WOULD RUN OUT BEFORE YOU GOT MONEY TO BUY MORE.: NEVER TRUE

## 2019-01-01 SDOH — ECONOMIC STABILITY: TRANSPORTATION INSECURITY
IN THE PAST 12 MONTHS, HAS LACK OF TRANSPORTATION KEPT YOU FROM MEETINGS, WORK, OR FROM GETTING THINGS NEEDED FOR DAILY LIVING?: NO

## 2019-01-01 SDOH — ECONOMIC STABILITY: INCOME INSECURITY: HOW HARD IS IT FOR YOU TO PAY FOR THE VERY BASICS LIKE FOOD, HOUSING, MEDICAL CARE, AND HEATING?: NOT HARD AT ALL

## 2019-01-01 SDOH — HEALTH STABILITY: MENTAL HEALTH: HOW OFTEN DO YOU HAVE A DRINK CONTAINING ALCOHOL?: NEVER

## 2019-01-01 SDOH — ECONOMIC STABILITY: FOOD INSECURITY: WITHIN THE PAST 12 MONTHS, THE FOOD YOU BOUGHT JUST DIDN'T LAST AND YOU DIDN'T HAVE MONEY TO GET MORE.: NEVER TRUE

## 2019-01-01 ASSESSMENT — ENCOUNTER SYMPTOMS
EYES NEGATIVE: 1
SHORTNESS OF BREATH: 1
ALLERGIC/IMMUNOLOGIC NEGATIVE: 1
CONSTIPATION: 0

## 2019-01-03 ENCOUNTER — CARE COORDINATION (OUTPATIENT)
Dept: CARE COORDINATION | Age: 79
End: 2019-01-03

## 2019-02-07 ENCOUNTER — CARE COORDINATION (OUTPATIENT)
Dept: CARE COORDINATION | Age: 79
End: 2019-02-07

## 2019-03-14 ENCOUNTER — CARE COORDINATION (OUTPATIENT)
Dept: CARE COORDINATION | Age: 79
End: 2019-03-14

## 2019-04-02 ENCOUNTER — CARE COORDINATION (OUTPATIENT)
Dept: INTERNAL MEDICINE CLINIC | Age: 79
End: 2019-04-02

## 2019-04-02 ENCOUNTER — OFFICE VISIT (OUTPATIENT)
Dept: INTERNAL MEDICINE CLINIC | Age: 79
End: 2019-04-02
Payer: MEDICARE

## 2019-04-02 VITALS
DIASTOLIC BLOOD PRESSURE: 78 MMHG | WEIGHT: 157 LBS | HEIGHT: 62 IN | BODY MASS INDEX: 28.89 KG/M2 | SYSTOLIC BLOOD PRESSURE: 124 MMHG

## 2019-04-02 DIAGNOSIS — N18.30 CHRONIC KIDNEY DISEASE, STAGE III (MODERATE) (HCC): ICD-10-CM

## 2019-04-02 DIAGNOSIS — I42.9 IDIOPATHIC CARDIOMYOPATHY (HCC): ICD-10-CM

## 2019-04-02 DIAGNOSIS — E11.40 TYPE 2 DIABETES MELLITUS WITH DIABETIC NEUROPATHY, WITHOUT LONG-TERM CURRENT USE OF INSULIN (HCC): ICD-10-CM

## 2019-04-02 DIAGNOSIS — I48.20 CHRONIC ATRIAL FIBRILLATION (HCC): ICD-10-CM

## 2019-04-02 DIAGNOSIS — I50.20 SYSTOLIC CONGESTIVE HEART FAILURE, UNSPECIFIED HF CHRONICITY (HCC): ICD-10-CM

## 2019-04-02 DIAGNOSIS — I25.10 CORONARY ARTERY DISEASE INVOLVING NATIVE CORONARY ARTERY OF NATIVE HEART WITHOUT ANGINA PECTORIS: Primary | ICD-10-CM

## 2019-04-02 DIAGNOSIS — I50.22 CHRONIC SYSTOLIC CONGESTIVE HEART FAILURE (HCC): ICD-10-CM

## 2019-04-02 DIAGNOSIS — I50.40 COMBINED SYSTOLIC AND DIASTOLIC CONGESTIVE HEART FAILURE, UNSPECIFIED HF CHRONICITY (HCC): ICD-10-CM

## 2019-04-02 LAB
AVERAGE GLUCOSE: NORMAL
HBA1C MFR BLD: 7.1 %

## 2019-04-02 PROCEDURE — G8399 PT W/DXA RESULTS DOCUMENT: HCPCS | Performed by: INTERNAL MEDICINE

## 2019-04-02 PROCEDURE — 1036F TOBACCO NON-USER: CPT | Performed by: INTERNAL MEDICINE

## 2019-04-02 PROCEDURE — 99214 OFFICE O/P EST MOD 30 MIN: CPT | Performed by: INTERNAL MEDICINE

## 2019-04-02 PROCEDURE — 1123F ACP DISCUSS/DSCN MKR DOCD: CPT | Performed by: INTERNAL MEDICINE

## 2019-04-02 PROCEDURE — G8427 DOCREV CUR MEDS BY ELIG CLIN: HCPCS | Performed by: INTERNAL MEDICINE

## 2019-04-02 PROCEDURE — 1090F PRES/ABSN URINE INCON ASSESS: CPT | Performed by: INTERNAL MEDICINE

## 2019-04-02 PROCEDURE — G8417 CALC BMI ABV UP PARAM F/U: HCPCS | Performed by: INTERNAL MEDICINE

## 2019-04-02 PROCEDURE — 4040F PNEUMOC VAC/ADMIN/RCVD: CPT | Performed by: INTERNAL MEDICINE

## 2019-04-02 PROCEDURE — G8598 ASA/ANTIPLAT THER USED: HCPCS | Performed by: INTERNAL MEDICINE

## 2019-04-02 ASSESSMENT — ENCOUNTER SYMPTOMS
ALLERGIC/IMMUNOLOGIC NEGATIVE: 1
SHORTNESS OF BREATH: 1
EYES NEGATIVE: 1
CONSTIPATION: 0

## 2019-04-02 ASSESSMENT — PATIENT HEALTH QUESTIONNAIRE - PHQ9
SUM OF ALL RESPONSES TO PHQ9 QUESTIONS 1 & 2: 0
SUM OF ALL RESPONSES TO PHQ QUESTIONS 1-9: 0
2. FEELING DOWN, DEPRESSED OR HOPELESS: 0
1. LITTLE INTEREST OR PLEASURE IN DOING THINGS: 0
SUM OF ALL RESPONSES TO PHQ QUESTIONS 1-9: 0

## 2019-04-02 NOTE — PROGRESS NOTES
Subjective:      Patient ID: Deanna Olivier is a 77 y.o. female. Chronic Disease Visit Information    BP Readings from Last 3 Encounters:   11/28/18 118/72   11/06/18 114/62   09/05/18 122/74          Hemoglobin A1C (%)   Date Value   02/20/2018 6.3 (H)   01/23/2017 6.0   08/23/2016 7.2 (H)     Microalb/Crt. Ratio (mcg/mg creat)   Date Value   04/17/2017 106 (H)     LDL Cholesterol (mg/dL)   Date Value   01/16/2014 154 (H)     HDL (mg/dL)   Date Value   01/16/2014 77     BUN (mg/dL)   Date Value   01/23/2017 18     CREATININE (mg/dL)   Date Value   01/23/2017 1.23 (H)     Glucose (mg/dL)   Date Value   01/23/2017 102 (H)   02/17/2012 122 (H)            Have you changed or started any medications since your last visit including any over-the-counter medicines, vitamins, or herbal medicines? no   Are you having any side effects from any of your medications? -  no  Have you stopped taking any of your medications? Is so, why? -  no    Have you seen any other physician or provider since your last visit? No  Have you had any other diagnostic tests since your last visit? No  Have you been seen in the emergency room and/or had an admission to a hospital since we last saw you? No  Have you had your annual diabetic retinal (eye) exam? No  Have you had your routine dental cleaning in the past 6 months? no    Have you activated your [employer] account? If not, what are your barriers?  No:      Patient Care Team:  Abilio Cadena MD as PCP - Juan David Aguilar MD as PCP - [facility] Attributed Provider  Florencio Baum MD as Consulting Physician (Internal Medicine Cardiovascular Disease)  Jaspal Chu MD as Consulting Physician (Internal Medicine)  Adwoa Gonzalez RN as Care Coordinator     Chief Complaint   Patient presents with    Atrial Fibrillation    Congestive Heart Failure    Other     risk assessment 7              Medical History Review  Past Medical, Family, and Social History reviewed and does contribute to the patient presenting condition    Health Maintenance   Topic Date Due    TSH testing  01/23/2018    Potassium monitoring  01/23/2018    Creatinine monitoring  01/23/2018    Shingles Vaccine (1 of 2) 11/07/2019 (Originally 9/14/1989)    DTaP/Tdap/Td vaccine (1 - Tdap) 12/12/2019 (Originally 9/14/1959)    DEXA (modify frequency per FRAX score)  Completed    Flu vaccine  Completed    Pneumococcal 65+ years Vaccine  Completed       Congestive Heart Failure   Presents for follow-up (she has no symptoms today) visit. Associated symptoms include shortness of breath. Pertinent negatives include no chest pain, edema, fatigue, muscle weakness, near-syncope, nocturia, orthopnea or palpitations. The symptoms have been improving. Compliance with total regimen is 0-25%. Compliance problems include adherence to diet. Compliance with diet is %. Compliance with exercise is %. Compliance with medications is %. Side effects of treatment include headaches, limb pain, rash, urinary, visual, GI discomfort and auditory. Review of Systems   Constitutional: Negative for fatigue. HENT: Negative for sneezing. Eyes: Negative. Respiratory: Positive for shortness of breath. Cardiovascular: Negative for chest pain, palpitations and near-syncope. Syncope    Gastrointestinal: Negative for constipation. Endocrine: Negative. Genitourinary: Negative. Negative for nocturia. Musculoskeletal: Negative for muscle weakness. Allergic/Immunologic: Negative. Neurological: Negative. Objective:   Physical Exam   Constitutional: She is oriented to person, place, and time. She appears well-developed and well-nourished. She is active and cooperative. She does not have a sickly appearance. No distress. HENT:   Head: Normocephalic and atraumatic. Head is without abrasion, without contusion and without laceration.    Right Ear: External ear normal.   Left Ear: External ear normal.   Nose: No mellitus with diabetic neuropathy, without long-term current use of insulin (HCC)  -     Hemoglobin A1C; Future    Chronic systolic congestive heart failure (HCC)    Systolic congestive heart failure, unspecified HF chronicity (HCC)    Idiopathic cardiomyopathy (HCC)    Chronic atrial fibrillation (HCC)    Chronic kidney disease, stage III (moderate) (HCC)    Coronary artery disease involving native coronary artery of native heart without angina pectoris    Combined systolic and diastolic congestive heart failure, unspecified HF chronicity ([facility]          Return in about 3 months (around 6/25/2019) for Follow Up, diabetes mellitus, heart failure. Orders Placed This Encounter   Procedures    Hemoglobin A1C     Standing Status:   Future     Standing Expiration Date:   4/2/2020     No orders of the defined types were placed in this encounter.   she is stable and has no complaints

## 2019-04-03 NOTE — CARE COORDINATION
Ambulatory Care Coordination Note  4/3/2019  CM Risk Score: 11  Br[place] Mortality Risk Score: 38    ACC: Haylie Costa RN    Summary Note: Met with patient who denied any needs from CC. Patient stated she does not feel that she needs follow up calls at this time, she is doing fine. PCP noted in his visit that she is stable with no complaints. CC will route to PCP for graduation despite CM risk of 11 but will be happy to assist in the future if there is a need. Care Coordination Interventions    Program Enrollment:  Rising Risk  Referral from Primary Care Provider:  No  Suggested Interventions and Community Resources  Disease Specific Clinic:  Completed (Comment: chf clinic)  Zone Management Tools:  Completed         Goals Addressed                 This Visit's Progress     Medication Management   On track     I will take my medication as directed. I will notify my provider of any problems with medications, like adverse effects or side effects. I will notify my provider/Care Coordinator if I am unable to afford my medications. I will notify my provider for advice before I stop taking any of my medication. Barriers: lack of education  Plan for overcoming my barriers: work with CC  Confidence: 6/10  Anticipated Goal Completion Date: 5.5.19              Prior to Admission medications    Medication Sig Start Date End Date Taking?  Authorizing Provider   furosemide (LASIX) 40 MG tablet TAKE 1 TABLET TWICE A DAY 11/26/18   Shahzad Lobo MD   glimepiride (AMARYL) 2 MG tablet TAKE 1 TABLET EVERY MORNING BEFORE BREAKFAST 11/8/18   Shahzad Lobo MD   warfarin (COUMADIN) 1 MG tablet TAKE 2 TABLETS EVERY OTHER DAY 10/23/18   Shahzad Lobo MD   metoprolol succinate (TOPROL XL) 25 MG extended release tablet TAKE 1 TABLET DAILY 10/1/18   Shahzad Lobo MD   nitroGLYCERIN (NITROSTAT) 0.4 MG SL tablet Place 0.4 mg under the tongue 6/1/18   Historical Provider, MD   warfarin (COUMADIN) 3 MG tablet TAKE 1

## 2019-05-06 ENCOUNTER — HOSPITAL ENCOUNTER (OUTPATIENT)
Age: 79
Setting detail: SPECIMEN
Discharge: HOME | End: 2019-05-06
Payer: MEDICARE

## 2019-05-06 ENCOUNTER — OFFICE VISIT (OUTPATIENT)
Dept: INTERNAL MEDICINE CLINIC | Age: 79
End: 2019-05-06
Payer: MEDICARE

## 2019-05-06 VITALS
SYSTOLIC BLOOD PRESSURE: 104 MMHG | WEIGHT: 159 LBS | OXYGEN SATURATION: 99 % | DIASTOLIC BLOOD PRESSURE: 52 MMHG | HEART RATE: 61 BPM | BODY MASS INDEX: 29.26 KG/M2 | HEIGHT: 62 IN

## 2019-05-06 DIAGNOSIS — M79.671 RIGHT FOOT PAIN: ICD-10-CM

## 2019-05-06 DIAGNOSIS — M79.89 SWELLING OF RIGHT FOOT: ICD-10-CM

## 2019-05-06 DIAGNOSIS — M79.671 RIGHT FOOT PAIN: Primary | ICD-10-CM

## 2019-05-06 LAB
ABSOLUTE EOS #: 0.09 K/UL (ref 0–0.44)
ABSOLUTE IMMATURE GRANULOCYTE: <0.03 K/UL (ref 0–0.3)
ABSOLUTE LYMPH #: 1.94 K/UL (ref 1.1–3.7)
ABSOLUTE MONO #: 0.66 K/UL (ref 0.1–1.2)
BASOPHILS # BLD: 1 % (ref 0–2)
BASOPHILS ABSOLUTE: 0.06 K/UL (ref 0–0.2)
DIFFERENTIAL TYPE: NORMAL
EOSINOPHILS RELATIVE PERCENT: 1 % (ref 1–4)
HCT VFR BLD CALC: 46.3 % (ref 36.3–47.1)
HEMOGLOBIN: 14.7 G/DL (ref 11.9–15.1)
IMMATURE GRANULOCYTES: 0 %
LYMPHOCYTES # BLD: 26 % (ref 24–43)
MCH RBC QN AUTO: 32.5 PG (ref 25.2–33.5)
MCHC RBC AUTO-ENTMCNC: 31.7 G/DL (ref 28.4–34.8)
MCV RBC AUTO: 102.4 FL (ref 82.6–102.9)
MONOCYTES # BLD: 9 % (ref 3–12)
NRBC AUTOMATED: 0 PER 100 WBC
PDW BLD-RTO: 12.4 % (ref 11.8–14.4)
PLATELET # BLD: 219 K/UL (ref 138–453)
PLATELET ESTIMATE: NORMAL
PMV BLD AUTO: 10.2 FL (ref 8.1–13.5)
RBC # BLD: 4.52 M/UL (ref 3.95–5.11)
RBC # BLD: NORMAL 10*6/UL
SEG NEUTROPHILS: 63 % (ref 36–65)
SEGMENTED NEUTROPHILS ABSOLUTE COUNT: 4.78 K/UL (ref 1.5–8.1)
URIC ACID: 10.5 MG/DL (ref 2.4–5.7)
WBC # BLD: 7.6 K/UL (ref 3.5–11.3)
WBC # BLD: NORMAL 10*3/UL

## 2019-05-06 PROCEDURE — 1123F ACP DISCUSS/DSCN MKR DOCD: CPT | Performed by: PHYSICIAN ASSISTANT

## 2019-05-06 PROCEDURE — 1036F TOBACCO NON-USER: CPT | Performed by: PHYSICIAN ASSISTANT

## 2019-05-06 PROCEDURE — 4040F PNEUMOC VAC/ADMIN/RCVD: CPT | Performed by: PHYSICIAN ASSISTANT

## 2019-05-06 PROCEDURE — G8427 DOCREV CUR MEDS BY ELIG CLIN: HCPCS | Performed by: PHYSICIAN ASSISTANT

## 2019-05-06 PROCEDURE — G8598 ASA/ANTIPLAT THER USED: HCPCS | Performed by: PHYSICIAN ASSISTANT

## 2019-05-06 PROCEDURE — G8399 PT W/DXA RESULTS DOCUMENT: HCPCS | Performed by: PHYSICIAN ASSISTANT

## 2019-05-06 PROCEDURE — 1090F PRES/ABSN URINE INCON ASSESS: CPT | Performed by: PHYSICIAN ASSISTANT

## 2019-05-06 PROCEDURE — G8417 CALC BMI ABV UP PARAM F/U: HCPCS | Performed by: PHYSICIAN ASSISTANT

## 2019-05-06 PROCEDURE — 99213 OFFICE O/P EST LOW 20 MIN: CPT | Performed by: PHYSICIAN ASSISTANT

## 2019-05-06 RX ORDER — CEPHALEXIN 500 MG/1
500 CAPSULE ORAL 3 TIMES DAILY
Qty: 15 CAPSULE | Refills: 0 | Status: SHIPPED | OUTPATIENT
Start: 2019-05-06 | End: 2019-05-11

## 2019-05-06 ASSESSMENT — ENCOUNTER SYMPTOMS
COUGH: 0
SORE THROAT: 0
SINUS PAIN: 0
CONSTIPATION: 0
EYE PAIN: 0
ABDOMINAL PAIN: 0
RHINORRHEA: 0
CHEST TIGHTNESS: 0
BACK PAIN: 0
EYE DISCHARGE: 0
EYE ITCHING: 0
COLOR CHANGE: 1
SHORTNESS OF BREATH: 0
DIARRHEA: 0
VOMITING: 0
NAUSEA: 0
BLOOD IN STOOL: 0

## 2019-05-06 NOTE — PROGRESS NOTES
[facility]  [facility]  [address]  [address] [address]  Dept: [phone]  Dept Fax: [phone]    OfficeProgress/Follow Up Note  Date of patient's visit: 5/6/2019  Patient's Name:  Susana Uriarte YOB: 1940            Patient Care Team:  Madison Melendez MD as PCP - Marianela Woodward MD as PCP - S Attributed Provider  Cristina Lopez MD as Consulting Physician (Internal Medicine Cardiovascular Disease)  Gia Hunter MD as Consulting Physician (Internal Medicine)    REASON FOR VISIT:Same day visit    HISTORY OF PRESENT ILLNESS:      Chief Complaint   Patient presents with    Foot Swelling     rt foot swollen, red and painful, started on thursday      History was obtained from the patient. Susana Uriarte is a 77 y.o. female here today for above. Right foot pain/swelling. Symptoms started three days ago. No known injury. Foot is slightly red, swollen, warm. No open wound. No fever/chills. No lower leg swelling. On coumadin, therapeutic INR.     MEDICATIONS:      Current Outpatient Medications   Medication Sig Dispense Refill    sacubitril-valsartan (ENTRESTO) 24-26 MG per tablet Take 1 tablet by mouth 2 times daily      cephALEXin (KEFLEX) 500 MG capsule Take 1 capsule by mouth 3 times daily for 5 days 15 capsule 0    furosemide (LASIX) 40 MG tablet TAKE 1 TABLET TWICE A  tablet 3    glimepiride (AMARYL) 2 MG tablet TAKE 1 TABLET EVERY MORNING BEFORE BREAKFAST 90 tablet 3    warfarin (COUMADIN) 1 MG tablet TAKE 2 TABLETS EVERY OTHER DAY 90 tablet 3    metoprolol succinate (TOPROL XL) 25 MG extended release tablet TAKE 1 TABLET DAILY 90 tablet 3    nitroGLYCERIN (NITROSTAT) 0.4 MG SL tablet Place 0.4 mg under the tongue      warfarin (COUMADIN) 3 MG tablet TAKE 1 TABLET EVERY OTHER DAY AS DIRECTED 90 tablet 2    sotalol (BETAPACE) 80 MG tablet TAKE ONE-HALF (1/2) TABLET TWICE A DAY (Patient taking differently: TAKE ONE TABLET TWICE A DAY) 90 tablet 3    levothyroxine (SYNTHROID) 50 MCG tablet TAKE 1 TABLET EVERY MORNING 90 tablet 5    spironolactone (ALDACTONE) 25 MG tablet TAKE 1 TABLET EVERY MORNING 90 tablet 3    docusate sodium (COLACE) 100 MG capsule Take 100 mg by mouth every morning       aspirin 81 MG tablet Take 81 mg by mouth every morning        No current facility-administered medications for this visit. ALLERGIES:      Allergies   Allergen Reactions    Ciprofloxacin Itching    Other     Penicillins Diarrhea    Statins     Sulfa Antibiotics      SOCIAL HISTORY   Reviewed and no change from previous record. Bhavesh Pitts  reports that she quit smoking about 20 years ago. She has a 5.00 pack-year smoking history. She has never used smokeless tobacco.    FAMILY HISTORY:    Reviewed and No change from previous visit    REVIEW OF SYSTEMS:    Review of Systems   Constitutional: Negative for chills, fatigue and fever. HENT: Negative for congestion, ear discharge, ear pain, hearing loss, rhinorrhea, sinus pain and sore throat. Eyes: Negative for pain, discharge, itching and visual disturbance. Respiratory: Negative for cough, chest tightness and shortness of breath. Cardiovascular: Positive for leg swelling (right foot). Negative for chest pain and palpitations. Gastrointestinal: Negative for abdominal pain, blood in stool, constipation, diarrhea, nausea and vomiting. Genitourinary: Negative for dysuria, flank pain, frequency, hematuria and urgency. Musculoskeletal: Negative for arthralgias, back pain, neck pain and neck stiffness. Skin: Positive for color change. Negative for pallor, rash and wound. Neurological: Negative for dizziness, weakness, light-headedness, numbness and headaches.       PHYSICAL EXAM:      Vitals:    05/06/19 1528   BP: (!) 104/52   Pulse: 61   SpO2: 99%   Weight: 159 lb (72.1 kg)   Height: 5' 2.01\" (1.575 m)     General -alert, well appearing, and in no distress  Skin - normal coloration and turgor, no rashes  Mouth - mucous membranes are moist  Chest - clear to auscultation, no wheezes, rales or rhonchi, symmetric air entry  Heart - normal rate, regular rhythm, no murmur  Abdomen - soft, nontender, nondistended  Neurological - alert, oriented, normal speech, no focal sensory or motor deficit  Extremities - right foot with 1+ edema, slightly erythematous, warm, no wound appreciated, pulses 2+, no calf swelling, no pain to palpation    ASSESSMENT AND PLAN:      1. Right foot pain  2. Swelling of right foot  - Unspecified etiology, will order blood work, start antibiotics for possible cellulitis  - Uric Acid; Future  - CBC Auto Differential; Future  - cephALEXin (KEFLEX) 500 MG capsule; Take 1 capsule by mouth 3 times daily for 5 days  Dispense: 15 capsule; Refill: 0  - consider imaging if symptoms persist or worsening     FOLLOW UP AND INSTRUCTIONS:   Return in 1 week (on 5/13/2019), earlier if needed. Patient educated on signs and symptoms which require more emergent evaluation and treatment, instructed to return to clinic or seek urgent care should these develop, patient understands and agrees with plan. Discussed use, benefit, and side effects of prescribed medications. All patient questions answered. Patient voiced understanding. Patient given educational materials - see patient instructions    JAIRO Orantes [facility]  5/6/2019, 4:19 PM    Please note that this chart wasgenerated using voice recognition Dragon dictation software. Although every effort was made to ensure the accuracy of this automated transcription, some errors in transcription may have occurred.

## 2019-05-07 RX ORDER — PREDNISONE 20 MG/1
20 TABLET ORAL 2 TIMES DAILY
Qty: 10 TABLET | Refills: 0 | Status: SHIPPED | OUTPATIENT
Start: 2019-05-07 | End: 2019-05-12

## 2019-05-13 ENCOUNTER — TELEPHONE (OUTPATIENT)
Dept: INTERNAL MEDICINE CLINIC | Age: 79
End: 2019-05-13

## 2019-05-16 NOTE — TELEPHONE ENCOUNTER
Medication: Synthroid  Last visit: 5/6/19  Next visit: Visit date not found  Last refill: 10/31/17  Pharmacy: [facility]

## 2019-05-17 RX ORDER — LEVOTHYROXINE SODIUM 0.05 MG/1
50 TABLET ORAL DAILY
Qty: 90 TABLET | Refills: 3 | Status: SHIPPED | OUTPATIENT
Start: 2019-05-17 | End: 2020-01-01

## 2019-07-02 ENCOUNTER — OFFICE VISIT (OUTPATIENT)
Dept: INTERNAL MEDICINE CLINIC | Age: 79
End: 2019-07-02
Payer: MEDICARE

## 2019-07-02 VITALS
SYSTOLIC BLOOD PRESSURE: 122 MMHG | HEIGHT: 62 IN | WEIGHT: 158.95 LBS | BODY MASS INDEX: 29.25 KG/M2 | DIASTOLIC BLOOD PRESSURE: 62 MMHG

## 2019-07-02 DIAGNOSIS — I42.9 IDIOPATHIC CARDIOMYOPATHY (HCC): ICD-10-CM

## 2019-07-02 DIAGNOSIS — I50.20 SYSTOLIC CONGESTIVE HEART FAILURE, UNSPECIFIED HF CHRONICITY (HCC): Primary | ICD-10-CM

## 2019-07-02 DIAGNOSIS — I50.40 COMBINED SYSTOLIC AND DIASTOLIC CONGESTIVE HEART FAILURE, UNSPECIFIED HF CHRONICITY (HCC): ICD-10-CM

## 2019-07-02 DIAGNOSIS — M1A.0710 IDIOPATHIC CHRONIC GOUT OF RIGHT ANKLE WITHOUT TOPHUS: ICD-10-CM

## 2019-07-02 DIAGNOSIS — I25.10 CORONARY ARTERY DISEASE INVOLVING NATIVE CORONARY ARTERY OF NATIVE HEART WITHOUT ANGINA PECTORIS: ICD-10-CM

## 2019-07-02 DIAGNOSIS — E11.40 TYPE 2 DIABETES MELLITUS WITH DIABETIC NEUROPATHY, WITHOUT LONG-TERM CURRENT USE OF INSULIN (HCC): ICD-10-CM

## 2019-07-02 PROCEDURE — G8427 DOCREV CUR MEDS BY ELIG CLIN: HCPCS | Performed by: INTERNAL MEDICINE

## 2019-07-02 PROCEDURE — 1036F TOBACCO NON-USER: CPT | Performed by: INTERNAL MEDICINE

## 2019-07-02 PROCEDURE — G8399 PT W/DXA RESULTS DOCUMENT: HCPCS | Performed by: INTERNAL MEDICINE

## 2019-07-02 PROCEDURE — 1090F PRES/ABSN URINE INCON ASSESS: CPT | Performed by: INTERNAL MEDICINE

## 2019-07-02 PROCEDURE — G8417 CALC BMI ABV UP PARAM F/U: HCPCS | Performed by: INTERNAL MEDICINE

## 2019-07-02 PROCEDURE — G8598 ASA/ANTIPLAT THER USED: HCPCS | Performed by: INTERNAL MEDICINE

## 2019-07-02 PROCEDURE — 99214 OFFICE O/P EST MOD 30 MIN: CPT | Performed by: INTERNAL MEDICINE

## 2019-07-02 PROCEDURE — 1123F ACP DISCUSS/DSCN MKR DOCD: CPT | Performed by: INTERNAL MEDICINE

## 2019-07-02 PROCEDURE — 4040F PNEUMOC VAC/ADMIN/RCVD: CPT | Performed by: INTERNAL MEDICINE

## 2019-07-02 ASSESSMENT — ENCOUNTER SYMPTOMS
SHORTNESS OF BREATH: 1
EYES NEGATIVE: 1
CONSTIPATION: 0
ALLERGIC/IMMUNOLOGIC NEGATIVE: 1

## 2019-07-02 ASSESSMENT — PATIENT HEALTH QUESTIONNAIRE - PHQ9
SUM OF ALL RESPONSES TO PHQ QUESTIONS 1-9: 0
SUM OF ALL RESPONSES TO PHQ9 QUESTIONS 1 & 2: 0
1. LITTLE INTEREST OR PLEASURE IN DOING THINGS: 0
2. FEELING DOWN, DEPRESSED OR HOPELESS: 0
SUM OF ALL RESPONSES TO PHQ QUESTIONS 1-9: 0

## 2019-07-02 NOTE — PROGRESS NOTES
Wellness Visit (AWV)  09/14/2002    TSH testing  01/23/2018    Potassium monitoring  01/23/2018    Creatinine monitoring  01/23/2018    Shingles Vaccine (1 of 2) 11/07/2019 (Originally 9/14/1989)    DTaP/Tdap/Td vaccine (1 - Tdap) 12/12/2019 (Originally 9/14/1959)    Flu vaccine (1) 09/01/2019    DEXA (modify frequency per FRAX score)  Completed    Pneumococcal 65+ years Vaccine  Completed       Congestive Heart Failure   Presents for follow-up (she has no symptoms today) visit. Associated symptoms include shortness of breath. Pertinent negatives include no chest pain, edema, fatigue (she has no symptoms ), muscle weakness, near-syncope, nocturia, orthopnea or palpitations. The symptoms have been improving. Compliance with total regimen is 0-25%. Compliance problems include adherence to diet. Compliance with diet is %. Compliance with exercise is %. Compliance with medications is %. Side effects of treatment include headaches, limb pain, rash, urinary, visual, GI discomfort and auditory. Review of Systems   Constitutional: Negative for fatigue (she has no symptoms ). HENT: Negative for sneezing. Eyes: Negative. Respiratory: Positive for shortness of breath. Cardiovascular: Negative for chest pain, palpitations and near-syncope. Syncope    Gastrointestinal: Negative for constipation. Endocrine: Negative. Genitourinary: Negative. Negative for nocturia. Musculoskeletal: Negative for muscle weakness. Allergic/Immunologic: Negative. Neurological: Negative. Objective:   Physical Exam   Constitutional: She is oriented to person, place, and time. She appears well-developed and well-nourished. She is active and cooperative. She does not have a sickly appearance. No distress. HENT:   Head: Normocephalic and atraumatic. Head is without abrasion, without contusion and without laceration.    Right Ear: External ear normal.   Left Ear: External ear normal. Nose: No mucosal edema, nose lacerations or septal deviation. Mouth/Throat: Mucous membranes are not pale. No oropharyngeal exudate or posterior oropharyngeal edema. Eyes: Pupils are equal, round, and reactive to light. EOM are normal. Right eye exhibits no discharge. Left eye exhibits no discharge. No scleral icterus. Neck: Neck supple. No hepatojugular reflux present. Carotid bruit is not present. No thyroid mass and no thyromegaly present. Cardiovascular: Normal rate, regular rhythm and normal heart sounds. Exam reveals no S3. No murmur heard. Apical rate 60 per minute and regular today  She has HX of chronic atrial fibrillation but it is well controlled    Pulmonary/Chest: Effort normal and breath sounds normal. She has no wheezes. She has no rales. Abdominal: Soft. Musculoskeletal: Normal range of motion. She exhibits no edema or tenderness. Lymphadenopathy:     She has no cervical adenopathy. She has no axillary adenopathy. Right: No supraclavicular and no epitrochlear adenopathy present. Left: No supraclavicular and no epitrochlear adenopathy present. Neurological: She is alert and oriented to person, place, and time. She displays no atrophy and no tremor. No cranial nerve deficit. She exhibits normal muscle tone. She displays no seizure activity. Coordination and gait normal.   Skin: Skin is warm and dry. No bruising, no laceration, no petechiae and no rash noted. She is not diaphoretic. No cyanosis or erythema. No pallor. Nails show no clubbing. Bruise right calf   Psychiatric: She has a normal mood and affect. Her speech is normal and behavior is normal. Judgment and thought content normal. Cognition and memory are normal.   Vitals reviewed. Assessment:     Donald Sloan was seen today for diabetes and congestive heart failure.     Diagnoses and all orders for this visit:    Systolic congestive heart failure, unspecified HF chronicity (HCC)    Idiopathic cardiomyopathy [facility])    Coronary artery disease involving native coronary artery of native heart without angina pectoris    Combined systolic and diastolic congestive heart failure, unspecified HF chronicity ([facility] [address]    Type 2 diabetes mellitus with diabetic neuropathy, without long-term current use of insulin ([facility])    Idiopathic chronic gout of right ankle without tophus= she did have high uric acid but due to multiple medications she is not a candidate for allupurinol            Plan:        Return in about 3 months (around 9/24/2019) for Follow Up, heart failure, diabetes mellitus. No orders of the defined types were placed in this encounter. No orders of the defined types were placed in this encounter.          Akil Estrada MD

## 2019-08-06 ENCOUNTER — TELEPHONE (OUTPATIENT)
Dept: INTERNAL MEDICINE CLINIC | Age: 79
End: 2019-08-06

## 2019-08-06 RX ORDER — COLCHICINE 0.6 MG/1
0.6 TABLET ORAL DAILY
Qty: 15 TABLET | Refills: 0 | Status: SHIPPED | OUTPATIENT
Start: 2019-08-06 | End: 2019-01-01 | Stop reason: ALTCHOICE

## 2019-08-07 RX ORDER — COLCHICINE 0.6 MG/1
0.6 TABLET, FILM COATED ORAL DAILY
Qty: 15 TABLET | Refills: 0 | Status: SHIPPED | OUTPATIENT
Start: 2019-08-07 | End: 2019-01-01 | Stop reason: ALTCHOICE

## 2020-01-01 ENCOUNTER — HOSPITAL ENCOUNTER (OUTPATIENT)
Age: 80
Setting detail: SPECIMEN
Discharge: HOME | End: 2020-06-04
Payer: MEDICARE

## 2020-01-01 ENCOUNTER — CARE COORDINATION (OUTPATIENT)
Dept: CASE MANAGEMENT | Age: 80
End: 2020-01-01

## 2020-01-01 ENCOUNTER — OFFICE VISIT (OUTPATIENT)
Dept: INTERNAL MEDICINE CLINIC | Age: 80
End: 2020-01-01
Payer: MEDICARE

## 2020-01-01 ENCOUNTER — APPOINTMENT (OUTPATIENT)
Dept: GENERAL RADIOLOGY | Age: 80
DRG: 291 | End: 2020-01-01
Payer: MEDICARE

## 2020-01-01 ENCOUNTER — TELEPHONE (OUTPATIENT)
Dept: OTHER | Facility: CLINIC | Age: 80
End: 2020-01-01

## 2020-01-01 ENCOUNTER — CARE COORDINATION (OUTPATIENT)
Dept: CARE COORDINATION | Age: 80
End: 2020-01-01

## 2020-01-01 ENCOUNTER — HOSPITAL ENCOUNTER (OUTPATIENT)
Age: 80
Setting detail: SPECIMEN
Discharge: HOME | End: 2020-04-22
Payer: MEDICARE

## 2020-01-01 ENCOUNTER — HOSPITAL ENCOUNTER (OUTPATIENT)
Age: 80
Setting detail: SPECIMEN
Discharge: HOME | End: 2020-04-28
Payer: MEDICARE

## 2020-01-01 ENCOUNTER — TELEPHONE (OUTPATIENT)
Dept: INTERNAL MEDICINE CLINIC | Age: 80
End: 2020-01-01

## 2020-01-01 ENCOUNTER — OUTSIDE SERVICES (OUTPATIENT)
Dept: PRIMARY CARE CLINIC | Age: 80
End: 2020-01-01

## 2020-01-01 ENCOUNTER — HOSPITAL ENCOUNTER (OUTPATIENT)
Age: 80
Setting detail: SPECIMEN
Discharge: HOME | End: 2020-07-28
Payer: MEDICARE

## 2020-01-01 ENCOUNTER — HOSPITAL ENCOUNTER (OUTPATIENT)
Age: 80
Setting detail: SPECIMEN
Discharge: HOME | End: 2020-05-05
Payer: MEDICARE

## 2020-01-01 ENCOUNTER — HOSPITAL ENCOUNTER (OUTPATIENT)
Age: 80
Setting detail: SPECIMEN
Discharge: HOME | End: 2020-06-09
Payer: MEDICARE

## 2020-01-01 ENCOUNTER — HOSPITAL ENCOUNTER (INPATIENT)
Age: 80
LOS: 4 days | Discharge: HOME | DRG: 291 | End: 2020-03-21
Attending: EMERGENCY MEDICINE | Admitting: INTERNAL MEDICINE
Payer: MEDICARE

## 2020-01-01 ENCOUNTER — HOSPITAL ENCOUNTER (OUTPATIENT)
Age: 80
Setting detail: SPECIMEN
Discharge: HOME | End: 2020-05-18
Payer: MEDICARE

## 2020-01-01 ENCOUNTER — TELEPHONE (OUTPATIENT)
Dept: PRIMARY CARE CLINIC | Age: 80
End: 2020-01-01

## 2020-01-01 ENCOUNTER — APPOINTMENT (OUTPATIENT)
Dept: GENERAL RADIOLOGY | Age: 80
End: 2020-01-01
Payer: MEDICARE

## 2020-01-01 ENCOUNTER — HOSPITAL ENCOUNTER (EMERGENCY)
Age: 80
Discharge: HOME | End: 2020-04-17
Attending: EMERGENCY MEDICINE
Payer: MEDICARE

## 2020-01-01 ENCOUNTER — APPOINTMENT (OUTPATIENT)
Dept: GENERAL RADIOLOGY | Age: 80
DRG: 309 | End: 2020-01-01
Payer: MEDICARE

## 2020-01-01 ENCOUNTER — HOSPITAL ENCOUNTER (OUTPATIENT)
Age: 80
Setting detail: SPECIMEN
Discharge: HOME | End: 2020-07-15
Payer: MEDICARE

## 2020-01-01 ENCOUNTER — APPOINTMENT (OUTPATIENT)
Dept: CT IMAGING | Age: 80
End: 2020-01-01
Payer: MEDICARE

## 2020-01-01 ENCOUNTER — HOSPITAL ENCOUNTER (EMERGENCY)
Age: 80
Discharge: HOME | End: 2020-05-08
Attending: EMERGENCY MEDICINE
Payer: MEDICARE

## 2020-01-01 ENCOUNTER — HOSPITAL ENCOUNTER (OUTPATIENT)
Age: 80
Setting detail: SPECIMEN
Discharge: HOME | End: 2020-03-11
Payer: MEDICARE

## 2020-01-01 ENCOUNTER — HOSPITAL ENCOUNTER (INPATIENT)
Age: 80
LOS: 6 days | Discharge: HOME | DRG: 309 | End: 2020-01-09
Attending: EMERGENCY MEDICINE | Admitting: INTERNAL MEDICINE
Payer: MEDICARE

## 2020-01-01 ENCOUNTER — HOSPITAL ENCOUNTER (INPATIENT)
Age: 80
LOS: 2 days | Discharge: HOME HEALTH | DRG: 291 | End: 2020-04-04
Attending: EMERGENCY MEDICINE | Admitting: INTERNAL MEDICINE
Payer: MEDICARE

## 2020-01-01 ENCOUNTER — HOSPITAL ENCOUNTER (EMERGENCY)
Age: 80
Discharge: LEFT BEFORE DISCHARGE | End: 2020-06-20
Attending: EMERGENCY MEDICINE
Payer: MEDICARE

## 2020-01-01 ENCOUNTER — HOSPITAL ENCOUNTER (EMERGENCY)
Age: 80
Discharge: HOME | End: 2020-04-26
Attending: EMERGENCY MEDICINE
Payer: MEDICARE

## 2020-01-01 VITALS
WEIGHT: 167 LBS | SYSTOLIC BLOOD PRESSURE: 104 MMHG | HEART RATE: 64 BPM | RESPIRATION RATE: 14 BRPM | DIASTOLIC BLOOD PRESSURE: 62 MMHG | HEIGHT: 63 IN | BODY MASS INDEX: 29.59 KG/M2

## 2020-01-01 VITALS
HEIGHT: 63 IN | BODY MASS INDEX: 26.93 KG/M2 | DIASTOLIC BLOOD PRESSURE: 73 MMHG | RESPIRATION RATE: 16 BRPM | TEMPERATURE: 97.4 F | SYSTOLIC BLOOD PRESSURE: 101 MMHG | OXYGEN SATURATION: 97 % | WEIGHT: 152 LBS | HEART RATE: 74 BPM

## 2020-01-01 VITALS
SYSTOLIC BLOOD PRESSURE: 104 MMHG | OXYGEN SATURATION: 95 % | WEIGHT: 152 LBS | HEIGHT: 63 IN | BODY MASS INDEX: 26.93 KG/M2 | HEART RATE: 70 BPM | DIASTOLIC BLOOD PRESSURE: 72 MMHG

## 2020-01-01 VITALS
RESPIRATION RATE: 16 BRPM | SYSTOLIC BLOOD PRESSURE: 100 MMHG | OXYGEN SATURATION: 97 % | BODY MASS INDEX: 29.14 KG/M2 | HEIGHT: 63 IN | HEART RATE: 74 BPM | DIASTOLIC BLOOD PRESSURE: 60 MMHG | TEMPERATURE: 97.7 F | WEIGHT: 164.46 LBS

## 2020-01-01 VITALS
HEIGHT: 63 IN | SYSTOLIC BLOOD PRESSURE: 96 MMHG | WEIGHT: 166.01 LBS | TEMPERATURE: 98.3 F | DIASTOLIC BLOOD PRESSURE: 65 MMHG | OXYGEN SATURATION: 100 % | BODY MASS INDEX: 29.41 KG/M2 | HEART RATE: 73 BPM | RESPIRATION RATE: 16 BRPM

## 2020-01-01 VITALS
BODY MASS INDEX: 29.23 KG/M2 | HEIGHT: 63 IN | SYSTOLIC BLOOD PRESSURE: 122 MMHG | WEIGHT: 165 LBS | HEART RATE: 79 BPM | OXYGEN SATURATION: 96 % | DIASTOLIC BLOOD PRESSURE: 74 MMHG

## 2020-01-01 VITALS
DIASTOLIC BLOOD PRESSURE: 74 MMHG | HEART RATE: 73 BPM | SYSTOLIC BLOOD PRESSURE: 91 MMHG | RESPIRATION RATE: 14 BRPM | HEIGHT: 63 IN | WEIGHT: 148 LBS | BODY MASS INDEX: 26.22 KG/M2 | TEMPERATURE: 97.8 F | OXYGEN SATURATION: 98 %

## 2020-01-01 VITALS
HEIGHT: 63 IN | HEART RATE: 64 BPM | WEIGHT: 151 LBS | DIASTOLIC BLOOD PRESSURE: 64 MMHG | BODY MASS INDEX: 26.75 KG/M2 | SYSTOLIC BLOOD PRESSURE: 92 MMHG | OXYGEN SATURATION: 92 %

## 2020-01-01 VITALS
TEMPERATURE: 97.7 F | DIASTOLIC BLOOD PRESSURE: 64 MMHG | BODY MASS INDEX: 28.17 KG/M2 | HEIGHT: 63 IN | SYSTOLIC BLOOD PRESSURE: 110 MMHG | WEIGHT: 159 LBS | RESPIRATION RATE: 18 BRPM | HEART RATE: 72 BPM | OXYGEN SATURATION: 96 %

## 2020-01-01 VITALS
HEIGHT: 63 IN | HEART RATE: 73 BPM | SYSTOLIC BLOOD PRESSURE: 102 MMHG | BODY MASS INDEX: 26.22 KG/M2 | TEMPERATURE: 97.2 F | OXYGEN SATURATION: 97 % | DIASTOLIC BLOOD PRESSURE: 68 MMHG | WEIGHT: 148 LBS

## 2020-01-01 VITALS
HEIGHT: 63 IN | OXYGEN SATURATION: 99 % | RESPIRATION RATE: 18 BRPM | DIASTOLIC BLOOD PRESSURE: 72 MMHG | SYSTOLIC BLOOD PRESSURE: 105 MMHG | BODY MASS INDEX: 26.75 KG/M2 | WEIGHT: 151 LBS | HEART RATE: 74 BPM | TEMPERATURE: 96.5 F

## 2020-01-01 VITALS
TEMPERATURE: 97.7 F | OXYGEN SATURATION: 93 % | WEIGHT: 159.17 LBS | HEART RATE: 76 BPM | BODY MASS INDEX: 28.2 KG/M2 | HEIGHT: 63 IN | RESPIRATION RATE: 16 BRPM | SYSTOLIC BLOOD PRESSURE: 101 MMHG | DIASTOLIC BLOOD PRESSURE: 67 MMHG

## 2020-01-01 VITALS
HEART RATE: 71 BPM | BODY MASS INDEX: 25.34 KG/M2 | DIASTOLIC BLOOD PRESSURE: 64 MMHG | OXYGEN SATURATION: 97 % | WEIGHT: 143 LBS | SYSTOLIC BLOOD PRESSURE: 96 MMHG | TEMPERATURE: 96.6 F | HEIGHT: 63 IN

## 2020-01-01 VITALS
DIASTOLIC BLOOD PRESSURE: 64 MMHG | WEIGHT: 148 LBS | TEMPERATURE: 97.8 F | HEIGHT: 63 IN | SYSTOLIC BLOOD PRESSURE: 104 MMHG | OXYGEN SATURATION: 96 % | BODY MASS INDEX: 26.22 KG/M2 | HEART RATE: 70 BPM

## 2020-01-01 VITALS
HEART RATE: 71 BPM | TEMPERATURE: 97.9 F | SYSTOLIC BLOOD PRESSURE: 89 MMHG | RESPIRATION RATE: 18 BRPM | DIASTOLIC BLOOD PRESSURE: 64 MMHG

## 2020-01-01 LAB
-: ABNORMAL
-: NORMAL
ABSOLUTE EOS #: 0.1 K/UL (ref 0–0.4)
ABSOLUTE EOS #: 0.15 K/UL (ref 0–0.44)
ABSOLUTE EOS #: 0.2 K/UL (ref 0–0.4)
ABSOLUTE EOS #: 0.51 K/UL (ref 0–0.44)
ABSOLUTE IMMATURE GRANULOCYTE: 0.03 K/UL (ref 0–0.3)
ABSOLUTE IMMATURE GRANULOCYTE: 0.04 K/UL (ref 0–0.3)
ABSOLUTE IMMATURE GRANULOCYTE: ABNORMAL K/UL (ref 0–0.3)
ABSOLUTE IMMATURE GRANULOCYTE: NORMAL K/UL (ref 0–0.3)
ABSOLUTE LYMPH #: 0.73 K/UL (ref 1.1–3.7)
ABSOLUTE LYMPH #: 1.2 K/UL (ref 1–4.8)
ABSOLUTE LYMPH #: 1.4 K/UL (ref 1–4.8)
ABSOLUTE LYMPH #: 1.44 K/UL (ref 1.1–3.7)
ABSOLUTE LYMPH #: 1.9 K/UL (ref 1–4.8)
ABSOLUTE LYMPH #: 1.9 K/UL (ref 1–4.8)
ABSOLUTE LYMPH #: 2.1 K/UL (ref 1–4.8)
ABSOLUTE MONO #: 0.4 K/UL (ref 0.1–1.3)
ABSOLUTE MONO #: 0.5 K/UL (ref 0.1–1.3)
ABSOLUTE MONO #: 0.61 K/UL (ref 0.1–1.2)
ABSOLUTE MONO #: 0.7 K/UL (ref 0.1–1.3)
ABSOLUTE MONO #: 0.71 K/UL (ref 0.1–1.2)
ALBUMIN SERPL-MCNC: 3.3 G/DL (ref 3.5–5.2)
ALBUMIN SERPL-MCNC: 3.3 G/DL (ref 3.5–5.2)
ALBUMIN SERPL-MCNC: 3.4 G/DL (ref 3.5–5.2)
ALBUMIN SERPL-MCNC: 3.5 G/DL (ref 3.5–5.2)
ALBUMIN SERPL-MCNC: 3.6 G/DL (ref 3.5–5.2)
ALBUMIN SERPL-MCNC: 3.7 G/DL (ref 3.5–5.2)
ALBUMIN SERPL-MCNC: 3.7 G/DL (ref 3.5–5.2)
ALBUMIN SERPL-MCNC: 4.1 G/DL (ref 3.5–5.2)
ALBUMIN/GLOBULIN RATIO: 1.2 (ref 1–2.5)
ALBUMIN/GLOBULIN RATIO: ABNORMAL (ref 1–2.5)
ALP BLD-CCNC: 70 U/L (ref 35–104)
ALP BLD-CCNC: 81 U/L (ref 35–104)
ALP BLD-CCNC: 93 U/L (ref 35–104)
ALP BLD-CCNC: 95 U/L (ref 35–104)
ALT SERPL-CCNC: 12 U/L (ref 5–33)
ALT SERPL-CCNC: 13 U/L (ref 5–33)
ALT SERPL-CCNC: 14 U/L (ref 5–33)
ALT SERPL-CCNC: 15 U/L (ref 5–33)
ALT SERPL-CCNC: 15 U/L (ref 5–33)
ALT SERPL-CCNC: 20 U/L (ref 5–33)
AMORPHOUS: ABNORMAL
AMORPHOUS: NORMAL
AMORPHOUS: NORMAL
ANA REFERENCE RANGE:: ABNORMAL
ANION GAP SERPL CALCULATED.3IONS-SCNC: 10 MMOL/L (ref 9–17)
ANION GAP SERPL CALCULATED.3IONS-SCNC: 12 MMOL/L (ref 9–17)
ANION GAP SERPL CALCULATED.3IONS-SCNC: 13 MMOL/L (ref 9–17)
ANION GAP SERPL CALCULATED.3IONS-SCNC: 14 MMOL/L (ref 9–17)
ANION GAP SERPL CALCULATED.3IONS-SCNC: 15 MMOL/L (ref 9–17)
ANION GAP SERPL CALCULATED.3IONS-SCNC: 15 MMOL/L (ref 9–17)
ANION GAP SERPL CALCULATED.3IONS-SCNC: 16 MMOL/L (ref 9–17)
ANION GAP SERPL CALCULATED.3IONS-SCNC: 16 MMOL/L (ref 9–17)
ANION GAP SERPL CALCULATED.3IONS-SCNC: 19 MMOL/L (ref 9–17)
ANION GAP SERPL CALCULATED.3IONS-SCNC: 19 MMOL/L (ref 9–17)
ANTI DNA DOUBLE STRANDED: 30 IU/ML
ANTI JO-1 IGG: 7 U/ML
ANTI RNP AB: 189 U/ML
ANTI SSA: 10 U/ML
ANTI SSB: 9 U/ML
ANTI-CENTROMERE: 26 U/ML
ANTI-NUCLEAR ANTIBODY (ANA): POSITIVE
ANTI-SCLERODERMA: 25 U/ML
ANTI-SMITH: 10 U/ML
AST SERPL-CCNC: 18 U/L
AST SERPL-CCNC: 22 U/L
AST SERPL-CCNC: 23 U/L
AST SERPL-CCNC: 24 U/L
AST SERPL-CCNC: 25 U/L
AST SERPL-CCNC: 28 U/L
BACTERIA: ABNORMAL
BACTERIA: NORMAL
BACTERIA: NORMAL
BASOPHILS # BLD: 0 % (ref 0–2)
BASOPHILS # BLD: 0 % (ref 0–2)
BASOPHILS # BLD: 1 % (ref 0–2)
BASOPHILS ABSOLUTE: 0.04 K/UL (ref 0–0.2)
BASOPHILS ABSOLUTE: 0.1 K/UL (ref 0–0.2)
BASOPHILS ABSOLUTE: <0.03 K/UL (ref 0–0.2)
BILIRUB SERPL-MCNC: 0.8 MG/DL (ref 0.3–1.2)
BILIRUB SERPL-MCNC: 0.89 MG/DL (ref 0.3–1.2)
BILIRUB SERPL-MCNC: 1.1 MG/DL (ref 0.3–1.2)
BILIRUB SERPL-MCNC: 1.24 MG/DL (ref 0.3–1.2)
BILIRUBIN URINE: NEGATIVE
BNP INTERPRETATION: ABNORMAL
BUN BLDV-MCNC: 17 MG/DL (ref 8–23)
BUN BLDV-MCNC: 17 MG/DL (ref 8–23)
BUN BLDV-MCNC: 18 MG/DL (ref 8–23)
BUN BLDV-MCNC: 18 MG/DL (ref 8–23)
BUN BLDV-MCNC: 19 MG/DL (ref 8–23)
BUN BLDV-MCNC: 20 MG/DL (ref 8–23)
BUN BLDV-MCNC: 21 MG/DL (ref 8–23)
BUN BLDV-MCNC: 21 MG/DL (ref 8–23)
BUN BLDV-MCNC: 22 MG/DL (ref 8–23)
BUN BLDV-MCNC: 22 MG/DL (ref 8–23)
BUN BLDV-MCNC: 24 MG/DL (ref 8–23)
BUN BLDV-MCNC: 25 MG/DL (ref 8–23)
BUN BLDV-MCNC: 29 MG/DL (ref 8–23)
BUN BLDV-MCNC: 31 MG/DL (ref 8–23)
BUN BLDV-MCNC: 38 MG/DL
BUN BLDV-MCNC: 44 MG/DL (ref 8–23)
BUN BLDV-MCNC: 57 MG/DL
BUN BLDV-MCNC: 66 MG/DL
BUN/CREAT BLD: ABNORMAL (ref 9–20)
C-REACTIVE PROTEIN: 8.7 MG/L (ref 0–5)
CALCIUM SERPL-MCNC: 10 MG/DL
CALCIUM SERPL-MCNC: 10.1 MG/DL (ref 8.6–10.4)
CALCIUM SERPL-MCNC: 10.5 MG/DL
CALCIUM SERPL-MCNC: 8.4 MG/DL (ref 8.6–10.4)
CALCIUM SERPL-MCNC: 8.5 MG/DL (ref 8.6–10.4)
CALCIUM SERPL-MCNC: 8.6 MG/DL (ref 8.6–10.4)
CALCIUM SERPL-MCNC: 8.6 MG/DL (ref 8.6–10.4)
CALCIUM SERPL-MCNC: 8.7 MG/DL (ref 8.6–10.4)
CALCIUM SERPL-MCNC: 8.9 MG/DL (ref 8.6–10.4)
CALCIUM SERPL-MCNC: 8.9 MG/DL (ref 8.6–10.4)
CALCIUM SERPL-MCNC: 9 MG/DL (ref 8.6–10.4)
CALCIUM SERPL-MCNC: 9.1 MG/DL (ref 8.6–10.4)
CALCIUM SERPL-MCNC: 9.1 MG/DL (ref 8.6–10.4)
CALCIUM SERPL-MCNC: 9.2 MG/DL (ref 8.6–10.4)
CALCIUM SERPL-MCNC: 9.2 MG/DL (ref 8.6–10.4)
CALCIUM SERPL-MCNC: 9.4 MG/DL (ref 8.6–10.4)
CALCIUM SERPL-MCNC: 9.8 MG/DL
CALCIUM SERPL-MCNC: 9.8 MG/DL (ref 8.6–10.4)
CASTS UA: ABNORMAL /LPF
CASTS UA: ABNORMAL /LPF (ref 0–2)
CASTS UA: ABNORMAL /LPF (ref 0–8)
CASTS UA: NORMAL /LPF (ref 0–8)
CASTS UA: NORMAL /LPF (ref 0–8)
CHLORIDE BLD-SCNC: 100 MMOL/L (ref 98–107)
CHLORIDE BLD-SCNC: 100 MMOL/L (ref 98–107)
CHLORIDE BLD-SCNC: 101 MMOL/L (ref 98–107)
CHLORIDE BLD-SCNC: 101 MMOL/L (ref 98–107)
CHLORIDE BLD-SCNC: 104 MMOL/L (ref 98–107)
CHLORIDE BLD-SCNC: 72 MMOL/L (ref 98–107)
CHLORIDE BLD-SCNC: 75 MMOL/L
CHLORIDE BLD-SCNC: 76 MMOL/L
CHLORIDE BLD-SCNC: 83 MMOL/L
CHLORIDE BLD-SCNC: 86 MMOL/L (ref 98–107)
CHLORIDE BLD-SCNC: 91 MMOL/L (ref 98–107)
CHLORIDE BLD-SCNC: 91 MMOL/L (ref 98–107)
CHLORIDE BLD-SCNC: 94 MMOL/L (ref 98–107)
CHLORIDE BLD-SCNC: 95 MMOL/L (ref 98–107)
CHLORIDE BLD-SCNC: 95 MMOL/L (ref 98–107)
CHLORIDE BLD-SCNC: 96 MMOL/L (ref 98–107)
CHLORIDE BLD-SCNC: 97 MMOL/L (ref 98–107)
CHLORIDE BLD-SCNC: 98 MMOL/L (ref 98–107)
CHLORIDE BLD-SCNC: 99 MMOL/L (ref 98–107)
CHOLESTEROL/HDL RATIO: 2.4
CHOLESTEROL: 176 MG/DL
CO2: 19 MMOL/L (ref 20–31)
CO2: 19 MMOL/L (ref 20–31)
CO2: 20 MMOL/L (ref 20–31)
CO2: 22 MMOL/L (ref 20–31)
CO2: 22 MMOL/L (ref 20–31)
CO2: 23 MMOL/L (ref 20–31)
CO2: 24 MMOL/L (ref 20–31)
CO2: 25 MMOL/L (ref 20–31)
CO2: 25 MMOL/L (ref 20–31)
CO2: 26 MMOL/L (ref 20–31)
CO2: 27 MMOL/L (ref 20–31)
CO2: 27 MMOL/L (ref 20–31)
CO2: 28 MMOL/L (ref 20–31)
CO2: 28 MMOL/L (ref 20–31)
CO2: 29 MMOL/L (ref 20–31)
CO2: 30 MMOL/L (ref 20–31)
CO2: 31 MMOL/L
CO2: 34 MMOL/L
CO2: 36 MMOL/L
CO2: 36 MMOL/L (ref 20–31)
COLOR: YELLOW
COMMENT UA: ABNORMAL
CREAT SERPL-MCNC: 0.94 MG/DL (ref 0.5–0.9)
CREAT SERPL-MCNC: 0.96 MG/DL (ref 0.5–0.9)
CREAT SERPL-MCNC: 1.01 MG/DL (ref 0.5–0.9)
CREAT SERPL-MCNC: 1.06 MG/DL (ref 0.5–0.9)
CREAT SERPL-MCNC: 1.06 MG/DL (ref 0.5–0.9)
CREAT SERPL-MCNC: 1.07 MG/DL (ref 0.5–0.9)
CREAT SERPL-MCNC: 1.08 MG/DL (ref 0.5–0.9)
CREAT SERPL-MCNC: 1.09 MG/DL (ref 0.5–0.9)
CREAT SERPL-MCNC: 1.11 MG/DL (ref 0.5–0.9)
CREAT SERPL-MCNC: 1.12 MG/DL (ref 0.5–0.9)
CREAT SERPL-MCNC: 1.12 MG/DL (ref 0.5–0.9)
CREAT SERPL-MCNC: 1.18 MG/DL (ref 0.5–0.9)
CREAT SERPL-MCNC: 1.18 MG/DL (ref 0.5–0.9)
CREAT SERPL-MCNC: 1.22 MG/DL (ref 0.5–0.9)
CREAT SERPL-MCNC: 1.37 MG/DL (ref 0.5–0.9)
CREAT SERPL-MCNC: 1.46 MG/DL (ref 0.5–0.9)
CREAT SERPL-MCNC: 1.65 MG/DL (ref 0.5–0.9)
CREAT SERPL-MCNC: 1.74 MG/DL (ref 0.5–0.9)
CREAT SERPL-MCNC: 1.85 MG/DL (ref 0.5–0.9)
CREAT SERPL-MCNC: 1.92 MG/DL
CREAT SERPL-MCNC: 2.38 MG/DL
CREAT SERPL-MCNC: 2.43 MG/DL
CREATININE URINE: 71.2 MG/DL (ref 28–217)
CRYSTALS, UA: ABNORMAL /HPF
CRYSTALS, UA: NORMAL /HPF
CRYSTALS, UA: NORMAL /HPF
CULTURE: ABNORMAL
CULTURE: NORMAL
DIFFERENTIAL TYPE: ABNORMAL
DIFFERENTIAL TYPE: NORMAL
EKG ATRIAL RATE: 127 BPM
EKG ATRIAL RATE: 129 BPM
EKG ATRIAL RATE: 300 BPM
EKG ATRIAL RATE: 312 BPM
EKG ATRIAL RATE: 357 BPM
EKG ATRIAL RATE: 41 BPM
EKG ATRIAL RATE: 54 BPM
EKG ATRIAL RATE: 68 BPM
EKG ATRIAL RATE: 68 BPM
EKG Q-T INTERVAL: 482 MS
EKG Q-T INTERVAL: 490 MS
EKG Q-T INTERVAL: 500 MS
EKG Q-T INTERVAL: 524 MS
EKG Q-T INTERVAL: 542 MS
EKG Q-T INTERVAL: 548 MS
EKG Q-T INTERVAL: 556 MS
EKG Q-T INTERVAL: 580 MS
EKG Q-T INTERVAL: 666 MS
EKG QRS DURATION: 144 MS
EKG QRS DURATION: 148 MS
EKG QRS DURATION: 150 MS
EKG QRS DURATION: 152 MS
EKG QRS DURATION: 156 MS
EKG QRS DURATION: 156 MS
EKG QRS DURATION: 158 MS
EKG QTC CALCULATION (BAZETT): 511 MS
EKG QTC CALCULATION (BAZETT): 517 MS
EKG QTC CALCULATION (BAZETT): 527 MS
EKG QTC CALCULATION (BAZETT): 535 MS
EKG QTC CALCULATION (BAZETT): 542 MS
EKG QTC CALCULATION (BAZETT): 593 MS
EKG QTC CALCULATION (BAZETT): 600 MS
EKG QTC CALCULATION (BAZETT): 625 MS
EKG QTC CALCULATION (BAZETT): 692 MS
EKG R AXIS: -68 DEGREES
EKG R AXIS: -72 DEGREES
EKG R AXIS: -75 DEGREES
EKG R AXIS: -82 DEGREES
EKG R AXIS: -83 DEGREES
EKG R AXIS: -83 DEGREES
EKG R AXIS: -85 DEGREES
EKG R AXIS: -85 DEGREES
EKG R AXIS: -86 DEGREES
EKG T AXIS: 106 DEGREES
EKG T AXIS: 106 DEGREES
EKG T AXIS: 117 DEGREES
EKG T AXIS: 119 DEGREES
EKG T AXIS: 133 DEGREES
EKG T AXIS: 134 DEGREES
EKG T AXIS: 160 DEGREES
EKG T AXIS: 71 DEGREES
EKG T AXIS: 96 DEGREES
EKG VENTRICULAR RATE: 60 BPM
EKG VENTRICULAR RATE: 61 BPM
EKG VENTRICULAR RATE: 63 BPM
EKG VENTRICULAR RATE: 63 BPM
EKG VENTRICULAR RATE: 65 BPM
EKG VENTRICULAR RATE: 67 BPM
EKG VENTRICULAR RATE: 72 BPM
EKG VENTRICULAR RATE: 74 BPM
EKG VENTRICULAR RATE: 76 BPM
EOSINOPHILS RELATIVE PERCENT: 1 % (ref 0–4)
EOSINOPHILS RELATIVE PERCENT: 1 % (ref 0–4)
EOSINOPHILS RELATIVE PERCENT: 1 % (ref 1–4)
EOSINOPHILS RELATIVE PERCENT: 2 % (ref 0–4)
EOSINOPHILS RELATIVE PERCENT: 2 % (ref 0–4)
EOSINOPHILS RELATIVE PERCENT: 4 % (ref 0–4)
EOSINOPHILS RELATIVE PERCENT: 5 % (ref 1–4)
EPITHELIAL CELLS UA: ABNORMAL /HPF
EPITHELIAL CELLS UA: ABNORMAL /HPF (ref 0–5)
EPITHELIAL CELLS UA: NORMAL /HPF (ref 0–5)
EPITHELIAL CELLS UA: NORMAL /HPF (ref 0–5)
ESTIMATED AVERAGE GLUCOSE: 128 MG/DL
FREE KAPPA/LAMBDA RATIO: 1.11 (ref 0.26–1.65)
GFR AFRICAN AMERICAN: 32 ML/MIN
GFR AFRICAN AMERICAN: 34 ML/MIN
GFR AFRICAN AMERICAN: 36 ML/MIN
GFR AFRICAN AMERICAN: 42 ML/MIN
GFR AFRICAN AMERICAN: 45 ML/MIN
GFR AFRICAN AMERICAN: 51 ML/MIN
GFR AFRICAN AMERICAN: 53 ML/MIN
GFR AFRICAN AMERICAN: 53 ML/MIN
GFR AFRICAN AMERICAN: 57 ML/MIN
GFR AFRICAN AMERICAN: 59 ML/MIN
GFR AFRICAN AMERICAN: 59 ML/MIN
GFR AFRICAN AMERICAN: 60 ML/MIN
GFR AFRICAN AMERICAN: >60 ML/MIN
GFR CALCULATED: 19
GFR CALCULATED: 24
GFR CALCULATED: 30
GFR NON-AFRICAN AMERICAN: 26 ML/MIN
GFR NON-AFRICAN AMERICAN: 28 ML/MIN
GFR NON-AFRICAN AMERICAN: 30 ML/MIN
GFR NON-AFRICAN AMERICAN: 34 ML/MIN
GFR NON-AFRICAN AMERICAN: 37 ML/MIN
GFR NON-AFRICAN AMERICAN: 42 ML/MIN
GFR NON-AFRICAN AMERICAN: 44 ML/MIN
GFR NON-AFRICAN AMERICAN: 44 ML/MIN
GFR NON-AFRICAN AMERICAN: 47 ML/MIN
GFR NON-AFRICAN AMERICAN: 48 ML/MIN
GFR NON-AFRICAN AMERICAN: 49 ML/MIN
GFR NON-AFRICAN AMERICAN: 49 ML/MIN
GFR NON-AFRICAN AMERICAN: 50 ML/MIN
GFR NON-AFRICAN AMERICAN: 50 ML/MIN
GFR NON-AFRICAN AMERICAN: 53 ML/MIN
GFR NON-AFRICAN AMERICAN: 56 ML/MIN
GFR NON-AFRICAN AMERICAN: 57 ML/MIN
GFR SERPL CREATININE-BSD FRML MDRD: ABNORMAL ML/MIN/{1.73_M2}
GLUCOSE BLD-MCNC: 100 MG/DL (ref 65–105)
GLUCOSE BLD-MCNC: 100 MG/DL (ref 70–99)
GLUCOSE BLD-MCNC: 101 MG/DL (ref 65–105)
GLUCOSE BLD-MCNC: 102 MG/DL (ref 65–105)
GLUCOSE BLD-MCNC: 102 MG/DL (ref 70–99)
GLUCOSE BLD-MCNC: 109 MG/DL (ref 65–105)
GLUCOSE BLD-MCNC: 110 MG/DL (ref 70–99)
GLUCOSE BLD-MCNC: 113 MG/DL (ref 65–105)
GLUCOSE BLD-MCNC: 113 MG/DL (ref 70–99)
GLUCOSE BLD-MCNC: 118 MG/DL (ref 65–105)
GLUCOSE BLD-MCNC: 119 MG/DL (ref 65–105)
GLUCOSE BLD-MCNC: 123 MG/DL (ref 65–105)
GLUCOSE BLD-MCNC: 125 MG/DL (ref 65–105)
GLUCOSE BLD-MCNC: 127 MG/DL (ref 65–105)
GLUCOSE BLD-MCNC: 130 MG/DL (ref 65–105)
GLUCOSE BLD-MCNC: 132 MG/DL (ref 65–105)
GLUCOSE BLD-MCNC: 141 MG/DL (ref 65–105)
GLUCOSE BLD-MCNC: 142 MG/DL (ref 70–99)
GLUCOSE BLD-MCNC: 143 MG/DL (ref 70–99)
GLUCOSE BLD-MCNC: 144 MG/DL (ref 65–105)
GLUCOSE BLD-MCNC: 145 MG/DL (ref 65–105)
GLUCOSE BLD-MCNC: 152 MG/DL (ref 65–105)
GLUCOSE BLD-MCNC: 154 MG/DL (ref 65–105)
GLUCOSE BLD-MCNC: 154 MG/DL (ref 70–99)
GLUCOSE BLD-MCNC: 155 MG/DL (ref 65–105)
GLUCOSE BLD-MCNC: 155 MG/DL (ref 65–105)
GLUCOSE BLD-MCNC: 157 MG/DL (ref 65–105)
GLUCOSE BLD-MCNC: 159 MG/DL (ref 65–105)
GLUCOSE BLD-MCNC: 169 MG/DL (ref 65–105)
GLUCOSE BLD-MCNC: 170 MG/DL (ref 65–105)
GLUCOSE BLD-MCNC: 171 MG/DL (ref 65–105)
GLUCOSE BLD-MCNC: 173 MG/DL (ref 70–99)
GLUCOSE BLD-MCNC: 178 MG/DL (ref 65–105)
GLUCOSE BLD-MCNC: 179 MG/DL (ref 65–105)
GLUCOSE BLD-MCNC: 189 MG/DL (ref 65–105)
GLUCOSE BLD-MCNC: 194 MG/DL (ref 65–105)
GLUCOSE BLD-MCNC: 196 MG/DL (ref 65–105)
GLUCOSE BLD-MCNC: 197 MG/DL (ref 65–105)
GLUCOSE BLD-MCNC: 197 MG/DL (ref 65–105)
GLUCOSE BLD-MCNC: 198 MG/DL (ref 65–105)
GLUCOSE BLD-MCNC: 198 MG/DL (ref 70–99)
GLUCOSE BLD-MCNC: 199 MG/DL (ref 65–105)
GLUCOSE BLD-MCNC: 201 MG/DL (ref 65–105)
GLUCOSE BLD-MCNC: 202 MG/DL (ref 65–105)
GLUCOSE BLD-MCNC: 204 MG/DL (ref 65–105)
GLUCOSE BLD-MCNC: 207 MG/DL (ref 65–105)
GLUCOSE BLD-MCNC: 208 MG/DL (ref 70–99)
GLUCOSE BLD-MCNC: 209 MG/DL (ref 65–105)
GLUCOSE BLD-MCNC: 210 MG/DL (ref 65–105)
GLUCOSE BLD-MCNC: 219 MG/DL (ref 70–99)
GLUCOSE BLD-MCNC: 222 MG/DL (ref 65–105)
GLUCOSE BLD-MCNC: 235 MG/DL (ref 70–99)
GLUCOSE BLD-MCNC: 238 MG/DL (ref 65–105)
GLUCOSE BLD-MCNC: 245 MG/DL (ref 70–99)
GLUCOSE BLD-MCNC: 251 MG/DL (ref 70–99)
GLUCOSE BLD-MCNC: 283 MG/DL (ref 65–105)
GLUCOSE BLD-MCNC: 299 MG/DL
GLUCOSE BLD-MCNC: 314 MG/DL
GLUCOSE BLD-MCNC: 325 MG/DL
GLUCOSE BLD-MCNC: 54 MG/DL (ref 70–99)
GLUCOSE BLD-MCNC: 61 MG/DL (ref 70–99)
GLUCOSE BLD-MCNC: 75 MG/DL (ref 65–105)
GLUCOSE BLD-MCNC: 75 MG/DL (ref 65–105)
GLUCOSE BLD-MCNC: 77 MG/DL (ref 70–99)
GLUCOSE BLD-MCNC: 84 MG/DL (ref 65–105)
GLUCOSE BLD-MCNC: 85 MG/DL (ref 70–99)
GLUCOSE BLD-MCNC: 91 MG/DL (ref 70–99)
GLUCOSE BLD-MCNC: 92 MG/DL (ref 65–105)
GLUCOSE BLD-MCNC: 95 MG/DL (ref 65–105)
GLUCOSE BLD-MCNC: 96 MG/DL (ref 65–105)
GLUCOSE URINE: NEGATIVE
HBA1C MFR BLD: 6.1 % (ref 4–6)
HCT VFR BLD CALC: 33.5 % (ref 36–46)
HCT VFR BLD CALC: 33.6 % (ref 36–46)
HCT VFR BLD CALC: 34.4 % (ref 36–46)
HCT VFR BLD CALC: 35.7 % (ref 36–46)
HCT VFR BLD CALC: 36.9 % (ref 36–46)
HCT VFR BLD CALC: 37.1 % (ref 36.3–47.1)
HCT VFR BLD CALC: 37.5 % (ref 36–46)
HCT VFR BLD CALC: 37.9 % (ref 36.3–47.1)
HCT VFR BLD CALC: 38.5 % (ref 36.3–47.1)
HCT VFR BLD CALC: 39 % (ref 36–46)
HCT VFR BLD CALC: 40.6 % (ref 36–46)
HCT VFR BLD CALC: 43.5 % (ref 36–46)
HDLC SERPL-MCNC: 74 MG/DL
HEMOGLOBIN: 11.2 G/DL (ref 12–16)
HEMOGLOBIN: 11.5 G/DL (ref 12–16)
HEMOGLOBIN: 11.5 G/DL (ref 12–16)
HEMOGLOBIN: 12 G/DL (ref 11.9–15.1)
HEMOGLOBIN: 12 G/DL (ref 12–16)
HEMOGLOBIN: 12.1 G/DL (ref 12–16)
HEMOGLOBIN: 12.4 G/DL (ref 12–16)
HEMOGLOBIN: 12.7 G/DL (ref 11.9–15.1)
HEMOGLOBIN: 12.7 G/DL (ref 12–16)
HEMOGLOBIN: 12.8 G/DL (ref 11.9–15.1)
HEMOGLOBIN: 13.6 G/DL (ref 12–16)
HEMOGLOBIN: 14.9 G/DL (ref 12–16)
HISTONE ANTIBODY: 73 U/ML
IMMATURE GRANULOCYTES: 0 %
IMMATURE GRANULOCYTES: 0 %
IMMATURE GRANULOCYTES: ABNORMAL %
IMMATURE GRANULOCYTES: NORMAL %
INR BLD: 1.9
INR BLD: 1.9 (ref 0.9–1.2)
INR BLD: 2
INR BLD: 2.1
INR BLD: 2.1 (ref 0.9–1.2)
INR BLD: 2.2
INR BLD: 2.2
INR BLD: 2.2 (ref 0.9–1.2)
INR BLD: 2.3
INR BLD: 2.3
INR BLD: 2.4
INR BLD: 2.4
INR BLD: 2.5
INR BLD: 2.5 (ref 0.9–1.2)
INR BLD: 2.6
INR BLD: 2.7
INR BLD: 2.8
INR BLD: 2.9
INR BLD: 2.9 (ref 0.9–1.2)
INR BLD: 3
INR BLD: 3.2
INR BLD: 3.4
INR BLD: 3.6
KAPPA FREE LIGHT CHAINS QNT: 5.17 MG/DL (ref 0.37–1.94)
KETONES, URINE: NEGATIVE
LACTATE DEHYDROGENASE: 332 U/L (ref 135–214)
LACTIC ACID, WHOLE BLOOD: 1.3 MMOL/L (ref 0.7–2.1)
LAMBDA FREE LIGHT CHAINS QNT: 4.64 MG/DL (ref 0.57–2.63)
LDL CHOLESTEROL: 90 MG/DL (ref 0–130)
LEUKOCYTE ESTERASE, URINE: ABNORMAL
LIPASE: 156 U/L (ref 13–60)
LV EF: 30 %
LVEF MODALITY: NORMAL
LYMPHOCYTES # BLD: 14 % (ref 24–43)
LYMPHOCYTES # BLD: 17 % (ref 24–44)
LYMPHOCYTES # BLD: 20 % (ref 24–44)
LYMPHOCYTES # BLD: 26 % (ref 24–44)
LYMPHOCYTES # BLD: 34 % (ref 24–44)
LYMPHOCYTES # BLD: 36 % (ref 24–44)
LYMPHOCYTES # BLD: 7 % (ref 24–43)
Lab: ABNORMAL
Lab: NORMAL
MAGNESIUM: 1.7 MG/DL (ref 1.6–2.6)
MAGNESIUM: 1.8 MG/DL (ref 1.6–2.6)
MAGNESIUM: 1.9 MG/DL (ref 1.6–2.6)
MAGNESIUM: 2 MG/DL (ref 1.6–2.6)
MAGNESIUM: 2.2 MG/DL (ref 1.6–2.6)
MAGNESIUM: 2.2 MG/DL (ref 1.6–2.6)
MAGNESIUM: 2.3 MG/DL (ref 1.6–2.6)
MCH RBC QN AUTO: 32.7 PG (ref 26–34)
MCH RBC QN AUTO: 32.9 PG (ref 26–34)
MCH RBC QN AUTO: 33.1 PG (ref 25.2–33.5)
MCH RBC QN AUTO: 33.1 PG (ref 26–34)
MCH RBC QN AUTO: 33.2 PG (ref 25.2–33.5)
MCH RBC QN AUTO: 33.2 PG (ref 26–34)
MCH RBC QN AUTO: 33.3 PG (ref 25.2–33.5)
MCH RBC QN AUTO: 33.3 PG (ref 26–34)
MCH RBC QN AUTO: 33.5 PG (ref 26–34)
MCH RBC QN AUTO: 33.8 PG (ref 26–34)
MCH RBC QN AUTO: 33.9 PG (ref 26–34)
MCH RBC QN AUTO: 34 PG (ref 26–34)
MCHC RBC AUTO-ENTMCNC: 32.3 G/DL (ref 28.4–34.8)
MCHC RBC AUTO-ENTMCNC: 32.5 G/DL (ref 31–37)
MCHC RBC AUTO-ENTMCNC: 32.9 G/DL (ref 31–37)
MCHC RBC AUTO-ENTMCNC: 33 G/DL (ref 28.4–34.8)
MCHC RBC AUTO-ENTMCNC: 33.1 G/DL (ref 31–37)
MCHC RBC AUTO-ENTMCNC: 33.3 G/DL (ref 31–37)
MCHC RBC AUTO-ENTMCNC: 33.3 G/DL (ref 31–37)
MCHC RBC AUTO-ENTMCNC: 33.5 G/DL (ref 31–37)
MCHC RBC AUTO-ENTMCNC: 33.6 G/DL (ref 31–37)
MCHC RBC AUTO-ENTMCNC: 33.8 G/DL (ref 28.4–34.8)
MCHC RBC AUTO-ENTMCNC: 34.2 G/DL (ref 31–37)
MCHC RBC AUTO-ENTMCNC: 34.2 G/DL (ref 31–37)
MCV RBC AUTO: 100 FL (ref 80–100)
MCV RBC AUTO: 100 FL (ref 80–100)
MCV RBC AUTO: 100.5 FL (ref 80–100)
MCV RBC AUTO: 101 FL (ref 80–100)
MCV RBC AUTO: 101 FL (ref 82.6–102.9)
MCV RBC AUTO: 102.8 FL (ref 82.6–102.9)
MCV RBC AUTO: 97.9 FL (ref 82.6–102.9)
MCV RBC AUTO: 99.1 FL (ref 80–100)
MCV RBC AUTO: 99.3 FL (ref 80–100)
MCV RBC AUTO: 99.3 FL (ref 80–100)
MCV RBC AUTO: 99.6 FL (ref 80–100)
MCV RBC AUTO: 99.9 FL (ref 80–100)
MONOCYTES # BLD: 10 % (ref 1–7)
MONOCYTES # BLD: 6 % (ref 3–12)
MONOCYTES # BLD: 6 % (ref 3–12)
MONOCYTES # BLD: 7 % (ref 1–7)
MONOCYTES # BLD: 7 % (ref 1–7)
MONOCYTES # BLD: 8 % (ref 1–7)
MONOCYTES # BLD: 9 % (ref 1–7)
MUCUS: ABNORMAL
MUCUS: NORMAL
MUCUS: NORMAL
MYOGLOBIN: 21 NG/ML (ref 25–58)
MYOGLOBIN: 23 NG/ML (ref 25–58)
NITRITE, URINE: NEGATIVE
NITRITE, URINE: POSITIVE
NRBC AUTOMATED: 0 PER 100 WBC
NRBC AUTOMATED: ABNORMAL PER 100 WBC
NRBC AUTOMATED: NORMAL PER 100 WBC
OTHER OBSERVATIONS UA: ABNORMAL
OTHER OBSERVATIONS UA: NORMAL
OTHER OBSERVATIONS UA: NORMAL
PARTIAL THROMBOPLASTIN TIME: 29.6 SEC (ref 20.5–30.5)
PARTIAL THROMBOPLASTIN TIME: 36.8 SEC (ref 24–36)
PDW BLD-RTO: 12.1 % (ref 11.8–14.4)
PDW BLD-RTO: 12.3 % (ref 11.8–14.4)
PDW BLD-RTO: 12.4 % (ref 11.8–14.4)
PDW BLD-RTO: 13.7 % (ref 11.5–14.9)
PDW BLD-RTO: 14 % (ref 11.5–14.9)
PDW BLD-RTO: 14 % (ref 11.5–14.9)
PDW BLD-RTO: 14.2 % (ref 11.5–14.9)
PDW BLD-RTO: 14.3 % (ref 11.5–14.9)
PDW BLD-RTO: 14.3 % (ref 11.5–14.9)
PDW BLD-RTO: 14.7 % (ref 11.5–14.9)
PDW BLD-RTO: 14.8 % (ref 11.5–14.9)
PDW BLD-RTO: 15 % (ref 11.5–14.9)
PH UA: 6 (ref 5–8)
PH UA: 6 (ref 5–8)
PH UA: 6.5 (ref 5–8)
PH UA: 6.5 (ref 5–8)
PH UA: 7 (ref 5–8)
PH UA: 7 (ref 5–8)
PHOSPHORUS: 2.2 MG/DL (ref 2.6–4.5)
PHOSPHORUS: 2.6 MG/DL (ref 2.6–4.5)
PHOSPHORUS: 2.9 MG/DL (ref 2.6–4.5)
PHOSPHORUS: 3.6 MG/DL (ref 2.6–4.5)
PLATELET # BLD: 168 K/UL (ref 138–453)
PLATELET # BLD: 181 K/UL (ref 138–453)
PLATELET # BLD: 185 K/UL (ref 150–450)
PLATELET # BLD: 189 K/UL (ref 150–450)
PLATELET # BLD: 193 K/UL (ref 150–450)
PLATELET # BLD: 202 K/UL (ref 150–450)
PLATELET # BLD: 206 K/UL (ref 150–450)
PLATELET # BLD: 220 K/UL (ref 138–453)
PLATELET # BLD: 241 K/UL (ref 150–450)
PLATELET # BLD: 253 K/UL (ref 150–450)
PLATELET # BLD: 269 K/UL (ref 150–450)
PLATELET # BLD: 270 K/UL (ref 150–450)
PLATELET ESTIMATE: ABNORMAL
PLATELET ESTIMATE: NORMAL
PMV BLD AUTO: 10.1 FL (ref 8.1–13.5)
PMV BLD AUTO: 7.6 FL (ref 6–12)
PMV BLD AUTO: 7.7 FL (ref 6–12)
PMV BLD AUTO: 7.8 FL (ref 6–12)
PMV BLD AUTO: 7.8 FL (ref 6–12)
PMV BLD AUTO: 7.9 FL (ref 6–12)
PMV BLD AUTO: 7.9 FL (ref 6–12)
PMV BLD AUTO: 8 FL (ref 6–12)
PMV BLD AUTO: 8.1 FL (ref 6–12)
PMV BLD AUTO: 8.1 FL (ref 6–12)
PMV BLD AUTO: 9.7 FL (ref 8.1–13.5)
PMV BLD AUTO: 9.8 FL (ref 8.1–13.5)
POTASSIUM SERPL-SCNC: 3 MMOL/L
POTASSIUM SERPL-SCNC: 3.1 MMOL/L (ref 3.7–5.3)
POTASSIUM SERPL-SCNC: 3.3 MMOL/L
POTASSIUM SERPL-SCNC: 3.4 MMOL/L (ref 3.7–5.3)
POTASSIUM SERPL-SCNC: 3.5 MMOL/L
POTASSIUM SERPL-SCNC: 3.5 MMOL/L (ref 3.7–5.3)
POTASSIUM SERPL-SCNC: 3.6 MMOL/L (ref 3.7–5.3)
POTASSIUM SERPL-SCNC: 3.7 MMOL/L (ref 3.7–5.3)
POTASSIUM SERPL-SCNC: 3.8 MMOL/L (ref 3.7–5.3)
POTASSIUM SERPL-SCNC: 3.8 MMOL/L (ref 3.7–5.3)
POTASSIUM SERPL-SCNC: 3.9 MMOL/L (ref 3.7–5.3)
POTASSIUM SERPL-SCNC: 4 MMOL/L (ref 3.7–5.3)
POTASSIUM SERPL-SCNC: 4 MMOL/L (ref 3.7–5.3)
POTASSIUM SERPL-SCNC: 4.3 MMOL/L (ref 3.7–5.3)
POTASSIUM SERPL-SCNC: 4.5 MMOL/L (ref 3.7–5.3)
POTASSIUM SERPL-SCNC: 4.8 MMOL/L (ref 3.7–5.3)
POTASSIUM SERPL-SCNC: 5 MMOL/L (ref 3.7–5.3)
POTASSIUM SERPL-SCNC: 5.2 MMOL/L (ref 3.7–5.3)
PRO-BNP: 2659 PG/ML
PRO-BNP: 8239 PG/ML
PRO-BNP: ABNORMAL PG/ML
PROCALCITONIN: 0.08 NG/ML
PROTEIN UA: ABNORMAL
PROTEIN UA: NEGATIVE
PROTHROMBIN TIME: 21.2 SEC (ref 9.5–12.6)
PROTHROMBIN TIME: 22.4 SEC (ref 11.8–14.6)
PROTHROMBIN TIME: 23.1 SEC (ref 9–12)
PROTHROMBIN TIME: 23.4 SEC (ref 11.8–14.6)
PROTHROMBIN TIME: 23.5 SEC (ref 9–12)
PROTHROMBIN TIME: 24 SEC (ref 9.5–12.6)
PROTHROMBIN TIME: 24.9 SEC (ref 11.8–14.6)
PROTHROMBIN TIME: 25 SEC (ref 9.5–12.6)
PROTHROMBIN TIME: 25.1 SEC (ref 9–12)
PROTHROMBIN TIME: 25.3 SEC (ref 11.8–14.6)
PROTHROMBIN TIME: 26.4 SEC (ref 11.8–14.6)
PROTHROMBIN TIME: 27 SEC (ref 11.8–14.6)
PROTHROMBIN TIME: 28 SEC (ref 9.5–12.6)
PROTHROMBIN TIME: 28.2 SEC (ref 9–12)
PROTHROMBIN TIME: 28.4 SEC (ref 11.8–14.6)
PROTHROMBIN TIME: 28.7 SEC (ref 11.8–14.6)
PROTHROMBIN TIME: 29.3 SEC (ref 9–12)
PROTHROMBIN TIME: 29.7 SEC (ref 11.8–14.6)
PROTHROMBIN TIME: 30.5 SEC (ref 9–12)
PROTHROMBIN TIME: 32.6 SEC (ref 9.5–12.6)
PROTHROMBIN TIME: 34.5 SEC (ref 9–12)
PROTHROMBIN TIME: 34.6 SEC (ref 11.8–14.6)
PROTIME: 21.2 SECONDS
PROTIME: 25 SECONDS
PROTIME: 30.8 SECONDS
RBC # BLD: 3.35 M/UL (ref 4–5.2)
RBC # BLD: 3.37 M/UL (ref 4–5.2)
RBC # BLD: 3.45 M/UL (ref 4–5.2)
RBC # BLD: 3.58 M/UL (ref 4–5.2)
RBC # BLD: 3.61 M/UL (ref 3.95–5.11)
RBC # BLD: 3.72 M/UL (ref 4–5.2)
RBC # BLD: 3.75 M/UL (ref 4–5.2)
RBC # BLD: 3.81 M/UL (ref 3.95–5.11)
RBC # BLD: 3.86 M/UL (ref 4–5.2)
RBC # BLD: 3.87 M/UL (ref 3.95–5.11)
RBC # BLD: 4.03 M/UL (ref 4–5.2)
RBC # BLD: 4.38 M/UL (ref 4–5.2)
RBC # BLD: ABNORMAL 10*6/UL
RBC # BLD: NORMAL 10*6/UL
RBC UA: ABNORMAL /HPF
RBC UA: ABNORMAL /HPF (ref 0–2)
RBC UA: ABNORMAL /HPF (ref 0–2)
RBC UA: ABNORMAL /HPF (ref 0–4)
RBC UA: NORMAL /HPF (ref 0–4)
RBC UA: NORMAL /HPF (ref 0–4)
REASON FOR REJECTION: NORMAL
RENAL EPITHELIAL, UA: ABNORMAL /HPF
RENAL EPITHELIAL, UA: NORMAL /HPF
RENAL EPITHELIAL, UA: NORMAL /HPF
SEG NEUTROPHILS: 52 % (ref 36–66)
SEG NEUTROPHILS: 54 % (ref 36–66)
SEG NEUTROPHILS: 65 % (ref 36–66)
SEG NEUTROPHILS: 67 % (ref 36–66)
SEG NEUTROPHILS: 73 % (ref 36–66)
SEG NEUTROPHILS: 75 % (ref 36–65)
SEG NEUTROPHILS: 86 % (ref 36–65)
SEGMENTED NEUTROPHILS ABSOLUTE COUNT: 3 K/UL (ref 1.3–9.1)
SEGMENTED NEUTROPHILS ABSOLUTE COUNT: 3.1 K/UL (ref 1.3–9.1)
SEGMENTED NEUTROPHILS ABSOLUTE COUNT: 4.6 K/UL (ref 1.3–9.1)
SEGMENTED NEUTROPHILS ABSOLUTE COUNT: 4.8 K/UL (ref 1.3–9.1)
SEGMENTED NEUTROPHILS ABSOLUTE COUNT: 5.3 K/UL (ref 1.3–9.1)
SEGMENTED NEUTROPHILS ABSOLUTE COUNT: 7.51 K/UL (ref 1.5–8.1)
SEGMENTED NEUTROPHILS ABSOLUTE COUNT: 9.5 K/UL (ref 1.5–8.1)
SODIUM BLD-SCNC: 124 MMOL/L
SODIUM BLD-SCNC: 127 MMOL/L
SODIUM BLD-SCNC: 127 MMOL/L (ref 135–144)
SODIUM BLD-SCNC: 128 MMOL/L
SODIUM BLD-SCNC: 129 MMOL/L (ref 135–144)
SODIUM BLD-SCNC: 130 MMOL/L (ref 135–144)
SODIUM BLD-SCNC: 133 MMOL/L (ref 135–144)
SODIUM BLD-SCNC: 134 MMOL/L (ref 135–144)
SODIUM BLD-SCNC: 135 MMOL/L (ref 135–144)
SODIUM BLD-SCNC: 136 MMOL/L (ref 135–144)
SODIUM BLD-SCNC: 136 MMOL/L (ref 135–144)
SODIUM BLD-SCNC: 137 MMOL/L (ref 135–144)
SODIUM BLD-SCNC: 137 MMOL/L (ref 135–144)
SODIUM BLD-SCNC: 139 MMOL/L (ref 135–144)
SPECIFIC GRAVITY UA: 1.01 (ref 1–1.03)
SPECIMEN DESCRIPTION: ABNORMAL
SPECIMEN DESCRIPTION: NORMAL
THYROXINE, FREE: 1.56 NG/DL (ref 0.93–1.7)
TOTAL PROTEIN, URINE: 11 MG/DL
TOTAL PROTEIN: 6.7 G/DL (ref 6.4–8.3)
TOTAL PROTEIN: 7.4 G/DL (ref 6.4–8.3)
TOTAL PROTEIN: 7.6 G/DL (ref 6.4–8.3)
TOTAL PROTEIN: 7.8 G/DL (ref 6.4–8.3)
TOTAL PROTEIN: 8.2 G/DL (ref 6.4–8.3)
TOTAL PROTEIN: 8.5 G/DL (ref 6.4–8.3)
TRICHOMONAS: ABNORMAL
TRICHOMONAS: NORMAL
TRICHOMONAS: NORMAL
TRIGL SERPL-MCNC: 59 MG/DL
TROPONIN INTERP: ABNORMAL
TROPONIN INTERP: NORMAL
TROPONIN T: ABNORMAL NG/ML
TROPONIN T: NORMAL NG/ML
TROPONIN, HIGH SENSITIVITY: 12 NG/L (ref 0–14)
TROPONIN, HIGH SENSITIVITY: 13 NG/L (ref 0–14)
TROPONIN, HIGH SENSITIVITY: 15 NG/L (ref 0–14)
TROPONIN, HIGH SENSITIVITY: 15 NG/L (ref 0–14)
TROPONIN, HIGH SENSITIVITY: 16 NG/L (ref 0–14)
TROPONIN, HIGH SENSITIVITY: 16 NG/L (ref 0–14)
TROPONIN, HIGH SENSITIVITY: 17 NG/L (ref 0–14)
TROPONIN, HIGH SENSITIVITY: 19 NG/L (ref 0–14)
TROPONIN, HIGH SENSITIVITY: 20 NG/L (ref 0–14)
TROPONIN, HIGH SENSITIVITY: 21 NG/L (ref 0–14)
TSH SERPL DL<=0.05 MIU/L-ACNC: 14.09 MIU/L (ref 0.3–5)
TSH SERPL DL<=0.05 MIU/L-ACNC: 7.7 MIU/L (ref 0.3–5)
TURBIDITY: ABNORMAL
TURBIDITY: CLEAR
URINE HGB: ABNORMAL
URINE HGB: NEGATIVE
URINE TOTAL PROTEIN CREATININE RATIO: 0.15 (ref 0–0.2)
UROBILINOGEN, URINE: NORMAL
VLDLC SERPL CALC-MCNC: NORMAL MG/DL (ref 1–30)
WBC # BLD: 10.1 K/UL (ref 3.5–11.3)
WBC # BLD: 11.2 K/UL (ref 3.5–11.3)
WBC # BLD: 14.5 K/UL (ref 3.5–11.3)
WBC # BLD: 5.7 K/UL (ref 3.5–11)
WBC # BLD: 5.7 K/UL (ref 3.5–11)
WBC # BLD: 5.8 K/UL (ref 3.5–11)
WBC # BLD: 6.2 K/UL (ref 3.5–11)
WBC # BLD: 6.6 K/UL (ref 3.5–11)
WBC # BLD: 6.8 K/UL (ref 3.5–11)
WBC # BLD: 6.8 K/UL (ref 3.5–11)
WBC # BLD: 7.2 K/UL (ref 3.5–11)
WBC # BLD: 7.3 K/UL (ref 3.5–11)
WBC # BLD: ABNORMAL 10*3/UL
WBC # BLD: NORMAL 10*3/UL
WBC UA: ABNORMAL /HPF
WBC UA: ABNORMAL /HPF (ref 0–5)
WBC UA: NORMAL /HPF (ref 0–5)
WBC UA: NORMAL /HPF (ref 0–5)
YEAST: ABNORMAL
YEAST: NORMAL
YEAST: NORMAL
ZZ NTE CLEAN UP: ORDERED TEST: NORMAL
ZZ NTE WITH NAME CLEAN UP: SPECIMEN SOURCE: NORMAL

## 2020-01-01 PROCEDURE — 80053 COMPREHEN METABOLIC PANEL: CPT

## 2020-01-01 PROCEDURE — 96374 THER/PROPH/DIAG INJ IV PUSH: CPT

## 2020-01-01 PROCEDURE — 4040F PNEUMOC VAC/ADMIN/RCVD: CPT | Performed by: INTERNAL MEDICINE

## 2020-01-01 PROCEDURE — 99223 1ST HOSP IP/OBS HIGH 75: CPT | Performed by: INTERNAL MEDICINE

## 2020-01-01 PROCEDURE — 6360000002 HC RX W HCPCS: Performed by: EMERGENCY MEDICINE

## 2020-01-01 PROCEDURE — 93005 ELECTROCARDIOGRAM TRACING: CPT | Performed by: STUDENT IN AN ORGANIZED HEALTH CARE EDUCATION/TRAINING PROGRAM

## 2020-01-01 PROCEDURE — 1090F PRES/ABSN URINE INCON ASSESS: CPT | Performed by: INTERNAL MEDICINE

## 2020-01-01 PROCEDURE — 83615 LACTATE (LD) (LDH) ENZYME: CPT

## 2020-01-01 PROCEDURE — 1036F TOBACCO NON-USER: CPT | Performed by: PHYSICIAN ASSISTANT

## 2020-01-01 PROCEDURE — 36415 COLL VENOUS BLD VENIPUNCTURE: CPT

## 2020-01-01 PROCEDURE — 85610 PROTHROMBIN TIME: CPT

## 2020-01-01 PROCEDURE — 2580000003 HC RX 258: Performed by: NURSE PRACTITIONER

## 2020-01-01 PROCEDURE — 2580000003 HC RX 258: Performed by: STUDENT IN AN ORGANIZED HEALTH CARE EDUCATION/TRAINING PROGRAM

## 2020-01-01 PROCEDURE — 99232 SBSQ HOSP IP/OBS MODERATE 35: CPT | Performed by: INTERNAL MEDICINE

## 2020-01-01 PROCEDURE — 99239 HOSP IP/OBS DSCHRG MGMT >30: CPT | Performed by: INTERNAL MEDICINE

## 2020-01-01 PROCEDURE — 82947 ASSAY GLUCOSE BLOOD QUANT: CPT

## 2020-01-01 PROCEDURE — 99214 OFFICE O/P EST MOD 30 MIN: CPT | Performed by: INTERNAL MEDICINE

## 2020-01-01 PROCEDURE — 71046 X-RAY EXAM CHEST 2 VIEWS: CPT

## 2020-01-01 PROCEDURE — 1123F ACP DISCUSS/DSCN MKR DOCD: CPT | Performed by: INTERNAL MEDICINE

## 2020-01-01 PROCEDURE — 6370000000 HC RX 637 (ALT 250 FOR IP): Performed by: STUDENT IN AN ORGANIZED HEALTH CARE EDUCATION/TRAINING PROGRAM

## 2020-01-01 PROCEDURE — 6370000000 HC RX 637 (ALT 250 FOR IP): Performed by: INTERNAL MEDICINE

## 2020-01-01 PROCEDURE — 80069 RENAL FUNCTION PANEL: CPT

## 2020-01-01 PROCEDURE — 85025 COMPLETE CBC W/AUTO DIFF WBC: CPT

## 2020-01-01 PROCEDURE — 83605 ASSAY OF LACTIC ACID: CPT

## 2020-01-01 PROCEDURE — G8428 CUR MEDS NOT DOCUMENT: HCPCS | Performed by: INTERNAL MEDICINE

## 2020-01-01 PROCEDURE — 99285 EMERGENCY DEPT VISIT HI MDM: CPT

## 2020-01-01 PROCEDURE — 1111F DSCHRG MED/CURRENT MED MERGE: CPT

## 2020-01-01 PROCEDURE — 2580000003 HC RX 258: Performed by: FAMILY MEDICINE

## 2020-01-01 PROCEDURE — 97165 OT EVAL LOW COMPLEX 30 MIN: CPT

## 2020-01-01 PROCEDURE — 6370000000 HC RX 637 (ALT 250 FOR IP): Performed by: NURSE PRACTITIONER

## 2020-01-01 PROCEDURE — 93005 ELECTROCARDIOGRAM TRACING: CPT | Performed by: EMERGENCY MEDICINE

## 2020-01-01 PROCEDURE — 99284 EMERGENCY DEPT VISIT MOD MDM: CPT

## 2020-01-01 PROCEDURE — 97161 PT EVAL LOW COMPLEX 20 MIN: CPT

## 2020-01-01 PROCEDURE — 6360000002 HC RX W HCPCS: Performed by: STUDENT IN AN ORGANIZED HEALTH CARE EDUCATION/TRAINING PROGRAM

## 2020-01-01 PROCEDURE — 1123F ACP DISCUSS/DSCN MKR DOCD: CPT | Performed by: PHYSICIAN ASSISTANT

## 2020-01-01 PROCEDURE — 2060000000 HC ICU INTERMEDIATE R&B

## 2020-01-01 PROCEDURE — G8399 PT W/DXA RESULTS DOCUMENT: HCPCS | Performed by: INTERNAL MEDICINE

## 2020-01-01 PROCEDURE — 80048 BASIC METABOLIC PNL TOTAL CA: CPT

## 2020-01-01 PROCEDURE — G8417 CALC BMI ABV UP PARAM F/U: HCPCS | Performed by: INTERNAL MEDICINE

## 2020-01-01 PROCEDURE — 6370000000 HC RX 637 (ALT 250 FOR IP): Performed by: EMERGENCY MEDICINE

## 2020-01-01 PROCEDURE — 85027 COMPLETE CBC AUTOMATED: CPT

## 2020-01-01 PROCEDURE — 1111F DSCHRG MED/CURRENT MED MERGE: CPT | Performed by: PHYSICIAN ASSISTANT

## 2020-01-01 PROCEDURE — 83735 ASSAY OF MAGNESIUM: CPT

## 2020-01-01 PROCEDURE — 2580000003 HC RX 258: Performed by: INTERNAL MEDICINE

## 2020-01-01 PROCEDURE — 97110 THERAPEUTIC EXERCISES: CPT

## 2020-01-01 PROCEDURE — G8399 PT W/DXA RESULTS DOCUMENT: HCPCS | Performed by: PHYSICIAN ASSISTANT

## 2020-01-01 PROCEDURE — G8417 CALC BMI ABV UP PARAM F/U: HCPCS | Performed by: PHYSICIAN ASSISTANT

## 2020-01-01 PROCEDURE — 99239 HOSP IP/OBS DSCHRG MGMT >30: CPT | Performed by: FAMILY MEDICINE

## 2020-01-01 PROCEDURE — 87086 URINE CULTURE/COLONY COUNT: CPT

## 2020-01-01 PROCEDURE — 84484 ASSAY OF TROPONIN QUANT: CPT

## 2020-01-01 PROCEDURE — 93010 ELECTROCARDIOGRAM REPORT: CPT | Performed by: INTERNAL MEDICINE

## 2020-01-01 PROCEDURE — 97116 GAIT TRAINING THERAPY: CPT

## 2020-01-01 PROCEDURE — 99233 SBSQ HOSP IP/OBS HIGH 50: CPT | Performed by: INTERNAL MEDICINE

## 2020-01-01 PROCEDURE — 99214 OFFICE O/P EST MOD 30 MIN: CPT | Performed by: PHYSICIAN ASSISTANT

## 2020-01-01 PROCEDURE — 6360000002 HC RX W HCPCS: Performed by: NURSE PRACTITIONER

## 2020-01-01 PROCEDURE — 83880 ASSAY OF NATRIURETIC PEPTIDE: CPT

## 2020-01-01 PROCEDURE — 74176 CT ABD & PELVIS W/O CONTRAST: CPT

## 2020-01-01 PROCEDURE — 86140 C-REACTIVE PROTEIN: CPT

## 2020-01-01 PROCEDURE — 93005 ELECTROCARDIOGRAM TRACING: CPT | Performed by: INTERNAL MEDICINE

## 2020-01-01 PROCEDURE — 1036F TOBACCO NON-USER: CPT | Performed by: INTERNAL MEDICINE

## 2020-01-01 PROCEDURE — G8482 FLU IMMUNIZE ORDER/ADMIN: HCPCS | Performed by: INTERNAL MEDICINE

## 2020-01-01 PROCEDURE — 84443 ASSAY THYROID STIM HORMONE: CPT

## 2020-01-01 PROCEDURE — 93005 ELECTROCARDIOGRAM TRACING: CPT | Performed by: NURSE PRACTITIONER

## 2020-01-01 PROCEDURE — 84439 ASSAY OF FREE THYROXINE: CPT

## 2020-01-01 PROCEDURE — 4040F PNEUMOC VAC/ADMIN/RCVD: CPT | Performed by: PHYSICIAN ASSISTANT

## 2020-01-01 PROCEDURE — G8427 DOCREV CUR MEDS BY ELIG CLIN: HCPCS | Performed by: INTERNAL MEDICINE

## 2020-01-01 PROCEDURE — 96376 TX/PRO/DX INJ SAME DRUG ADON: CPT

## 2020-01-01 PROCEDURE — 97162 PT EVAL MOD COMPLEX 30 MIN: CPT

## 2020-01-01 PROCEDURE — 99495 TRANSJ CARE MGMT MOD F2F 14D: CPT | Performed by: PHYSICIAN ASSISTANT

## 2020-01-01 PROCEDURE — 84145 PROCALCITONIN (PCT): CPT

## 2020-01-01 PROCEDURE — 74022 RADEX COMPL AQT ABD SERIES: CPT

## 2020-01-01 PROCEDURE — 80061 LIPID PANEL: CPT

## 2020-01-01 PROCEDURE — 99215 OFFICE O/P EST HI 40 MIN: CPT | Performed by: INTERNAL MEDICINE

## 2020-01-01 PROCEDURE — 83036 HEMOGLOBIN GLYCOSYLATED A1C: CPT

## 2020-01-01 PROCEDURE — 83690 ASSAY OF LIPASE: CPT

## 2020-01-01 PROCEDURE — APPSS60 APP SPLIT SHARED TIME 46-60 MINUTES: Performed by: NURSE PRACTITIONER

## 2020-01-01 PROCEDURE — 71045 X-RAY EXAM CHEST 1 VIEW: CPT

## 2020-01-01 PROCEDURE — 1111F DSCHRG MED/CURRENT MED MERGE: CPT | Performed by: INTERNAL MEDICINE

## 2020-01-01 PROCEDURE — 81001 URINALYSIS AUTO W/SCOPE: CPT

## 2020-01-01 PROCEDURE — 99232 SBSQ HOSP IP/OBS MODERATE 35: CPT | Performed by: FAMILY MEDICINE

## 2020-01-01 PROCEDURE — 93306 TTE W/DOPPLER COMPLETE: CPT

## 2020-01-01 PROCEDURE — 99283 EMERGENCY DEPT VISIT LOW MDM: CPT

## 2020-01-01 PROCEDURE — 1090F PRES/ABSN URINE INCON ASSESS: CPT | Performed by: PHYSICIAN ASSISTANT

## 2020-01-01 PROCEDURE — 85730 THROMBOPLASTIN TIME PARTIAL: CPT

## 2020-01-01 PROCEDURE — 83874 ASSAY OF MYOGLOBIN: CPT

## 2020-01-01 PROCEDURE — G8427 DOCREV CUR MEDS BY ELIG CLIN: HCPCS | Performed by: PHYSICIAN ASSISTANT

## 2020-01-01 RX ORDER — SPIRONOLACTONE 25 MG/1
25 TABLET ORAL DAILY
Status: DISCONTINUED | OUTPATIENT
Start: 2020-01-01 | End: 2020-01-01 | Stop reason: HOSPADM

## 2020-01-01 RX ORDER — WARFARIN SODIUM 2 MG/1
2 TABLET ORAL
Status: COMPLETED | OUTPATIENT
Start: 2020-01-01 | End: 2020-01-01

## 2020-01-01 RX ORDER — LORAZEPAM 0.5 MG/1
0.5 TABLET ORAL NIGHTLY
Qty: 30 TABLET | Refills: 0 | Status: SHIPPED | OUTPATIENT
Start: 2020-01-01 | End: 2020-01-01 | Stop reason: SDUPTHER

## 2020-01-01 RX ORDER — LORAZEPAM 2 MG/ML
0.5 INJECTION INTRAMUSCULAR ONCE
Status: COMPLETED | OUTPATIENT
Start: 2020-01-01 | End: 2020-01-01

## 2020-01-01 RX ORDER — METOLAZONE 2.5 MG/1
2.5 TABLET ORAL DAILY
Qty: 30 TABLET | Refills: 3 | Status: CANCELLED | OUTPATIENT
Start: 2020-01-01

## 2020-01-01 RX ORDER — SODIUM CHLORIDE 0.9 % (FLUSH) 0.9 %
10 SYRINGE (ML) INJECTION PRN
Status: DISCONTINUED | OUTPATIENT
Start: 2020-01-01 | End: 2020-01-01 | Stop reason: HOSPADM

## 2020-01-01 RX ORDER — FUROSEMIDE 40 MG/1
40 TABLET ORAL 2 TIMES DAILY
Status: ON HOLD | COMMUNITY
End: 2020-01-01 | Stop reason: SDUPTHER

## 2020-01-01 RX ORDER — POTASSIUM CHLORIDE 20 MEQ/1
20 TABLET, EXTENDED RELEASE ORAL ONCE
Status: COMPLETED | OUTPATIENT
Start: 2020-01-01 | End: 2020-01-01

## 2020-01-01 RX ORDER — POTASSIUM CHLORIDE 20MEQ/15ML
20 LIQUID (ML) ORAL 2 TIMES DAILY
Qty: 450 ML | Refills: 0 | Status: SHIPPED | OUTPATIENT
Start: 2020-01-01 | End: 2020-01-01

## 2020-01-01 RX ORDER — POTASSIUM CHLORIDE 20 MEQ/1
40 TABLET, EXTENDED RELEASE ORAL PRN
Status: DISCONTINUED | OUTPATIENT
Start: 2020-01-01 | End: 2020-01-01 | Stop reason: HOSPADM

## 2020-01-01 RX ORDER — POTASSIUM CHLORIDE 20MEQ/15ML
LIQUID (ML) ORAL
Qty: 450 ML | Refills: 0 | Status: SHIPPED | OUTPATIENT
Start: 2020-01-01

## 2020-01-01 RX ORDER — LORAZEPAM 0.5 MG/1
0.5 TABLET ORAL ONCE
Status: COMPLETED | OUTPATIENT
Start: 2020-01-01 | End: 2020-01-01

## 2020-01-01 RX ORDER — FUROSEMIDE 80 MG
40 TABLET ORAL 2 TIMES DAILY
Qty: 60 TABLET | Refills: 5 | Status: SHIPPED | OUTPATIENT
Start: 2020-01-01 | End: 2020-01-01

## 2020-01-01 RX ORDER — SOTALOL HYDROCHLORIDE 80 MG/1
80 TABLET ORAL 2 TIMES DAILY
Status: ON HOLD | COMMUNITY
End: 2020-01-01 | Stop reason: HOSPADM

## 2020-01-01 RX ORDER — ASPIRIN 81 MG/1
81 TABLET ORAL EVERY MORNING
Status: DISCONTINUED | OUTPATIENT
Start: 2020-01-01 | End: 2020-01-01 | Stop reason: HOSPADM

## 2020-01-01 RX ORDER — LORAZEPAM 0.5 MG/1
0.5 TABLET ORAL 3 TIMES DAILY PRN
Qty: 180 TABLET | Refills: 0 | Status: SHIPPED | OUTPATIENT
Start: 2020-01-01 | End: 2020-10-03

## 2020-01-01 RX ORDER — SODIUM CHLORIDE 0.9 % (FLUSH) 0.9 %
10 SYRINGE (ML) INJECTION PRN
Status: DISCONTINUED | OUTPATIENT
Start: 2020-01-01 | End: 2020-01-01

## 2020-01-01 RX ORDER — FUROSEMIDE 10 MG/ML
40 INJECTION INTRAMUSCULAR; INTRAVENOUS 2 TIMES DAILY
Status: DISCONTINUED | OUTPATIENT
Start: 2020-01-01 | End: 2020-01-01

## 2020-01-01 RX ORDER — MAGNESIUM SULFATE 1 G/100ML
1 INJECTION INTRAVENOUS PRN
Status: DISCONTINUED | OUTPATIENT
Start: 2020-01-01 | End: 2020-01-01 | Stop reason: HOSPADM

## 2020-01-01 RX ORDER — FUROSEMIDE 10 MG/ML
20 INJECTION INTRAMUSCULAR; INTRAVENOUS ONCE
Status: COMPLETED | OUTPATIENT
Start: 2020-01-01 | End: 2020-01-01

## 2020-01-01 RX ORDER — POLYETHYLENE GLYCOL 3350 17 G/17G
17 POWDER, FOR SOLUTION ORAL DAILY PRN
Status: DISCONTINUED | OUTPATIENT
Start: 2020-01-01 | End: 2020-01-01 | Stop reason: HOSPADM

## 2020-01-01 RX ORDER — LORAZEPAM 0.5 MG/1
0.5 TABLET ORAL NIGHTLY
Qty: 30 TABLET | Refills: 0 | Status: ON HOLD | OUTPATIENT
Start: 2020-01-01 | End: 2020-01-01 | Stop reason: SDUPTHER

## 2020-01-01 RX ORDER — METOPROLOL SUCCINATE 25 MG/1
25 TABLET, EXTENDED RELEASE ORAL 2 TIMES DAILY
Qty: 30 TABLET | Refills: 3 | Status: SHIPPED | OUTPATIENT
Start: 2020-01-01 | End: 2020-01-01

## 2020-01-01 RX ORDER — POTASSIUM CHLORIDE 7.45 MG/ML
10 INJECTION INTRAVENOUS PRN
Status: DISCONTINUED | OUTPATIENT
Start: 2020-01-01 | End: 2020-01-01 | Stop reason: HOSPADM

## 2020-01-01 RX ORDER — SODIUM CHLORIDE 0.9 % (FLUSH) 0.9 %
10 SYRINGE (ML) INJECTION EVERY 12 HOURS SCHEDULED
Status: DISCONTINUED | OUTPATIENT
Start: 2020-01-01 | End: 2020-01-01 | Stop reason: HOSPADM

## 2020-01-01 RX ORDER — DEXTROSE MONOHYDRATE 50 MG/ML
100 INJECTION, SOLUTION INTRAVENOUS PRN
Status: DISCONTINUED | OUTPATIENT
Start: 2020-01-01 | End: 2020-01-01 | Stop reason: HOSPADM

## 2020-01-01 RX ORDER — DEXTROSE MONOHYDRATE 25 G/50ML
12.5 INJECTION, SOLUTION INTRAVENOUS PRN
Status: DISCONTINUED | OUTPATIENT
Start: 2020-01-01 | End: 2020-01-01 | Stop reason: HOSPADM

## 2020-01-01 RX ORDER — POTASSIUM CHLORIDE 20MEQ/15ML
LIQUID (ML) ORAL
Qty: 450 ML | Refills: 0 | Status: SHIPPED | OUTPATIENT
Start: 2020-01-01 | End: 2020-01-01

## 2020-01-01 RX ORDER — MIDODRINE HYDROCHLORIDE 5 MG/1
5 TABLET ORAL 3 TIMES DAILY PRN
Qty: 90 TABLET | Refills: 0 | Status: SHIPPED | OUTPATIENT
Start: 2020-01-01 | End: 2020-01-01 | Stop reason: SDUPTHER

## 2020-01-01 RX ORDER — POTASSIUM CHLORIDE 7.45 MG/ML
10 INJECTION INTRAVENOUS PRN
Status: DISCONTINUED | OUTPATIENT
Start: 2020-01-01 | End: 2020-01-01 | Stop reason: SDUPTHER

## 2020-01-01 RX ORDER — FUROSEMIDE 80 MG
TABLET ORAL
Qty: 90 TABLET | Refills: 0 | Status: SHIPPED | OUTPATIENT
Start: 2020-01-01

## 2020-01-01 RX ORDER — CEFUROXIME AXETIL 250 MG/1
250 TABLET ORAL 2 TIMES DAILY
COMMUNITY
Start: 2020-01-01 | End: 2020-01-01 | Stop reason: ALTCHOICE

## 2020-01-01 RX ORDER — CEFUROXIME AXETIL 250 MG/1
TABLET ORAL
COMMUNITY
Start: 2020-01-01 | End: 2020-01-01 | Stop reason: SDUPTHER

## 2020-01-01 RX ORDER — SODIUM BICARBONATE 650 MG/1
650 TABLET ORAL 2 TIMES DAILY
Qty: 60 TABLET | Refills: 3 | Status: SHIPPED | OUTPATIENT
Start: 2020-01-01 | End: 2021-06-10

## 2020-01-01 RX ORDER — NICOTINE POLACRILEX 4 MG
15 LOZENGE BUCCAL PRN
Status: DISCONTINUED | OUTPATIENT
Start: 2020-01-01 | End: 2020-01-01 | Stop reason: HOSPADM

## 2020-01-01 RX ORDER — LORAZEPAM 0.5 MG/1
0.5 TABLET ORAL NIGHTLY
Status: DISCONTINUED | OUTPATIENT
Start: 2020-01-01 | End: 2020-01-01 | Stop reason: HOSPADM

## 2020-01-01 RX ORDER — COLCHICINE 0.6 MG/1
0.6 TABLET ORAL PRN
COMMUNITY
Start: 2019-01-01 | End: 2020-01-01 | Stop reason: ALTCHOICE

## 2020-01-01 RX ORDER — NITROGLYCERIN 0.4 MG/1
0.4 TABLET SUBLINGUAL EVERY 5 MIN PRN
Qty: 25 TABLET | Refills: 2 | Status: SHIPPED | OUTPATIENT
Start: 2020-01-01

## 2020-01-01 RX ORDER — LEVOTHYROXINE SODIUM 0.05 MG/1
50 TABLET ORAL DAILY
Status: DISCONTINUED | OUTPATIENT
Start: 2020-01-01 | End: 2020-01-01 | Stop reason: HOSPADM

## 2020-01-01 RX ORDER — FUROSEMIDE 10 MG/ML
40 INJECTION INTRAMUSCULAR; INTRAVENOUS 2 TIMES DAILY
Status: DISCONTINUED | OUTPATIENT
Start: 2020-01-01 | End: 2020-01-01 | Stop reason: HOSPADM

## 2020-01-01 RX ORDER — CITALOPRAM 10 MG/1
10 TABLET ORAL DAILY
Qty: 30 TABLET | Refills: 2 | Status: SHIPPED | OUTPATIENT
Start: 2020-01-01

## 2020-01-01 RX ORDER — FUROSEMIDE 40 MG/1
40 TABLET ORAL DAILY
Status: DISCONTINUED | OUTPATIENT
Start: 2020-01-01 | End: 2020-01-01 | Stop reason: HOSPADM

## 2020-01-01 RX ORDER — LINEZOLID 600 MG/1
600 TABLET, FILM COATED ORAL 2 TIMES DAILY
Qty: 14 TABLET | Refills: 0 | Status: SHIPPED | OUTPATIENT
Start: 2020-01-01 | End: 2020-01-01 | Stop reason: ALTCHOICE

## 2020-01-01 RX ORDER — POTASSIUM CHLORIDE 20 MEQ/1
20 TABLET, EXTENDED RELEASE ORAL 2 TIMES DAILY
Status: ON HOLD | COMMUNITY
Start: 2020-01-01 | End: 2020-01-01 | Stop reason: HOSPADM

## 2020-01-01 RX ORDER — ACETAMINOPHEN 650 MG/1
650 SUPPOSITORY RECTAL EVERY 6 HOURS PRN
Status: DISCONTINUED | OUTPATIENT
Start: 2020-01-01 | End: 2020-01-01 | Stop reason: HOSPADM

## 2020-01-01 RX ORDER — METOLAZONE 2.5 MG/1
2.5 TABLET ORAL DAILY
Status: DISCONTINUED | OUTPATIENT
Start: 2020-01-01 | End: 2020-01-01 | Stop reason: HOSPADM

## 2020-01-01 RX ORDER — MAGNESIUM SULFATE 1 G/100ML
1 INJECTION INTRAVENOUS
COMMUNITY
Start: 2020-01-01 | End: 2020-01-01

## 2020-01-01 RX ORDER — COLCHICINE 0.6 MG/1
0.6 TABLET, FILM COATED ORAL DAILY
Qty: 15 TABLET | Refills: 0 | Status: ON HOLD | OUTPATIENT
Start: 2020-01-01 | End: 2020-01-01

## 2020-01-01 RX ORDER — MAGNESIUM SULFATE 1 G/100ML
1 INJECTION INTRAVENOUS ONCE
Status: COMPLETED | OUTPATIENT
Start: 2020-01-01 | End: 2020-01-01

## 2020-01-01 RX ORDER — NITROFURANTOIN 25; 75 MG/1; MG/1
100 CAPSULE ORAL 2 TIMES DAILY
Qty: 20 CAPSULE | Refills: 0 | Status: SHIPPED | OUTPATIENT
Start: 2020-01-01 | End: 2020-01-01

## 2020-01-01 RX ORDER — POTASSIUM CHLORIDE 20 MEQ/1
40 TABLET, EXTENDED RELEASE ORAL ONCE
Status: COMPLETED | OUTPATIENT
Start: 2020-01-01 | End: 2020-01-01

## 2020-01-01 RX ORDER — AMIODARONE HYDROCHLORIDE 200 MG/1
200 TABLET ORAL DAILY
Status: DISCONTINUED | OUTPATIENT
Start: 2020-01-01 | End: 2020-01-01

## 2020-01-01 RX ORDER — DOXYCYCLINE HYCLATE 100 MG
100 TABLET ORAL 2 TIMES DAILY
Qty: 14 TABLET | Refills: 0 | Status: SHIPPED | OUTPATIENT
Start: 2020-01-01 | End: 2020-01-01

## 2020-01-01 RX ORDER — DOCUSATE SODIUM 100 MG/1
100 CAPSULE, LIQUID FILLED ORAL EVERY MORNING
Status: DISCONTINUED | OUTPATIENT
Start: 2020-01-01 | End: 2020-01-01 | Stop reason: HOSPADM

## 2020-01-01 RX ORDER — AMIODARONE HYDROCHLORIDE 200 MG/1
200 TABLET ORAL 2 TIMES DAILY
Qty: 30 TABLET | Refills: 3 | Status: SHIPPED | OUTPATIENT
Start: 2020-01-01 | End: 2020-01-01 | Stop reason: ALTCHOICE

## 2020-01-01 RX ORDER — 0.9 % SODIUM CHLORIDE 0.9 %
500 INTRAVENOUS SOLUTION INTRAVENOUS ONCE
Status: COMPLETED | OUTPATIENT
Start: 2020-01-01 | End: 2020-01-01

## 2020-01-01 RX ORDER — POLYETHYLENE GLYCOL 3350 17 G/17G
17 POWDER, FOR SOLUTION ORAL
COMMUNITY
Start: 2020-01-01 | End: 2020-01-01

## 2020-01-01 RX ORDER — FUROSEMIDE 40 MG/1
40 TABLET ORAL 2 TIMES DAILY
Status: DISCONTINUED | OUTPATIENT
Start: 2020-01-01 | End: 2020-01-01 | Stop reason: HOSPADM

## 2020-01-01 RX ORDER — WARFARIN SODIUM 2 MG/1
2 TABLET ORAL ONCE
Status: COMPLETED | OUTPATIENT
Start: 2020-01-01 | End: 2020-01-01

## 2020-01-01 RX ORDER — MIDODRINE HYDROCHLORIDE 5 MG/1
TABLET ORAL
Qty: 90 TABLET | Refills: 11 | OUTPATIENT
Start: 2020-01-01

## 2020-01-01 RX ORDER — SOTALOL HYDROCHLORIDE 80 MG/1
80 TABLET ORAL 2 TIMES DAILY
Status: DISCONTINUED | OUTPATIENT
Start: 2020-01-01 | End: 2020-01-01 | Stop reason: HOSPADM

## 2020-01-01 RX ORDER — POTASSIUM CHLORIDE 20 MEQ/1
20 TABLET, EXTENDED RELEASE ORAL 2 TIMES DAILY WITH MEALS
Status: DISCONTINUED | OUTPATIENT
Start: 2020-01-01 | End: 2020-01-01 | Stop reason: HOSPADM

## 2020-01-01 RX ORDER — SODIUM CHLORIDE 0.9 % (FLUSH) 0.9 %
10 SYRINGE (ML) INJECTION EVERY 12 HOURS SCHEDULED
Status: DISCONTINUED | OUTPATIENT
Start: 2020-01-01 | End: 2020-01-01

## 2020-01-01 RX ORDER — MIDODRINE HYDROCHLORIDE 5 MG/1
5 TABLET ORAL 3 TIMES DAILY PRN
Status: DISCONTINUED | OUTPATIENT
Start: 2020-01-01 | End: 2020-01-01 | Stop reason: HOSPADM

## 2020-01-01 RX ORDER — WARFARIN SODIUM 1 MG/1
2 TABLET ORAL
COMMUNITY

## 2020-01-01 RX ORDER — FUROSEMIDE 40 MG/1
40 TABLET ORAL 2 TIMES DAILY
Status: CANCELLED | OUTPATIENT
Start: 2020-01-01

## 2020-01-01 RX ORDER — SODIUM CHLORIDE 9 MG/ML
INJECTION, SOLUTION INTRAVENOUS CONTINUOUS
Status: DISCONTINUED | OUTPATIENT
Start: 2020-01-01 | End: 2020-01-01 | Stop reason: HOSPADM

## 2020-01-01 RX ORDER — LORAZEPAM 0.5 MG/1
0.5 TABLET ORAL 3 TIMES DAILY PRN
Qty: 180 TABLET | Refills: 0 | Status: SHIPPED | OUTPATIENT
Start: 2020-01-01 | End: 2020-01-01 | Stop reason: SDUPTHER

## 2020-01-01 RX ORDER — FUROSEMIDE 40 MG/1
40 TABLET ORAL 2 TIMES DAILY
Status: DISCONTINUED | OUTPATIENT
Start: 2020-01-01 | End: 2020-01-01

## 2020-01-01 RX ORDER — FUROSEMIDE 10 MG/ML
40 INJECTION INTRAMUSCULAR; INTRAVENOUS ONCE
Status: COMPLETED | OUTPATIENT
Start: 2020-01-01 | End: 2020-01-01

## 2020-01-01 RX ORDER — LORAZEPAM 0.5 MG/1
0.5 TABLET ORAL NIGHTLY
Qty: 30 TABLET | Refills: 0 | Status: CANCELLED | OUTPATIENT
Start: 2020-01-01 | End: 2020-01-01

## 2020-01-01 RX ORDER — WARFARIN SODIUM 1 MG/1
1 TABLET ORAL
Status: DISCONTINUED | OUTPATIENT
Start: 2020-01-01 | End: 2020-01-01 | Stop reason: HOSPADM

## 2020-01-01 RX ORDER — ACETAMINOPHEN 500 MG
500 TABLET ORAL 4 TIMES DAILY PRN
Qty: 120 TABLET | Refills: 3 | Status: SHIPPED | OUTPATIENT
Start: 2020-01-01

## 2020-01-01 RX ORDER — AMIODARONE HYDROCHLORIDE 200 MG/1
200 TABLET ORAL 2 TIMES DAILY
Status: DISCONTINUED | OUTPATIENT
Start: 2020-01-01 | End: 2020-01-01 | Stop reason: HOSPADM

## 2020-01-01 RX ORDER — ACETAMINOPHEN 325 MG/1
650 TABLET ORAL EVERY 4 HOURS PRN
Status: DISCONTINUED | OUTPATIENT
Start: 2020-01-01 | End: 2020-01-01 | Stop reason: HOSPADM

## 2020-01-01 RX ORDER — ONDANSETRON 2 MG/ML
4 INJECTION INTRAMUSCULAR; INTRAVENOUS ONCE
Status: COMPLETED | OUTPATIENT
Start: 2020-01-01 | End: 2020-01-01

## 2020-01-01 RX ORDER — FLUTICASONE PROPIONATE 50 MCG
1 SPRAY, SUSPENSION (ML) NASAL
COMMUNITY
Start: 2020-01-01

## 2020-01-01 RX ORDER — MIDODRINE HYDROCHLORIDE 5 MG/1
5 TABLET ORAL
Status: DISCONTINUED | OUTPATIENT
Start: 2020-01-01 | End: 2020-01-01 | Stop reason: HOSPADM

## 2020-01-01 RX ORDER — METOLAZONE 2.5 MG/1
2.5 TABLET ORAL
Qty: 30 TABLET | Refills: 3 | Status: SHIPPED | OUTPATIENT
Start: 2020-01-01 | End: 2020-01-01 | Stop reason: ALTCHOICE

## 2020-01-01 RX ORDER — ACETAMINOPHEN 325 MG/1
650 TABLET ORAL EVERY 6 HOURS PRN
Status: DISCONTINUED | OUTPATIENT
Start: 2020-01-01 | End: 2020-01-01 | Stop reason: HOSPADM

## 2020-01-01 RX ORDER — GLIMEPIRIDE 2 MG/1
TABLET ORAL
COMMUNITY
Start: 2020-01-01 | End: 2020-01-01 | Stop reason: ALTCHOICE

## 2020-01-01 RX ORDER — POTASSIUM CHLORIDE 20 MEQ/1
20 TABLET, EXTENDED RELEASE ORAL 2 TIMES DAILY WITH MEALS
Qty: 60 TABLET | Refills: 3 | Status: SHIPPED | OUTPATIENT
Start: 2020-01-01 | End: 2020-01-01 | Stop reason: SDUPTHER

## 2020-01-01 RX ORDER — LORAZEPAM 0.5 MG/1
0.5 TABLET ORAL NIGHTLY
Qty: 30 TABLET | Refills: 0 | Status: SHIPPED | OUTPATIENT
Start: 2020-01-01 | End: 2020-01-01 | Stop reason: DRUGHIGH

## 2020-01-01 RX ORDER — LEVOTHYROXINE SODIUM 0.03 MG/1
TABLET ORAL
Qty: 38 TABLET | Refills: 11 | Status: SHIPPED | OUTPATIENT
Start: 2020-01-01

## 2020-01-01 RX ORDER — COLCHICINE 0.6 MG/1
0.6 TABLET ORAL DAILY
Status: DISCONTINUED | OUTPATIENT
Start: 2020-01-01 | End: 2020-01-01 | Stop reason: HOSPADM

## 2020-01-01 RX ORDER — ONDANSETRON 4 MG/1
4 TABLET, FILM COATED ORAL 3 TIMES DAILY PRN
Qty: 15 TABLET | Refills: 0 | Status: SHIPPED | OUTPATIENT
Start: 2020-01-01

## 2020-01-01 RX ORDER — WARFARIN SODIUM 1 MG/1
1 TABLET ORAL
Status: COMPLETED | OUTPATIENT
Start: 2020-01-01 | End: 2020-01-01

## 2020-01-01 RX ORDER — FLUTICASONE PROPIONATE 50 MCG
1 SPRAY, SUSPENSION (ML) NASAL DAILY
Status: DISCONTINUED | OUTPATIENT
Start: 2020-01-01 | End: 2020-01-01 | Stop reason: HOSPADM

## 2020-01-01 RX ORDER — QUETIAPINE FUMARATE 25 MG/1
25 TABLET, FILM COATED ORAL NIGHTLY
Qty: 30 TABLET | Refills: 3 | Status: SHIPPED | OUTPATIENT
Start: 2020-01-01 | End: 2020-01-01 | Stop reason: SINTOL

## 2020-01-01 RX ORDER — REPAGLINIDE 0.5 MG/1
TABLET ORAL
Qty: 270 TABLET | OUTPATIENT
Start: 2020-01-01

## 2020-01-01 RX ORDER — WARFARIN SODIUM 5 MG/1
5 TABLET ORAL DAILY
Status: DISCONTINUED | OUTPATIENT
Start: 2020-01-01 | End: 2020-01-01

## 2020-01-01 RX ORDER — 0.9 % SODIUM CHLORIDE 0.9 %
500 INTRAVENOUS SOLUTION INTRAVENOUS ONCE
Status: DISCONTINUED | OUTPATIENT
Start: 2020-01-01 | End: 2020-01-01

## 2020-01-01 RX ORDER — MIDODRINE HYDROCHLORIDE 5 MG/1
5 TABLET ORAL 3 TIMES DAILY
Qty: 270 TABLET | Refills: 1 | Status: SHIPPED | OUTPATIENT
Start: 2020-01-01

## 2020-01-01 RX ORDER — ACETAMINOPHEN 325 MG/1
650 TABLET ORAL EVERY 4 HOURS PRN
Status: DISCONTINUED | OUTPATIENT
Start: 2020-01-01 | End: 2020-01-01 | Stop reason: SDUPTHER

## 2020-01-01 RX ORDER — WARFARIN SODIUM 3 MG/1
3 TABLET ORAL
Status: COMPLETED | OUTPATIENT
Start: 2020-01-01 | End: 2020-01-01

## 2020-01-01 RX ORDER — SOTALOL HYDROCHLORIDE 80 MG/1
80 TABLET ORAL 2 TIMES DAILY
Status: DISCONTINUED | OUTPATIENT
Start: 2020-01-01 | End: 2020-01-01

## 2020-01-01 RX ORDER — REPAGLINIDE 0.5 MG/1
0.5 TABLET ORAL
Qty: 90 TABLET | Refills: 3 | Status: SHIPPED | OUTPATIENT
Start: 2020-01-01 | End: 2020-01-01

## 2020-01-01 RX ORDER — HYDROXYZINE HYDROCHLORIDE 25 MG/1
50 TABLET, FILM COATED ORAL 3 TIMES DAILY PRN
Status: DISCONTINUED | OUTPATIENT
Start: 2020-01-01 | End: 2020-01-01 | Stop reason: HOSPADM

## 2020-01-01 RX ORDER — 0.9 % SODIUM CHLORIDE 0.9 %
250 INTRAVENOUS SOLUTION INTRAVENOUS ONCE
Status: COMPLETED | OUTPATIENT
Start: 2020-01-01 | End: 2020-01-01

## 2020-01-01 RX ORDER — METOPROLOL SUCCINATE 25 MG/1
25 TABLET, EXTENDED RELEASE ORAL DAILY
Status: DISCONTINUED | OUTPATIENT
Start: 2020-01-01 | End: 2020-01-01 | Stop reason: HOSPADM

## 2020-01-01 RX ORDER — DEXTROSE MONOHYDRATE 25 G/50ML
100 INJECTION, SOLUTION INTRAVENOUS
COMMUNITY
Start: 2020-01-01 | End: 2020-01-01

## 2020-01-01 RX ORDER — LORAZEPAM 0.5 MG/1
0.5 TABLET ORAL NIGHTLY
Qty: 30 TABLET | Refills: 0 | Status: SHIPPED | OUTPATIENT
Start: 2020-01-01 | End: 2020-01-01

## 2020-01-01 RX ORDER — ONDANSETRON 2 MG/ML
4 INJECTION INTRAMUSCULAR; INTRAVENOUS EVERY 6 HOURS PRN
Status: DISCONTINUED | OUTPATIENT
Start: 2020-01-01 | End: 2020-01-01

## 2020-01-01 RX ORDER — WARFARIN SODIUM 2 MG/1
2 TABLET ORAL
Status: DISCONTINUED | OUTPATIENT
Start: 2020-01-01 | End: 2020-01-01 | Stop reason: HOSPADM

## 2020-01-01 RX ORDER — NITROGLYCERIN 0.4 MG/1
0.4 TABLET SUBLINGUAL EVERY 5 MIN PRN
Status: DISCONTINUED | OUTPATIENT
Start: 2020-01-01 | End: 2020-01-01 | Stop reason: HOSPADM

## 2020-01-01 RX ORDER — SPIRONOLACTONE 25 MG/1
TABLET ORAL
COMMUNITY
Start: 2020-01-01 | End: 2020-01-01 | Stop reason: ALTCHOICE

## 2020-01-01 RX ORDER — REPAGLINIDE 0.5 MG/1
TABLET ORAL
Qty: 90 TABLET | Refills: 3 | Status: SHIPPED | OUTPATIENT
Start: 2020-01-01

## 2020-01-01 RX ORDER — FUROSEMIDE 40 MG/1
40 TABLET ORAL DAILY
Qty: 60 TABLET | Refills: 3 | Status: ON HOLD | OUTPATIENT
Start: 2020-01-01 | End: 2020-01-01 | Stop reason: DRUGHIGH

## 2020-01-01 RX ORDER — WARFARIN SODIUM 1 MG/1
1 TABLET ORAL
COMMUNITY

## 2020-01-01 RX ORDER — METOPROLOL SUCCINATE 25 MG/1
25 TABLET, EXTENDED RELEASE ORAL DAILY
Status: DISCONTINUED | OUTPATIENT
Start: 2020-01-01 | End: 2020-01-01

## 2020-01-01 RX ORDER — ECHINACEA PURPUREA EXTRACT 125 MG
1 TABLET ORAL PRN
Qty: 1 BOTTLE | Refills: 3 | Status: SHIPPED | OUTPATIENT
Start: 2020-01-01 | End: 2020-01-01

## 2020-01-01 RX ORDER — WARFARIN SODIUM 1 MG/1
1 TABLET ORAL DAILY
Status: DISCONTINUED | OUTPATIENT
Start: 2020-01-01 | End: 2020-01-01 | Stop reason: DRUGHIGH

## 2020-01-01 RX ORDER — METOPROLOL SUCCINATE 25 MG/1
TABLET, EXTENDED RELEASE ORAL
Qty: 30 TABLET | Refills: 3 | Status: SHIPPED | OUTPATIENT
Start: 2020-01-01

## 2020-01-01 RX ORDER — MIDODRINE HYDROCHLORIDE 5 MG/1
5 TABLET ORAL 3 TIMES DAILY PRN
Qty: 90 TABLET | Refills: 0 | Status: SHIPPED | OUTPATIENT
Start: 2020-01-01 | End: 2020-01-01

## 2020-01-01 RX ORDER — MIDODRINE HYDROCHLORIDE 5 MG/1
10 TABLET ORAL
Status: DISCONTINUED | OUTPATIENT
Start: 2020-01-01 | End: 2020-01-01

## 2020-01-01 RX ORDER — TORSEMIDE 20 MG/1
20 TABLET ORAL 2 TIMES DAILY
Status: COMPLETED | OUTPATIENT
Start: 2020-01-01 | End: 2020-01-01

## 2020-01-01 RX ORDER — FUROSEMIDE 80 MG
80 TABLET ORAL 2 TIMES DAILY
Qty: 60 TABLET | Refills: 3 | Status: SHIPPED | OUTPATIENT
Start: 2020-01-01 | End: 2020-01-01 | Stop reason: SDUPTHER

## 2020-01-01 RX ORDER — METOPROLOL SUCCINATE 25 MG/1
25 TABLET, EXTENDED RELEASE ORAL 2 TIMES DAILY
Status: DISCONTINUED | OUTPATIENT
Start: 2020-01-01 | End: 2020-01-01 | Stop reason: HOSPADM

## 2020-01-01 RX ORDER — LEVOTHYROXINE SODIUM 0.03 MG/1
25 TABLET ORAL
Qty: 30 TABLET | Refills: 1 | Status: SHIPPED | OUTPATIENT
Start: 2020-01-01 | End: 2020-01-01

## 2020-01-01 RX ORDER — LEVOTHYROXINE SODIUM 0.05 MG/1
TABLET ORAL
Qty: 90 TABLET | Refills: 3 | Status: SHIPPED | OUTPATIENT
Start: 2020-01-01 | End: 2020-01-01

## 2020-01-01 RX ADMIN — POTASSIUM CHLORIDE 20 MEQ: 1500 TABLET, EXTENDED RELEASE ORAL at 17:53

## 2020-01-01 RX ADMIN — SPIRONOLACTONE 25 MG: 25 TABLET ORAL at 09:44

## 2020-01-01 RX ADMIN — LEVOTHYROXINE SODIUM 50 MCG: 0.05 TABLET ORAL at 06:28

## 2020-01-01 RX ADMIN — ASPIRIN 81 MG: 81 TABLET, COATED ORAL at 07:55

## 2020-01-01 RX ADMIN — SOTALOL HYDROCHLORIDE 80 MG: 80 TABLET ORAL at 20:57

## 2020-01-01 RX ADMIN — MIDODRINE HYDROCHLORIDE 5 MG: 5 TABLET ORAL at 22:28

## 2020-01-01 RX ADMIN — SODIUM CHLORIDE, PRESERVATIVE FREE 10 ML: 5 INJECTION INTRAVENOUS at 20:16

## 2020-01-01 RX ADMIN — AMIODARONE HYDROCHLORIDE 200 MG: 200 TABLET ORAL at 08:45

## 2020-01-01 RX ADMIN — LEVOTHYROXINE SODIUM 50 MCG: 50 TABLET ORAL at 05:12

## 2020-01-01 RX ADMIN — WARFARIN SODIUM 2 MG: 2 TABLET ORAL at 17:53

## 2020-01-01 RX ADMIN — ASPIRIN 81 MG: 81 TABLET, COATED ORAL at 08:27

## 2020-01-01 RX ADMIN — ACETAMINOPHEN 650 MG: 325 TABLET ORAL at 00:09

## 2020-01-01 RX ADMIN — SOTALOL HYDROCHLORIDE 80 MG: 80 TABLET ORAL at 07:56

## 2020-01-01 RX ADMIN — INSULIN LISPRO 2 UNITS: 100 INJECTION, SOLUTION INTRAVENOUS; SUBCUTANEOUS at 21:24

## 2020-01-01 RX ADMIN — LEVOTHYROXINE SODIUM 50 MCG: 0.05 TABLET ORAL at 04:33

## 2020-01-01 RX ADMIN — Medication 0.5 MG: at 18:44

## 2020-01-01 RX ADMIN — POTASSIUM CHLORIDE 40 MEQ: 1500 TABLET, EXTENDED RELEASE ORAL at 17:31

## 2020-01-01 RX ADMIN — ACETAMINOPHEN 650 MG: 325 TABLET, FILM COATED ORAL at 21:53

## 2020-01-01 RX ADMIN — LORAZEPAM 0.5 MG: 0.5 TABLET ORAL at 23:10

## 2020-01-01 RX ADMIN — Medication 10 ML: at 08:21

## 2020-01-01 RX ADMIN — Medication 10 ML: at 09:40

## 2020-01-01 RX ADMIN — FUROSEMIDE 40 MG: 40 TABLET ORAL at 17:23

## 2020-01-01 RX ADMIN — FUROSEMIDE 40 MG: 40 TABLET ORAL at 08:09

## 2020-01-01 RX ADMIN — POTASSIUM CHLORIDE 20 MEQ: 1500 TABLET, EXTENDED RELEASE ORAL at 18:09

## 2020-01-01 RX ADMIN — METOPROLOL SUCCINATE 25 MG: 25 TABLET, FILM COATED, EXTENDED RELEASE ORAL at 08:44

## 2020-01-01 RX ADMIN — SALINE NASAL SPRAY 1 SPRAY: 1.5 SOLUTION NASAL at 21:29

## 2020-01-01 RX ADMIN — ONDANSETRON 4 MG: 2 INJECTION INTRAMUSCULAR; INTRAVENOUS at 00:03

## 2020-01-01 RX ADMIN — MIDODRINE HYDROCHLORIDE 10 MG: 5 TABLET ORAL at 17:38

## 2020-01-01 RX ADMIN — SACUBITRIL AND VALSARTAN 1 TABLET: 24; 26 TABLET, FILM COATED ORAL at 20:21

## 2020-01-01 RX ADMIN — MIDODRINE HYDROCHLORIDE 10 MG: 5 TABLET ORAL at 11:51

## 2020-01-01 RX ADMIN — AMIODARONE HYDROCHLORIDE 200 MG: 200 TABLET ORAL at 20:22

## 2020-01-01 RX ADMIN — ACETAMINOPHEN 650 MG: 325 TABLET, FILM COATED ORAL at 18:46

## 2020-01-01 RX ADMIN — ACETAMINOPHEN 650 MG: 325 TABLET, FILM COATED ORAL at 00:39

## 2020-01-01 RX ADMIN — FLUTICASONE PROPIONATE 1 SPRAY: 50 SPRAY, METERED NASAL at 08:19

## 2020-01-01 RX ADMIN — METOPROLOL SUCCINATE 25 MG: 25 TABLET, EXTENDED RELEASE ORAL at 09:38

## 2020-01-01 RX ADMIN — SOTALOL HYDROCHLORIDE 80 MG: 80 TABLET ORAL at 21:00

## 2020-01-01 RX ADMIN — Medication 400 MG: at 09:37

## 2020-01-01 RX ADMIN — ASPIRIN 81 MG: 81 TABLET, COATED ORAL at 08:18

## 2020-01-01 RX ADMIN — MIDODRINE HYDROCHLORIDE 10 MG: 5 TABLET ORAL at 09:02

## 2020-01-01 RX ADMIN — ASPIRIN 325 MG: 325 TABLET, COATED ORAL at 08:09

## 2020-01-01 RX ADMIN — AMIODARONE HYDROCHLORIDE 200 MG: 200 TABLET ORAL at 20:21

## 2020-01-01 RX ADMIN — FUROSEMIDE 40 MG: 40 TABLET ORAL at 07:55

## 2020-01-01 RX ADMIN — SPIRONOLACTONE 25 MG: 25 TABLET ORAL at 08:44

## 2020-01-01 RX ADMIN — AMIODARONE HYDROCHLORIDE 200 MG: 200 TABLET ORAL at 13:29

## 2020-01-01 RX ADMIN — FUROSEMIDE 40 MG: 10 INJECTION, SOLUTION INTRAMUSCULAR; INTRAVENOUS at 17:57

## 2020-01-01 RX ADMIN — SPIRONOLACTONE 25 MG: 25 TABLET, FILM COATED ORAL at 00:35

## 2020-01-01 RX ADMIN — AMIODARONE HYDROCHLORIDE 200 MG: 200 TABLET ORAL at 09:02

## 2020-01-01 RX ADMIN — AMIODARONE HYDROCHLORIDE 200 MG: 200 TABLET ORAL at 20:16

## 2020-01-01 RX ADMIN — Medication 10 ML: at 08:28

## 2020-01-01 RX ADMIN — FUROSEMIDE 40 MG: 10 INJECTION, SOLUTION INTRAMUSCULAR; INTRAVENOUS at 09:00

## 2020-01-01 RX ADMIN — METOPROLOL SUCCINATE 25 MG: 25 TABLET, FILM COATED, EXTENDED RELEASE ORAL at 11:50

## 2020-01-01 RX ADMIN — ACETAMINOPHEN 650 MG: 325 TABLET ORAL at 23:53

## 2020-01-01 RX ADMIN — ACETAMINOPHEN 650 MG: 325 TABLET, FILM COATED ORAL at 09:41

## 2020-01-01 RX ADMIN — FUROSEMIDE 40 MG: 10 INJECTION, SOLUTION INTRAMUSCULAR; INTRAVENOUS at 15:07

## 2020-01-01 RX ADMIN — ASPIRIN 325 MG: 325 TABLET, COATED ORAL at 08:58

## 2020-01-01 RX ADMIN — INSULIN LISPRO 1 UNITS: 100 INJECTION, SOLUTION INTRAVENOUS; SUBCUTANEOUS at 12:46

## 2020-01-01 RX ADMIN — ASPIRIN 81 MG: 81 TABLET, COATED ORAL at 15:06

## 2020-01-01 RX ADMIN — SPIRONOLACTONE 25 MG: 25 TABLET ORAL at 09:02

## 2020-01-01 RX ADMIN — LORAZEPAM 0.5 MG: 2 INJECTION INTRAMUSCULAR; INTRAVENOUS at 00:36

## 2020-01-01 RX ADMIN — FUROSEMIDE 40 MG: 10 INJECTION, SOLUTION INTRAMUSCULAR; INTRAVENOUS at 18:09

## 2020-01-01 RX ADMIN — INSULIN LISPRO 1 UNITS: 100 INJECTION, SOLUTION INTRAVENOUS; SUBCUTANEOUS at 21:10

## 2020-01-01 RX ADMIN — ASPIRIN 325 MG: 325 TABLET, COATED ORAL at 09:02

## 2020-01-01 RX ADMIN — LEVOTHYROXINE SODIUM 50 MCG: 0.05 TABLET ORAL at 08:27

## 2020-01-01 RX ADMIN — LEVOTHYROXINE SODIUM 50 MCG: 50 TABLET ORAL at 08:44

## 2020-01-01 RX ADMIN — METOPROLOL SUCCINATE 25 MG: 25 TABLET, FILM COATED, EXTENDED RELEASE ORAL at 09:44

## 2020-01-01 RX ADMIN — MIDODRINE HYDROCHLORIDE 10 MG: 5 TABLET ORAL at 16:56

## 2020-01-01 RX ADMIN — WARFARIN SODIUM 1 MG: 1 TABLET ORAL at 21:00

## 2020-01-01 RX ADMIN — MIDODRINE HYDROCHLORIDE 5 MG: 5 TABLET ORAL at 13:26

## 2020-01-01 RX ADMIN — ASPIRIN 325 MG: 325 TABLET, COATED ORAL at 09:44

## 2020-01-01 RX ADMIN — INSULIN LISPRO 4 UNITS: 100 INJECTION, SOLUTION INTRAVENOUS; SUBCUTANEOUS at 11:56

## 2020-01-01 RX ADMIN — METOPROLOL SUCCINATE 25 MG: 25 TABLET, EXTENDED RELEASE ORAL at 07:55

## 2020-01-01 RX ADMIN — WARFARIN SODIUM 2 MG: 2 TABLET ORAL at 18:12

## 2020-01-01 RX ADMIN — WARFARIN SODIUM 2 MG: 2 TABLET ORAL at 18:00

## 2020-01-01 RX ADMIN — SACUBITRIL AND VALSARTAN 1 TABLET: 24; 26 TABLET, FILM COATED ORAL at 13:29

## 2020-01-01 RX ADMIN — SODIUM CHLORIDE, PRESERVATIVE FREE 10 ML: 5 INJECTION INTRAVENOUS at 10:05

## 2020-01-01 RX ADMIN — INSULIN LISPRO 1 UNITS: 100 INJECTION, SOLUTION INTRAVENOUS; SUBCUTANEOUS at 20:16

## 2020-01-01 RX ADMIN — LEVOTHYROXINE SODIUM 50 MCG: 0.05 TABLET ORAL at 15:06

## 2020-01-01 RX ADMIN — SODIUM CHLORIDE: 9 INJECTION, SOLUTION INTRAVENOUS at 00:23

## 2020-01-01 RX ADMIN — DOCUSATE SODIUM 100 MG: 100 CAPSULE, LIQUID FILLED ORAL at 09:38

## 2020-01-01 RX ADMIN — AMIODARONE HYDROCHLORIDE 200 MG: 200 TABLET ORAL at 08:09

## 2020-01-01 RX ADMIN — SPIRONOLACTONE 25 MG: 25 TABLET ORAL at 08:09

## 2020-01-01 RX ADMIN — FUROSEMIDE 40 MG: 10 INJECTION, SOLUTION INTRAMUSCULAR; INTRAVENOUS at 21:23

## 2020-01-01 RX ADMIN — WARFARIN SODIUM 2 MG: 2 TABLET ORAL at 17:38

## 2020-01-01 RX ADMIN — LORAZEPAM 0.5 MG: 0.5 TABLET ORAL at 19:58

## 2020-01-01 RX ADMIN — POTASSIUM CHLORIDE 20 MEQ: 1500 TABLET, EXTENDED RELEASE ORAL at 13:51

## 2020-01-01 RX ADMIN — SODIUM CHLORIDE, PRESERVATIVE FREE 10 ML: 5 INJECTION INTRAVENOUS at 09:44

## 2020-01-01 RX ADMIN — COLCHICINE 0.6 MG: 0.6 TABLET, FILM COATED ORAL at 08:18

## 2020-01-01 RX ADMIN — FUROSEMIDE 20 MG: 10 INJECTION, SOLUTION INTRAMUSCULAR; INTRAVENOUS at 17:31

## 2020-01-01 RX ADMIN — MIDODRINE HYDROCHLORIDE 10 MG: 5 TABLET ORAL at 18:12

## 2020-01-01 RX ADMIN — WARFARIN SODIUM 2 MG: 2 TABLET ORAL at 21:24

## 2020-01-01 RX ADMIN — Medication 10 ML: at 20:58

## 2020-01-01 RX ADMIN — Medication 10 ML: at 09:41

## 2020-01-01 RX ADMIN — LEVOTHYROXINE SODIUM 50 MCG: 50 TABLET ORAL at 05:39

## 2020-01-01 RX ADMIN — SPIRONOLACTONE 25 MG: 25 TABLET ORAL at 08:58

## 2020-01-01 RX ADMIN — SODIUM CHLORIDE, PRESERVATIVE FREE 10 ML: 5 INJECTION INTRAVENOUS at 20:21

## 2020-01-01 RX ADMIN — MIDODRINE HYDROCHLORIDE 10 MG: 5 TABLET ORAL at 11:56

## 2020-01-01 RX ADMIN — Medication 10 ML: at 09:01

## 2020-01-01 RX ADMIN — LEVOTHYROXINE SODIUM 50 MCG: 0.05 TABLET ORAL at 05:40

## 2020-01-01 RX ADMIN — POTASSIUM CHLORIDE 40 MEQ: 1500 TABLET, EXTENDED RELEASE ORAL at 00:35

## 2020-01-01 RX ADMIN — ASPIRIN 81 MG: 81 TABLET, COATED ORAL at 09:37

## 2020-01-01 RX ADMIN — MIDODRINE HYDROCHLORIDE 5 MG: 5 TABLET ORAL at 13:51

## 2020-01-01 RX ADMIN — POTASSIUM CHLORIDE 20 MEQ: 1500 TABLET, EXTENDED RELEASE ORAL at 09:41

## 2020-01-01 RX ADMIN — SODIUM CHLORIDE, PRESERVATIVE FREE 10 ML: 5 INJECTION INTRAVENOUS at 09:02

## 2020-01-01 RX ADMIN — INSULIN LISPRO 1 UNITS: 100 INJECTION, SOLUTION INTRAVENOUS; SUBCUTANEOUS at 09:53

## 2020-01-01 RX ADMIN — SACUBITRIL AND VALSARTAN 1 TABLET: 24; 26 TABLET, FILM COATED ORAL at 21:24

## 2020-01-01 RX ADMIN — LORAZEPAM 0.5 MG: 0.5 TABLET ORAL at 21:53

## 2020-01-01 RX ADMIN — INSULIN LISPRO 2 UNITS: 100 INJECTION, SOLUTION INTRAVENOUS; SUBCUTANEOUS at 17:23

## 2020-01-01 RX ADMIN — Medication 400 MG: at 09:00

## 2020-01-01 RX ADMIN — FLUTICASONE PROPIONATE 1 SPRAY: 50 SPRAY, METERED NASAL at 08:33

## 2020-01-01 RX ADMIN — WARFARIN SODIUM 1 MG: 1 TABLET ORAL at 18:09

## 2020-01-01 RX ADMIN — LEVOTHYROXINE SODIUM 50 MCG: 50 TABLET ORAL at 07:56

## 2020-01-01 RX ADMIN — MIDODRINE HYDROCHLORIDE 5 MG: 5 TABLET ORAL at 18:43

## 2020-01-01 RX ADMIN — Medication 10 ML: at 09:47

## 2020-01-01 RX ADMIN — SODIUM CHLORIDE, PRESERVATIVE FREE 10 ML: 5 INJECTION INTRAVENOUS at 20:50

## 2020-01-01 RX ADMIN — SODIUM CHLORIDE, PRESERVATIVE FREE 10 ML: 5 INJECTION INTRAVENOUS at 08:45

## 2020-01-01 RX ADMIN — MIDODRINE HYDROCHLORIDE 5 MG: 5 TABLET ORAL at 13:16

## 2020-01-01 RX ADMIN — FUROSEMIDE 40 MG: 10 INJECTION, SOLUTION INTRAVENOUS at 11:00

## 2020-01-01 RX ADMIN — SPIRONOLACTONE 25 MG: 25 TABLET, FILM COATED ORAL at 08:27

## 2020-01-01 RX ADMIN — SPIRONOLACTONE 25 MG: 25 TABLET, FILM COATED ORAL at 09:01

## 2020-01-01 RX ADMIN — SPIRONOLACTONE 25 MG: 25 TABLET, FILM COATED ORAL at 08:18

## 2020-01-01 RX ADMIN — ONDANSETRON 4 MG: 2 INJECTION INTRAMUSCULAR; INTRAVENOUS at 21:56

## 2020-01-01 RX ADMIN — HYDROXYZINE HYDROCHLORIDE 50 MG: 25 TABLET, FILM COATED ORAL at 15:06

## 2020-01-01 RX ADMIN — MAGNESIUM SULFATE HEPTAHYDRATE 1 G: 1 INJECTION, SOLUTION INTRAVENOUS at 12:58

## 2020-01-01 RX ADMIN — METOPROLOL SUCCINATE 25 MG: 25 TABLET, EXTENDED RELEASE ORAL at 08:18

## 2020-01-01 RX ADMIN — METOPROLOL SUCCINATE 25 MG: 25 TABLET, FILM COATED, EXTENDED RELEASE ORAL at 08:58

## 2020-01-01 RX ADMIN — INSULIN LISPRO 1 UNITS: 100 INJECTION, SOLUTION INTRAVENOUS; SUBCUTANEOUS at 17:38

## 2020-01-01 RX ADMIN — LEVOTHYROXINE SODIUM 50 MCG: 50 TABLET ORAL at 09:02

## 2020-01-01 RX ADMIN — ASPIRIN 325 MG: 325 TABLET, COATED ORAL at 08:44

## 2020-01-01 RX ADMIN — Medication 10 ML: at 20:21

## 2020-01-01 RX ADMIN — INSULIN LISPRO 1 UNITS: 100 INJECTION, SOLUTION INTRAVENOUS; SUBCUTANEOUS at 21:00

## 2020-01-01 RX ADMIN — SPIRONOLACTONE 25 MG: 25 TABLET, FILM COATED ORAL at 15:07

## 2020-01-01 RX ADMIN — SACUBITRIL AND VALSARTAN 1 TABLET: 24; 26 TABLET, FILM COATED ORAL at 08:44

## 2020-01-01 RX ADMIN — FUROSEMIDE 40 MG: 10 INJECTION, SOLUTION INTRAMUSCULAR; INTRAVENOUS at 09:40

## 2020-01-01 RX ADMIN — INSULIN LISPRO 2 UNITS: 100 INJECTION, SOLUTION INTRAVENOUS; SUBCUTANEOUS at 13:08

## 2020-01-01 RX ADMIN — FLUTICASONE PROPIONATE 1 SPRAY: 50 SPRAY, METERED NASAL at 07:56

## 2020-01-01 RX ADMIN — MIDODRINE HYDROCHLORIDE 10 MG: 5 TABLET ORAL at 08:57

## 2020-01-01 RX ADMIN — POTASSIUM CHLORIDE 40 MEQ: 1500 TABLET, EXTENDED RELEASE ORAL at 08:28

## 2020-01-01 RX ADMIN — SPIRONOLACTONE 25 MG: 25 TABLET, FILM COATED ORAL at 09:37

## 2020-01-01 RX ADMIN — Medication 10 ML: at 20:50

## 2020-01-01 RX ADMIN — ACETAMINOPHEN 650 MG: 325 TABLET, FILM COATED ORAL at 22:03

## 2020-01-01 RX ADMIN — INSULIN LISPRO 4 UNITS: 100 INJECTION, SOLUTION INTRAVENOUS; SUBCUTANEOUS at 13:19

## 2020-01-01 RX ADMIN — MIDODRINE HYDROCHLORIDE 10 MG: 5 TABLET ORAL at 12:46

## 2020-01-01 RX ADMIN — INSULIN LISPRO 1 UNITS: 100 INJECTION, SOLUTION INTRAVENOUS; SUBCUTANEOUS at 20:50

## 2020-01-01 RX ADMIN — FUROSEMIDE 40 MG: 40 TABLET ORAL at 08:58

## 2020-01-01 RX ADMIN — ACETAMINOPHEN 650 MG: 325 TABLET ORAL at 03:46

## 2020-01-01 RX ADMIN — INSULIN LISPRO 2 UNITS: 100 INJECTION, SOLUTION INTRAVENOUS; SUBCUTANEOUS at 17:55

## 2020-01-01 RX ADMIN — MAGNESIUM SULFATE HEPTAHYDRATE 1 G: 1 INJECTION, SOLUTION INTRAVENOUS at 23:39

## 2020-01-01 RX ADMIN — AMIODARONE HYDROCHLORIDE 200 MG: 200 TABLET ORAL at 09:44

## 2020-01-01 RX ADMIN — MIDODRINE HYDROCHLORIDE 10 MG: 5 TABLET ORAL at 12:56

## 2020-01-01 RX ADMIN — SACUBITRIL AND VALSARTAN 1 TABLET: 24; 26 TABLET, FILM COATED ORAL at 01:57

## 2020-01-01 RX ADMIN — SODIUM CHLORIDE, PRESERVATIVE FREE 10 ML: 5 INJECTION INTRAVENOUS at 07:55

## 2020-01-01 RX ADMIN — FUROSEMIDE 40 MG: 40 TABLET ORAL at 08:45

## 2020-01-01 RX ADMIN — INSULIN LISPRO 1 UNITS: 100 INJECTION, SOLUTION INTRAVENOUS; SUBCUTANEOUS at 20:21

## 2020-01-01 RX ADMIN — DOCUSATE SODIUM 100 MG: 100 CAPSULE, LIQUID FILLED ORAL at 08:57

## 2020-01-01 RX ADMIN — SPIRONOLACTONE 25 MG: 25 TABLET, FILM COATED ORAL at 07:55

## 2020-01-01 RX ADMIN — FUROSEMIDE 40 MG: 10 INJECTION, SOLUTION INTRAMUSCULAR; INTRAVENOUS at 17:55

## 2020-01-01 RX ADMIN — METOLAZONE 2.5 MG: 2.5 TABLET ORAL at 09:40

## 2020-01-01 RX ADMIN — TORSEMIDE 20 MG: 20 TABLET ORAL at 10:59

## 2020-01-01 RX ADMIN — ASPIRIN 81 MG: 81 TABLET, COATED ORAL at 09:00

## 2020-01-01 RX ADMIN — POTASSIUM CHLORIDE 20 MEQ: 1500 TABLET, EXTENDED RELEASE ORAL at 09:00

## 2020-01-01 RX ADMIN — INSULIN LISPRO 1 UNITS: 100 INJECTION, SOLUTION INTRAVENOUS; SUBCUTANEOUS at 20:22

## 2020-01-01 RX ADMIN — METOPROLOL SUCCINATE 25 MG: 25 TABLET, FILM COATED, EXTENDED RELEASE ORAL at 08:10

## 2020-01-01 RX ADMIN — Medication 240 ML: at 21:07

## 2020-01-01 RX ADMIN — POTASSIUM CHLORIDE 40 MEQ: 1500 TABLET, EXTENDED RELEASE ORAL at 09:01

## 2020-01-01 RX ADMIN — ACETAMINOPHEN 650 MG: 325 TABLET, FILM COATED ORAL at 21:39

## 2020-01-01 RX ADMIN — METOPROLOL SUCCINATE 25 MG: 25 TABLET, EXTENDED RELEASE ORAL at 15:07

## 2020-01-01 RX ADMIN — METOPROLOL SUCCINATE 25 MG: 25 TABLET, EXTENDED RELEASE ORAL at 09:47

## 2020-01-01 RX ADMIN — FUROSEMIDE 40 MG: 40 TABLET ORAL at 09:02

## 2020-01-01 RX ADMIN — MIDODRINE HYDROCHLORIDE 5 MG: 5 TABLET ORAL at 08:09

## 2020-01-01 RX ADMIN — SODIUM CHLORIDE: 9 INJECTION, SOLUTION INTRAVENOUS at 13:25

## 2020-01-01 RX ADMIN — DOCUSATE SODIUM 100 MG: 100 CAPSULE, LIQUID FILLED ORAL at 09:44

## 2020-01-01 RX ADMIN — FUROSEMIDE 40 MG: 10 INJECTION, SOLUTION INTRAMUSCULAR; INTRAVENOUS at 09:38

## 2020-01-01 RX ADMIN — SPIRONOLACTONE 25 MG: 25 TABLET ORAL at 11:51

## 2020-01-01 RX ADMIN — INSULIN LISPRO 1 UNITS: 100 INJECTION, SOLUTION INTRAVENOUS; SUBCUTANEOUS at 16:57

## 2020-01-01 RX ADMIN — TORSEMIDE 20 MG: 20 TABLET ORAL at 21:03

## 2020-01-01 RX ADMIN — SACUBITRIL AND VALSARTAN 1 TABLET: 24; 26 TABLET, FILM COATED ORAL at 09:02

## 2020-01-01 RX ADMIN — SODIUM CHLORIDE, PRESERVATIVE FREE 10 ML: 5 INJECTION INTRAVENOUS at 20:00

## 2020-01-01 RX ADMIN — ASPIRIN 81 MG: 81 TABLET, COATED ORAL at 09:40

## 2020-01-01 RX ADMIN — Medication 10 ML: at 21:28

## 2020-01-01 RX ADMIN — LEVOTHYROXINE SODIUM 50 MCG: 0.05 TABLET ORAL at 05:33

## 2020-01-01 RX ADMIN — MIDODRINE HYDROCHLORIDE 10 MG: 5 TABLET ORAL at 08:44

## 2020-01-01 RX ADMIN — Medication 10 ML: at 19:59

## 2020-01-01 RX ADMIN — LEVOTHYROXINE SODIUM 50 MCG: 50 TABLET ORAL at 06:01

## 2020-01-01 RX ADMIN — WARFARIN SODIUM 3 MG: 3 TABLET ORAL at 17:39

## 2020-01-01 RX ADMIN — METOLAZONE 2.5 MG: 2.5 TABLET ORAL at 09:00

## 2020-01-01 RX ADMIN — METOPROLOL SUCCINATE 25 MG: 25 TABLET, EXTENDED RELEASE ORAL at 09:01

## 2020-01-01 RX ADMIN — Medication 10 ML: at 07:56

## 2020-01-01 RX ADMIN — INSULIN LISPRO 2 UNITS: 100 INJECTION, SOLUTION INTRAVENOUS; SUBCUTANEOUS at 12:02

## 2020-01-01 RX ADMIN — MIDODRINE HYDROCHLORIDE 10 MG: 5 TABLET ORAL at 17:39

## 2020-01-01 RX ADMIN — SODIUM CHLORIDE 250 ML: 9 INJECTION, SOLUTION INTRAVENOUS at 01:29

## 2020-01-01 RX ADMIN — INSULIN LISPRO 1 UNITS: 100 INJECTION, SOLUTION INTRAVENOUS; SUBCUTANEOUS at 18:43

## 2020-01-01 RX ADMIN — AMIODARONE HYDROCHLORIDE 200 MG: 200 TABLET ORAL at 08:58

## 2020-01-01 RX ADMIN — FUROSEMIDE 40 MG: 10 INJECTION, SOLUTION INTRAMUSCULAR; INTRAVENOUS at 08:18

## 2020-01-01 RX ADMIN — SODIUM CHLORIDE 500 ML: 9 INJECTION, SOLUTION INTRAVENOUS at 10:15

## 2020-01-01 RX ADMIN — Medication 10 ML: at 21:04

## 2020-01-01 RX ADMIN — FUROSEMIDE 40 MG: 40 TABLET ORAL at 09:44

## 2020-01-01 RX ADMIN — FUROSEMIDE 40 MG: 10 INJECTION, SOLUTION INTRAMUSCULAR; INTRAVENOUS at 08:28

## 2020-01-01 RX ADMIN — AMIODARONE HYDROCHLORIDE 200 MG: 200 TABLET ORAL at 20:00

## 2020-01-01 ASSESSMENT — ENCOUNTER SYMPTOMS
CONSTIPATION: 0
COUGH: 0
CONSTIPATION: 1
ABDOMINAL PAIN: 0
EYES NEGATIVE: 1
WHEEZING: 0
ABDOMINAL PAIN: 0
SHORTNESS OF BREATH: 1
EYE REDNESS: 0
NAUSEA: 0
DIARRHEA: 0
COUGH: 0
VOMITING: 0
DIARRHEA: 0
ABDOMINAL PAIN: 0
CHEST TIGHTNESS: 0
COLOR CHANGE: 0
ABDOMINAL PAIN: 0
ABDOMINAL PAIN: 0
BACK PAIN: 0
VOMITING: 0
CONSTIPATION: 0
COUGH: 0
RHINORRHEA: 0
WHEEZING: 0
SHORTNESS OF BREATH: 0
COUGH: 0
EYE PAIN: 0
FACIAL SWELLING: 0
DIARRHEA: 0
EYE DISCHARGE: 0
SHORTNESS OF BREATH: 0
ABDOMINAL DISTENTION: 0
ABDOMINAL PAIN: 0
SORE THROAT: 0
VOMITING: 0
SINUS PRESSURE: 0
BLOOD IN STOOL: 0
BLOOD IN STOOL: 0
NAUSEA: 1
SHORTNESS OF BREATH: 1
FACIAL SWELLING: 0
CONSTIPATION: 0
TROUBLE SWALLOWING: 0
COLOR CHANGE: 0
RHINORRHEA: 0
TROUBLE SWALLOWING: 0
SORE THROAT: 0
DIARRHEA: 0
CONSTIPATION: 0
EYES NEGATIVE: 1
WHEEZING: 0
CHEST TIGHTNESS: 0
COUGH: 0
EYE REDNESS: 0
COUGH: 0
SHORTNESS OF BREATH: 0
SHORTNESS OF BREATH: 1
DIARRHEA: 0
SORE THROAT: 0
CONSTIPATION: 0
COLOR CHANGE: 0
CONSTIPATION: 0
SHORTNESS OF BREATH: 1
SORE THROAT: 0
SHORTNESS OF BREATH: 1
ABDOMINAL PAIN: 0
NAUSEA: 0
COLOR CHANGE: 0
ABDOMINAL DISTENTION: 0
CONSTIPATION: 0
VOMITING: 0
SINUS PRESSURE: 0
COUGH: 1
NAUSEA: 0
EYES NEGATIVE: 1
ABDOMINAL PAIN: 0
WHEEZING: 0
BLOOD IN STOOL: 0
VOMITING: 0
RHINORRHEA: 0
EYES NEGATIVE: 1
COLOR CHANGE: 0
COUGH: 0
NAUSEA: 0
SHORTNESS OF BREATH: 0
NAUSEA: 0
COLOR CHANGE: 0
COUGH: 0
SORE THROAT: 0
SORE THROAT: 0
BACK PAIN: 0
CHEST TIGHTNESS: 0
NAUSEA: 0
VOMITING: 0
WHEEZING: 0
DIARRHEA: 0
VOMITING: 0
ALLERGIC/IMMUNOLOGIC NEGATIVE: 1
RHINORRHEA: 0
ABDOMINAL PAIN: 0
VOMITING: 0
NAUSEA: 0
SINUS PAIN: 0
SHORTNESS OF BREATH: 1
SHORTNESS OF BREATH: 0
DIARRHEA: 0
EYES NEGATIVE: 1
ALLERGIC/IMMUNOLOGIC NEGATIVE: 1
BACK PAIN: 0
EYE DISCHARGE: 0
DIARRHEA: 0
COLOR CHANGE: 0
NAUSEA: 0
EYE ITCHING: 0
SHORTNESS OF BREATH: 1
NAUSEA: 0
BACK PAIN: 0
CONSTIPATION: 0
COLOR CHANGE: 0
EYES NEGATIVE: 1
COUGH: 0
FACIAL SWELLING: 0
ABDOMINAL DISTENTION: 0
STRIDOR: 0
COUGH: 0
SORE THROAT: 0
BACK PAIN: 0
CHEST TIGHTNESS: 0
CONSTIPATION: 0
ABDOMINAL PAIN: 0
CHEST TIGHTNESS: 0
ABDOMINAL PAIN: 0
VOMITING: 0
COUGH: 0
SHORTNESS OF BREATH: 0
EYE DISCHARGE: 0
RHINORRHEA: 0
CHEST TIGHTNESS: 0
SORE THROAT: 0
BACK PAIN: 0
ALLERGIC/IMMUNOLOGIC NEGATIVE: 1
VOMITING: 0
EYE REDNESS: 0
CONSTIPATION: 0
EYE PAIN: 0
ABDOMINAL DISTENTION: 0
NAUSEA: 0
VOMITING: 0
COUGH: 1
DIARRHEA: 0
EYE PAIN: 0
EYE ITCHING: 0
BLOOD IN STOOL: 0
NAUSEA: 0
DIARRHEA: 0
COUGH: 0
SHORTNESS OF BREATH: 1
SINUS PRESSURE: 0
EYE DISCHARGE: 0
WHEEZING: 0
SINUS PAIN: 0
RHINORRHEA: 0
ALLERGIC/IMMUNOLOGIC NEGATIVE: 1
STRIDOR: 0
SHORTNESS OF BREATH: 1
COUGH: 0
SHORTNESS OF BREATH: 1
DIARRHEA: 0
NAUSEA: 0
VOMITING: 0
DIARRHEA: 0
SHORTNESS OF BREATH: 1
EYE DISCHARGE: 0
EYE PAIN: 0
WHEEZING: 0
TROUBLE SWALLOWING: 0
VOMITING: 0
EYE PAIN: 0
CONSTIPATION: 0
BLOOD IN STOOL: 0
CONSTIPATION: 1
SHORTNESS OF BREATH: 0
VOICE CHANGE: 0
BACK PAIN: 0

## 2020-01-01 ASSESSMENT — PATIENT HEALTH QUESTIONNAIRE - PHQ9
SUM OF ALL RESPONSES TO PHQ9 QUESTIONS 1 & 2: 0
2. FEELING DOWN, DEPRESSED OR HOPELESS: 0
1. LITTLE INTEREST OR PLEASURE IN DOING THINGS: 0
SUM OF ALL RESPONSES TO PHQ QUESTIONS 1-9: 0
1. LITTLE INTEREST OR PLEASURE IN DOING THINGS: 0
SUM OF ALL RESPONSES TO PHQ9 QUESTIONS 1 & 2: 0
2. FEELING DOWN, DEPRESSED OR HOPELESS: 0

## 2020-01-01 ASSESSMENT — PAIN SCALES - GENERAL
PAINLEVEL_OUTOF10: 0
PAINLEVEL_OUTOF10: 0
PAINLEVEL_OUTOF10: 3
PAINLEVEL_OUTOF10: 0
PAINLEVEL_OUTOF10: 4
PAINLEVEL_OUTOF10: 0
PAINLEVEL_OUTOF10: 4
PAINLEVEL_OUTOF10: 2
PAINLEVEL_OUTOF10: 0
PAINLEVEL_OUTOF10: 3
PAINLEVEL_OUTOF10: 0
PAINLEVEL_OUTOF10: 6
PAINLEVEL_OUTOF10: 0
PAINLEVEL_OUTOF10: 9
PAINLEVEL_OUTOF10: 3
PAINLEVEL_OUTOF10: 0
PAINLEVEL_OUTOF10: 1

## 2020-01-01 ASSESSMENT — PAIN DESCRIPTION - ORIENTATION: ORIENTATION: RIGHT;LEFT

## 2020-01-01 ASSESSMENT — PAIN DESCRIPTION - PAIN TYPE: TYPE: CHRONIC PAIN

## 2020-01-01 ASSESSMENT — PAIN DESCRIPTION - LOCATION: LOCATION: LEG

## 2020-01-03 PROBLEM — I47.20 VT (VENTRICULAR TACHYCARDIA): Status: ACTIVE | Noted: 2020-01-01

## 2020-01-03 NOTE — CARE COORDINATION
Attempting to reach patient for a follow up care coordination call regarding any needs, questions or concerns. Will discuss graduation with PCP at this time. Patient has nit had any needs from CC. Will be happy to assist in the future with any issues.

## 2020-01-04 PROBLEM — Z45.02 ICD (IMPLANTABLE CARDIOVERTER-DEFIBRILLATOR) DISCHARGE: Status: ACTIVE | Noted: 2020-01-01

## 2020-01-04 NOTE — PROGRESS NOTES
Pt admitted to the floor, no c/o of pain at this time, no signs of distress. Vitals completed. Paged cardiology for stat consult. 12 lead completed when admitted to the floor, SBP in the 80's. Amiodarone ordered to start, but pt states to writer that when she took it in the past it made her sick and she is concerned about the IV version doing the same. Let cardiology know about this as well. Will continue to monitor and wait for additional orders.

## 2020-01-04 NOTE — H&P
[address] [facility]    HISTORY AND PHYSICAL EXAMINATION            Date:   1/4/2020  Patient name:  Magalys Plasencia  Date of admission:  1/3/2020  8:29 PM  MRN:   [id number]  Account:  [de-identified]  YOB: 1940  PCP:    Antoine Amaya MD  Room:   2019/2019-01  Code Status:    Full Code    Chief Complaint:     Chief Complaint   Patient presents with    Shortness of Breath     Pt calls EMS for SOB, after her ACD fired, per Dorota Hough, pt had a run of Vtach       History Obtained From:     patient, electronic medical record    History of Present Illness:     Magalys Plasencia is a [de-identified] y.o. Non-/non  female who presents with Shortness of Breath (Pt calls EMS for SOB, after her ACD fired, per Dorota Hough, pt had a run of [place])   and is admitted to the hospital for the management of AICD discharge. This is a pleasant 71-year-old female with a past medical history significant of atrial fibrillation coronary artery disease systolic heart failure/idiopathic cardiomyopathy with ICD diabetes hypertension, hypothyroidism who presented to emergency room for AICD firing. Patient states that she has been feeling markedly fatigued for the past few days but is recovering from a UTI and associated it with that however today she had a generalized episode of \"shaking\" with associated shortness of breath and diaphoretic and she decided to call EMS and then developed  worsening of the shortness of breath and her defibrillator fired. In route EMS did note there were multiple runs of ventricular tachycardia were not sustained and not aborted by defibrillator requiring repeat defibrillation. Work-up in the emergency room revealed metabolic panel with a sodium of 134 and a creatinine of 1.11 glucose of 245.   High sensitive troponin of 15 proBNP 2659 however she is on Entresto, hemogram without significant cytosis or anemia, INR 3.0, urinalysis with mild leukocytes no nitrates. However she did just get over a urinary tract infection-unsure which antibiotic she was given. EKG was completed which showed ventricular paced rhythm with a QTC of 593 and a . Patient was recommended for admission and placed on amiodarone drip however she declines the use of the amiodarone drip at this time. She states that it makes her sick and she does not want to take it or have it infused. Past Medical History:     Past Medical History:   Diagnosis Date    Atrial fibrillation ([facility] [address]     Breast cancer, left breast ([facility] [address] 12/2/13    pt being seen at [place] [address]. s/p lumpectomy    CAD (coronary artery disease)     non-obstructive    [facility] 08/08/2014    inpt BPCH- EF 15-20% 7/25/16    Diabetes ([facility] [address] [facility]     Diabetes ([facility] [address] [facility]     non-insulin    Female bladder prolapse     GERD (gastroesophageal reflux disease) 4/23/2014    HLD (hyperlipidemia)     unable to tolerate meds    Hypertension     Hypothyroid     Hypothyroidism     Idiopathic cardiomyopathy ([facility] [address]     severe    Kidney disease     stage II    Low back pain 9/26/2017    Personal history of breast cancer 12/2/13    left-following with Dr Adam Castellano Type 2 diabetes mellitus with neurologic complication ([facility] 49/19/0302    Vision abnormalities         Past Surgical History:     Past Surgical History:   Procedure Laterality Date    APPENDECTOMY      ATRIAL ABLATION SURGERY      BREAST BIOPSY Left 12/2/13    Invasive Mucinous Cancer of Breast    BREAST LUMPECTOMY      Left breast    CARDIAC DEFIBRILLATOR PLACEMENT      pacemaker / AICD    CATARACT REMOVAL WITH IMPLANT Bilateral     CORONARY ANGIOPLASTY          Medications Prior to Admission:     Prior to Admission medications    Medication Sig Start Date End Date Taking?  Authorizing Provider   metoprolol succinate (TOPROL XL) 25 MG extended release tablet TAKE 1 TABLET DAILY 10/9/19 Yes Kathy Álvarez MD   glimepiride (AMARYL) 2 MG tablet TAKE 1 TABLET EVERY MORNING BEFORE BREAKFAST 10/8/19  Yes Kathy Álvarez MD   levothyroxine (SYNTHROID) 50 MCG tablet Take 1 tablet by mouth Daily 5/17/19  Yes Kathy Álvarez MD   sacubitril-valsartan (ENTRESTO) 24-26 MG per tablet Take 1 tablet by mouth 2 times daily   Yes Historical Provider, MD   furosemide (LASIX) 40 MG tablet TAKE 1 TABLET TWICE A DAY 11/26/18  Yes Kathy Álvarez MD   warfarin (COUMADIN) 1 MG tablet TAKE 2 TABLETS EVERY OTHER DAY 10/23/18  Yes Kathy Álvarez MD   warfarin (COUMADIN) 3 MG tablet TAKE 1 TABLET EVERY OTHER DAY AS DIRECTED 1/29/18  Yes Kathy Álvarez MD   sotalol (BETAPACE) 80 MG tablet TAKE ONE-HALF (1/2) TABLET TWICE A DAY  Patient taking differently: TAKE ONE TABLET TWICE A DAY 12/29/17  Yes Ashu Purdy MD   spironolactone (ALDACTONE) 25 MG tablet TAKE 1 TABLET EVERY MORNING 9/11/17  Yes Kathy Álvarez MD   aspirin 81 MG tablet Take 81 mg by mouth every morning    Yes Historical Provider, MD   COLCRYS 0.6 MG tablet Take 1 tablet by mouth daily for 15 days 11/12/19 11/27/19  Kathy Álvarez MD   nitroGLYCERIN (NITROSTAT) 0.4 MG SL tablet Place 0.4 mg under the tongue 6/1/18   Historical Provider, MD   docusate sodium (COLACE) 100 MG capsule Take 100 mg by mouth every morning     Historical Provider, MD        Allergies:     Atorvastatin; Ciprofloxacin; Other; Penicillins; Simvastatin; Statins; and Sulfa antibiotics    Social History:     Tobacco:    reports that she quit smoking about 21 years ago. She has a 5.00 pack-year smoking history. She has never used smokeless tobacco.  Alcohol:      reports no history of alcohol use. Drug Use:  reports no history of drug use.     Family History:     Family History   Problem Relation Age of Onset    Heart Disease Father     Stroke Father     Hypertension Father     Heart Failure Mother     Breast Cancer Neg Hx     Cancer Neg Hx     Colon Cancer Neg Hx     Diabetes Neg Hx     Eclampsia Neg Hx     Ovarian Cancer Neg Hx      Labor Neg Hx     Spont Abortions Neg Hx        Review of Systems:     Positive and Negative as described in HPI. Review of Systems   Constitutional: Positive for diaphoresis and fatigue. Negative for chills and fever. HENT: Negative for congestion and hearing loss. Respiratory: Positive for shortness of breath. Negative for cough, wheezing and stridor. Cardiovascular: Positive for palpitations. Negative for chest pain and leg swelling. Gastrointestinal: Negative for abdominal pain, blood in stool, constipation, diarrhea, nausea and vomiting. Genitourinary: Negative for dysuria and frequency. Musculoskeletal: Negative for myalgias. Skin: Negative for rash. Neurological: Negative for dizziness, seizures and headaches. Psychiatric/Behavioral: The patient is not nervous/anxious. Physical Exam:   BP (!) 95/46   Pulse 60   Temp 98.2 °F (36.8 °C) (Oral)   Resp 14   Ht 5' 3\" (1.6 m)   Wt 170 lb 13.7 oz (77.5 kg)   SpO2 98%   BMI 30.27 kg/m²   Temp (24hrs), Av.9 °F (37.2 °C), Min:98.2 °F (36.8 °C), Max:99.6 °F (37.6 °C)    No results for input(s): POCGLU in the last 72 hours. No intake or output data in the 24 hours ending 20 0452    Physical Exam  Vitals signs and nursing note reviewed. Constitutional:       General: She is not in acute distress. Appearance: She is well-developed. She is ill-appearing (Fatigued appearing). She is not diaphoretic. HENT:      Head: Normocephalic and atraumatic. Right Ear: Hearing normal.      Left Ear: Hearing normal.      Nose: Nose normal. No rhinorrhea. Eyes:      General: Lids are normal.      Extraocular Movements:      Right eye: Normal extraocular motion. Left eye: Normal extraocular motion. Conjunctiva/sclera: Conjunctivae normal.      Right eye: Right conjunctiva is not injected.       Left eye: Left conjunctiva is not injected. Pupils: Pupils are equal, round, and reactive to light. Pupils are equal.      Right eye: Pupil is reactive. Left eye: Pupil is reactive. Neck:      Musculoskeletal: Neck supple. Thyroid: No thyromegaly. Vascular: No carotid bruit. Trachea: Trachea and phonation normal. No tracheal deviation. Cardiovascular:      Rate and Rhythm: Normal rate and regular rhythm. Pulses: Normal pulses. Heart sounds: Murmur present. Systolic murmur present with a grade of 3/6. Comments: There is radiation to the left carotid which found decreases with advancement up the neck. Pulmonary:      Effort: Accessory muscle usage present. No respiratory distress. Breath sounds: No stridor. Examination of the right-lower field reveals decreased breath sounds. Examination of the left-lower field reveals decreased breath sounds. Decreased breath sounds and rales present. No rhonchi. Abdominal:      General: Bowel sounds are normal. There is no distension. Palpations: Abdomen is soft. There is no mass. Tenderness: There is no tenderness. There is no guarding. Musculoskeletal:         General: No tenderness. Right lower le+ Edema present. Left lower le+ Edema present. Skin:     General: Skin is warm and dry. Findings: No erythema, lesion or rash. Neurological:      General: No focal deficit present. Mental Status: She is alert and oriented to person, place, and time. She is not disoriented. GCS: GCS eye subscore is 4. GCS verbal subscore is 5. GCS motor subscore is 6. Cranial Nerves: No cranial nerve deficit. Motor: Motor function is intact. Coordination: Coordination is intact. Psychiatric:         Attention and Perception: Attention normal.         Speech: Speech normal.         Behavior: Behavior normal. Behavior is cooperative.          Cognition and Memory: Cognition normal.         Investigations:      Laboratory Testing:  Recent Results (from the past 24 hour(s))   CBC Auto Differential    Collection Time: 01/03/20  9:07 PM   Result Value Ref Range    WBC 11.2 3.5 - 11.3 k/uL    RBC 3.81 (L) 3.95 - 5.11 m/uL    Hemoglobin 12.7 11.9 - 15.1 g/dL    Hematocrit 38.5 36.3 - 47.1 %    .0 82.6 - 102.9 fL    MCH 33.3 25.2 - 33.5 pg    MCHC 33.0 28.4 - 34.8 g/dL    RDW 12.1 11.8 - 14.4 %    Platelets 746 206 - 832 k/uL    MPV 9.7 8.1 - 13.5 fL    NRBC Automated 0.0 0.0 per 100 WBC    Differential Type NOT REPORTED     Seg Neutrophils 86 (H) 36 - 65 %    Lymphocytes 7 (L) 24 - 43 %    Monocytes 6 3 - 12 %    Eosinophils % 1 1 - 4 %    Basophils 0 0 - 2 %    Immature Granulocytes 0 0 %    Segs Absolute 9.50 (H) 1.50 - 8.10 k/uL    Absolute Lymph # 0.73 (L) 1.10 - 3.70 k/uL    Absolute Mono # 0.71 0.10 - 1.20 k/uL    Absolute Eos # 0.15 0.00 - 0.44 k/uL    Basophils Absolute <0.03 0.00 - 0.20 k/uL    Absolute Immature Granulocyte 0.04 0.00 - 0.30 k/uL    WBC Morphology NOT REPORTED     RBC Morphology NOT REPORTED     Platelet Estimate NOT REPORTED    Basic Metabolic Panel w/ Reflex to MG    Collection Time: 01/03/20  9:07 PM   Result Value Ref Range    Glucose 245 (H) 70 - 99 mg/dL    BUN 21 8 - 23 mg/dL    CREATININE 1.11 (H) 0.50 - 0.90 mg/dL    Bun/Cre Ratio NOT REPORTED 9 - 20    Calcium 8.4 (L) 8.6 - 10.4 mg/dL    Sodium 134 (L) 135 - 144 mmol/L    Potassium 4.5 3.7 - 5.3 mmol/L    Chloride 99 98 - 107 mmol/L    CO2 20 20 - 31 mmol/L    Anion Gap 15 9 - 17 mmol/L    GFR Non-African American 47 (L) >60 mL/min    GFR  57 (L) >60 mL/min    GFR Comment          GFR Staging NOT REPORTED    Brain Natriuretic Peptide    Collection Time: 01/03/20  9:07 PM   Result Value Ref Range    Pro-BNP 2,659 (H) <300 pg/mL    BNP Interpretation Pro-BNP Reference Range:    APTT    Collection Time: 01/03/20  9:07 PM   Result Value Ref Range    PTT 29.6 20.5 - 30.5 sec   Protime-INR    Collection Time: 01/03/20  9:07 PM   Result Value Ref Range    Protime 29.3 (H) 9.0 - 12.0 sec    INR 3.0    Troponin    Collection Time: 01/03/20  9:07 PM   Result Value Ref Range    Troponin, High Sensitivity 16 (H) 0 - 14 ng/L    Troponin T NOT REPORTED <0.03 ng/mL    Troponin Interp NOT REPORTED    Troponin    Collection Time: 01/03/20 10:05 PM   Result Value Ref Range    Troponin, High Sensitivity 15 (H) 0 - 14 ng/L    Troponin T NOT REPORTED <0.03 ng/mL    Troponin Interp NOT REPORTED    Urinalysis, reflex to microscopic    Collection Time: 01/03/20 11:04 PM   Result Value Ref Range    Color, UA YELLOW YELLOW    Turbidity UA CLEAR CLEAR    Glucose, Ur NEGATIVE NEGATIVE    Bilirubin Urine NEGATIVE NEGATIVE    Ketones, Urine NEGATIVE NEGATIVE    Specific Gravity, UA 1.010 1.005 - 1.030    Urine Hgb NEGATIVE NEGATIVE    pH, UA 6.0 5.0 - 8.0    Protein, UA NEGATIVE NEGATIVE    Urobilinogen, Urine Normal Normal    Nitrite, Urine NEGATIVE NEGATIVE    Leukocyte Esterase, Urine LARGE (A) NEGATIVE    Urinalysis Comments NOT REPORTED    Microscopic Urinalysis    Collection Time: 01/03/20 11:04 PM   Result Value Ref Range    -          WBC, UA 20 TO 50 0 - 5 /HPF    RBC, UA 5 TO 10 0 - 4 /HPF    Casts UA  0 - 8 /LPF     2 TO 5 HYALINE Reference range defined for non-centrifuged specimen. Crystals UA NOT REPORTED None /HPF    Epithelial Cells UA 0 TO 2 0 - 5 /HPF    Renal Epithelial, Urine NOT REPORTED 0 /HPF    Bacteria, UA NOT REPORTED None    Mucus, UA NOT REPORTED None    Trichomonas, UA NOT REPORTED None    Amorphous, UA NOT REPORTED None    Other Observations UA NOT REPORTED NOT REQ.     Yeast, UA NOT REPORTED None   Troponin    Collection Time: 01/04/20 12:47 AM   Result Value Ref Range    Troponin, High Sensitivity 20 (H) 0 - 14 ng/L    Troponin T NOT REPORTED <0.03 ng/mL    Troponin Interp NOT REPORTED    EKG 12 Lead    Collection Time: 01/04/20  1:16 AM   Result Value Ref Range    Ventricular Rate 63 BPM    Atrial Rate 41 BPM    QRS Duration 148 ms prevent hypoglycemia  4. Combined systolic and diastolic congestive heart failure-at this time she does not seem to be fluid overloaded on exam, BNP is elevated however she is on Entresto which will elevate the labs. Continue with Entresto beta-blocker and Lasix, Spironolactone  5. Chronic kidney disease stage III-avoid nephrotoxins. 6. Coronary artery disease without angina-continue with aspirin, no statin secondary to adverse reaction/allergy. 7. Chronic atrial fibrillation-continue with Lopressor, holding sotalol at this time given prolonged QT, will allow for cardiology to evaluate. Received  Coumadin-pharmacy to dose. 8. GI prophylaxis  9. VT prophylaxis-on Coumadin- therapeutic    Consultations:   IP CONSULT TO CARDIOLOGY  IP CONSULT TO HOSPITALIST  PHARMACY TO DOSE WARFARIN     Patient is admitted as inpatient status because of co-morbidities listed above, severity of signs and symptoms as outlined, requirement for current medical therapies and most importantly because of direct risk to patient if care not provided in a hospital setting.     SYBIL Wright - CNP  1/4/2020  4:52 AM    Copy sent to Dr. Jose Abbasi MD

## 2020-01-04 NOTE — ED PROVIDER NOTES
[facility]  Emergency Department  Faculty Attestation     I performed a history and physical examination of the patient and discussed management with the resident. I reviewed the residents note and agree with the documented findings and plan of care. Any areas of disagreement are noted on the chart. I was personally present for the key portions of any procedures. I have documented in the chart those procedures where I was not present during the key portions. I have reviewed the emergency nurses triage note. I agree with the chief complaint, past medical history, past surgical history, allergies, medications, social and family history as documented unless otherwise noted below. For Physician Assistant/ Nurse Practitioner cases/documentation I have personally evaluated this patient and have completed at least one if not all key elements of the E/M (history, physical exam, and MDM). Additional findings are as noted.       Primary Care Physician:  Ernie Khan MD    Screenings:  [unfilled]    [address] [place] [address]       Chief Complaint   Patient presents with    Shortness of Breath     Pt calls EMS for SOB, after her ACD fired, per Yonis Cronin, pt had a run of Vtach       RECENT VITALS:   Temp: 99.6 °F (37.6 °C),  Pulse: 63, Resp: 18, BP: 111/60    LABS:  Labs Reviewed   CBC WITH AUTO DIFFERENTIAL - Abnormal; Notable for the following components:       Result Value    RBC 3.81 (*)     Seg Neutrophils 86 (*)     Lymphocytes 7 (*)     Segs Absolute 9.50 (*)     Absolute Lymph # 0.73 (*)     All other components within normal limits   URINE CULTURE   BASIC METABOLIC PANEL W/ REFLEX TO MG FOR LOW K   BRAIN NATRIURETIC PEPTIDE   APTT   PROTIME-INR   URINALYSIS   TROPONIN   TROPONIN       Radiology  XR CHEST STANDARD (2 VW)    (Results Pending)       EKG:    EKG Interpretation    Interpreted by me    Rhythm: Paced  Rate: normal  Axis: normal  Ectopy: none  Conduction: normal  ST Segments: no acute change  T Waves: Inversion high lateral  Q Waves: Anterior and inferior    Clinical Impression: Paced rhythm, 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 They were called for feeling ill, likely short of breath and sweating, then her defibrillator went off. She has had this in place for some time and multiple prior defibrillations. She felt a little bit warm medics noticed a fever. No vomiting. She did have diarrhea but this is after kale. No chest pain. No stroke symptoms. She is not known to her cardiac history or her medication history. They found a 12 beat run of V. tach and started amiodarone and since then she feels better. On exam she is in no distress vital signs are normal.  Skin is warm. There is mild pallor. There is bilateral 1+ symmetrical edema without cords. Impression is cardiac arrhythmia, V. tach versus other cause. Plan is cardiac monitor, EKG, interrogate ICD, chest x-ray, laboratory studies, assess for infection due to fever, anticipate admission with cardiology consultation. [facility].  Damien Fink MD, [facility]  Attending Emergency  Physician                Manuela Lima MD  01/03/20 2034       Manuela Lima MD  01/03/20 2123

## 2020-01-04 NOTE — PROGRESS NOTES
Paged on call NP [facility], will be over to see new admission.

## 2020-01-04 NOTE — ED NOTES
Pt resting on stretcher, no respiratory distress noted, pt updated on plan of care, will continue to monitor, call light in reach.        Sera Vera RN  01/03/20 2521

## 2020-01-04 NOTE — PROGRESS NOTES
Pharmacy Note  Warfarin Consult follow-up      Recent Labs     01/04/20  0416   INR 2.4     Recent Labs     01/03/20  2107 01/04/20  0416   HGB 12.7 12.8   HCT 38.5 37.9    168       Significant Drug-Drug Interactions:  New warfarin drug-drug interactions: None  Discontinued drug-drug interactions: None  Current warfarin drug-drug interactions: Acetaminophen, amiodarone, aspirin, levothyroxine      Date INR Dose   1/3/2020 3.0 None   1/4/2020 2.4 3 mg            Notes:  INR 2.4, therapeutic on current home regimen. Will order warfarin 3 mg today per home regimen. Daily PT/INR while inpatient.      Johann Corea, PharmD, 1/4/2020 12:38 PM

## 2020-01-04 NOTE — CONSULTS
Atorvastatin; Ciprofloxacin; Other; Penicillins; Simvastatin; Statins; and Sulfa antibiotics    Social History:   reports that she quit smoking about 21 years ago. She has a 5.00 pack-year smoking history. She has never used smokeless tobacco. She reports that she does not drink alcohol or use drugs. Family History: family history includes Heart Disease in her father; Heart Failure in her mother; Hypertension in her father; Stroke in her father. No h/o sudden cardiac death. No for premature CAD    REVIEW OF SYSTEMS:    · Constitutional: there has been no unanticipated weight loss. There's been No change in energy level, No change in activity level. · Eyes: No visual changes or diplopia. No scleral icterus. · ENT: No Headaches  · Cardiovascular: Remaining as above  · Respiratory: No previous pulmonary problems, No cough  · Gastrointestinal: No abdominal pain. No change in bowel or bladder habits. · Genitourinary: No dysuria, trouble voiding, or hematuria. · Musculoskeletal:  No gait disturbance, No weakness or joint complaints. · Integumentary: No rash or pruritis. · Neurological: No headache, diplopia, change in muscle strength, numbness or tingling. No change in gait, balance, coordination, mood, affect, memory, mentation, behavior. · Psychiatric: No anxiety, or depression. · Endocrine: No temperature intolerance. No excessive thirst, fluid intake, or urination. No tremor. · Hematologic/Lymphatic: No abnormal bruising or bleeding, blood clots or swollen lymph nodes. · Allergic/Immunologic: No nasal congestion or hives.       PHYSICAL EXAM:      BP (!) 79/40   Pulse 60   Temp 98.8 °F (37.1 °C) (Oral)   Resp 18   Ht 5' 3\" (1.6 m)   Wt 170 lb 13.7 oz (77.5 kg)   SpO2 97%   BMI 30.27 kg/m²      Intake/Output Summary (Last 24 hours) at 1/4/2020 5481  Last data filed at 1/4/2020 8706  Gross per 24 hour   Intake 400.34 ml   Output 200 ml   Net 200.34 ml     Constitutional and General Appearance: alert, cooperative, no distress and appears stated age  [de-identified]: PERRL, no cervical lymphadenopathy. No masses palpable. Normal oral mucosa  Respiratory:  · Normal excursion and expansion without use of accessory muscles  · Resp Auscultation: Good respiratory effort. No for increased work of breathing. On auscultation: clear to auscultation bilaterally  Cardiovascular:  · The apical impulse is not displaced  · Heart tones are crisp and normal. regular S1 and S2.  · Jugular venous pulsation Normal  · The carotid upstroke is normal in amplitude and contour without delay or bruit  · Peripheral pulses are symmetrical and full     Abdomen:   · No masses or tenderness  · Bowel sounds present  Extremities:  ·  No Cyanosis or Clubbing  ·  Lower extremity edema: No  ·  Skin: Warm and dry  Neurological:  · Alert and oriented. · Moves all extremities well  · No abnormalities of mood, affect, memory, mentation, or behavior are noted    DATA:    Diagnostics:    EKG:    EKG Interpretation      Clinical Impression: Regular paced rhythm, suspect unspecified pacemaker failure        ECHO: 2D echo done in 2018 showed ejection fraction 25 to 30%, mild to moderate dilation of left atrium, moderate MR, TR, RVSP 50 to 55 mmHg. Labs:     CBC:   Recent Labs     01/03/20 2107 01/04/20 0416   WBC 11.2 14.5*   HGB 12.7 12.8   HCT 38.5 37.9    168     BMP:   Recent Labs     01/03/20 2107 01/04/20 0416   * 134*   K 4.5 3.8   CO2 20 22   BUN 21 20   CREATININE 1.11* 1.18*   LABGLOM 47* 44*   GLUCOSE 245* 173*     BNP: No results for input(s): BNP in the last 72 hours. PT/INR:   Recent Labs     01/03/20 2107 01/04/20 0416   PROTIME 29.3* 23.5*   INR 3.0 2.4     APTT:  Recent Labs     01/03/20 2107   APTT 29.6     CARDIAC ENZYMES:No results for input(s): CKTOTAL, CKMB, CKMBINDEX, TROPONINI in the last 72 hours.     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)    Female cystocele 2-3    Pessary maintenance    Breast cancer, left (HCC)    Personal history of breast cancer    GERD (gastroesophageal reflux disease)    Systolic CHF (HCC)    CKD (chronic kidney disease) stage 3, GFR 30-59 ml/min (HCC)    Idiopathic cardiomyopathy ([address]    HLD (hyperlipidemia)    Coronary artery disease involving native coronary artery of native heart without angina pectoris    Essential hypertension    Type 2 diabetes mellitus with neurologic complication (HCC)    Combined systolic and diastolic congestive heart failure (HCC)    Supratherapeutic INR    Hypertensive heart disease    Pacemaker    ICD (implantable cardioverter-defibrillator) in place    Acquired hypothyroidism    Chronic atrial fibrillation    Low back pain    Syncope and collapse    Chronic gout of right ankle    [facility]    ICD (implantable cardioverter-defibrillator) discharge         IMPRESSION and PLAN:    Ventricular tachycardia - ICD fired   Will start on amiodarone  Monitor and replace electrolytes  Telemetry monitoring  Keep Potassium greater than 4 and magnesium greater than 2    History of systolic congestive heart failure post status AICD placed  Continue Lasix 40 mg once a day , Aldactone 25 mg, Entresto 4951 mg twice daily, Toprol 25 mg,    History of hypothyroidism  Continue levothyroxine    History of permanent atrial fibrillation  On warfarin and sotalol  Sotalol on hold at present  Starting on amiodarone    Type 2 diabetes mellitus  Continue sliding scale    Discussed with patient and Nurse.     Electronically signed by Oliva Vazquez MD on 1/4/2020 at 7:52 AM  [address]        Attending Cardiologist Addendum: I have reviewed and performed the history, physical, subjective, objective, assessment, and plan with the resident/fellow and agree with the note. I performed the history and physical personally. I have made changes to the note above as needed. Thank you for allowing me to participate in the care of this patient, please do not hesitate to call if you have any questions. Valeriy Baca, [id number] [facility]  [facility]. [facility]  [phone]

## 2020-01-04 NOTE — ED PROVIDER NOTES
and sore throat. Eyes: Negative for pain and visual disturbance. Respiratory: Positive for cough and shortness of breath. Cardiovascular: Negative for chest pain. Gastrointestinal: Positive for nausea. Negative for abdominal pain and vomiting. Genitourinary: Positive for dysuria. Negative for hematuria. Musculoskeletal: Negative for back pain and neck pain. Skin: Negative for rash. Neurological: Negative for syncope, light-headedness and headaches. PHYSICAL EXAM   (up to 7 for level 4, 8 or more for level 5)      INITIAL VITALS:   /60   Pulse 63   Temp 99.6 °F (37.6 °C) (Oral)   Resp 17   Ht 5' 4\" (1.626 m)   Wt 164 lb (74.4 kg)   SpO2 95%   BMI 28.15 kg/m²     Physical Exam  Constitutional:       General: She is not in acute distress. Appearance: Normal appearance. She is well-developed. She is not ill-appearing, toxic-appearing or diaphoretic. HENT:      Head: Normocephalic and atraumatic. Mouth/Throat:      Pharynx: No oropharyngeal exudate or posterior oropharyngeal erythema. Eyes:      General:         Right eye: No discharge. Left eye: No discharge. Conjunctiva/sclera: Conjunctivae normal.      Pupils: Pupils are equal, round, and reactive to light. Neck:      Musculoskeletal: Normal range of motion and neck supple. Cardiovascular:      Rate and Rhythm: Normal rate and regular rhythm. Pulses: Normal pulses. Heart sounds: Normal heart sounds. No murmur. No friction rub. No gallop. Pulmonary:      Effort: Pulmonary effort is normal.      Breath sounds: Normal breath sounds. No wheezing or rales. Abdominal:      Palpations: Abdomen is soft. Tenderness: There is no tenderness. There is no guarding or rebound. Musculoskeletal: Normal range of motion. General: No tenderness. Right lower leg: No edema. Left lower leg: No edema. Skin:     General: Skin is warm and dry. Findings: No rash.    Neurological: consolidation. EKG  EKG Interpretation    Interpreted by emergency department physician    Rhythm: paced  Rate: normal  Axis: normal  Ectopy: none  Conduction: /  ST Segments: no acute change  T Waves: no acute change  Q Waves: none    Clinical Impression: paced rhythm    Ed Gilbert      All EKG's are interpreted by the Emergency Department Physician who either signs or Co-signs this chart in the absence of a cardiologist.    EMERGENCY DEPARTMENT COURSE:  ED Course as of Jan 03 2304 Fri Jan 03, 2020 2[address] Per [facility] report, -224 BPM from 7:22pm - 7:29pm, unsuccessfully did not pace out of, 10J shock, came out of VT.   7:47pm : 2 nonsustained VT epsidose lasting 12 seconds. [SF]   1033 Spoke with cardiology, recommend starting amnio drip, given 1 g of mag, repeat EKG in the morning, will see on the floor. Will admit to [facility]. [SF]      ED Course User Index  [SF] Ed Cuevas DO       PROCEDURES:    CONSULTS:  IP CONSULT TO CARDIOLOGY  IP CONSULT TO HOSPITALIST  PHARMACY TO DOSE WARFARIN        FINAL IMPRESSION      1. Defibrillator discharge    2. Ventricular tachycardia ([address]    3.  Shortness of breath          DISPOSITION / PLAN     DISPOSITION  DISPOSITION Admitted 01/03/2020 11:01:32 PM          PATIENT REFERREDTO:  Nell Sánchez MD  [address] [place] [id number]            DISCHARGE MEDICATIONS:  New Prescriptions    No medications on file       Yasir Panchal DO  PGY 2  Resident Physician Emergency Medicine  01/03/20 11:04 PM        (Please note that portions of this note were completed with a voice recognition program.Efforts were made to edit the dictations but occasionally words are mis-transcribed.)        Yasir Panchal DO  Resident  01/03/20 2654

## 2020-01-05 NOTE — PROGRESS NOTES
Pharmacy Note  Warfarin Consult follow-up      Recent Labs     01/05/20  0440   INR 2.3     Recent Labs     01/03/20  2107 01/04/20  0416   HGB 12.7 12.8   HCT 38.5 37.9    168       Significant Drug-Drug Interactions:  New warfarin drug-drug interactions: None  Discontinued drug-drug interactions: None  Current warfarin drug-drug interactions: Acetaminophen, amiodarone, aspirin, levothyroxine      Date INR Dose   1/3/2020 3.0 None   1/4/2020 2.4 3 mg   1/5/2020 2.3 2 mg       Notes:  INR 2.3, therapeutic. Will order warfarin 2 mg today per home regimen. Daily PT/INR while inpatient.      Miracle Snider, PharmD, 1/5/2020 11:21 AM

## 2020-01-05 NOTE — PROGRESS NOTES
[facility]   Progress Note                   Date:   1/5/2020  Patient name: Consuelo Cortes  Date of admission:  1/3/2020  8:29 PM  MRN:   [id number]  YOB: 1940  PCP: Itzel Smith MD    Reason for Admission:      Subjective:   Afebrile, blood pressure on lower side, heart rates in 60s    Had 3 episodes of V. tach 8 beats at 10 PM, 6 beats at 2 AM, 15 beats at 4 AM but asymptomatic  No complain of chest pain, nausea, vomiting, shortness of breath  Medications:   Scheduled Meds:   docusate sodium  100 mg Oral QAM    levothyroxine  50 mcg Oral Daily    metoprolol succinate  25 mg Oral Daily    sacubitril-valsartan  1 tablet Oral BID    spironolactone  25 mg Oral Daily    sodium chloride flush  10 mL Intravenous 2 times per day    aspirin  325 mg Oral Daily    insulin lispro  0-6 Units Subcutaneous TID WC    insulin lispro  0-3 Units Subcutaneous Nightly    warfarin (COUMADIN) daily dosing (placeholder)   Other RX Placeholder    midodrine  10 mg Oral TID WC    amiodarone  200 mg Oral Daily    furosemide  40 mg Oral Daily       Continuous Infusions:   dextrose         CBC:   Recent Labs     01/03/20 2107 01/04/20  0416   WBC 11.2 14.5*   HGB 12.7 12.8    168     BMP:    Recent Labs     01/03/20 2107 01/04/20 0416   * 134*   K 4.5 3.8   CL 99 98   CO2 20 22   BUN 21 20   CREATININE 1.11* 1.18*   GLUCOSE 245* 173*     Hepatic:   Recent Labs     01/04/20  0416   AST 18   ALT 12   BILITOT 0.89   ALKPHOS 70     Troponin: No results for input(s): TROPONINI in the last 72 hours. BNP: No results for input(s): BNP in the last 72 hours.   Lipids:   Recent Labs     01/04/20  0416   CHOL 176   HDL 74     INR:   Recent Labs     01/03/20 2107 01/04/20 0416 01/05/20  0440   INR 3.0 2.4 2.3       Objective:   Vitals: BP (!) 99/45   Pulse 62   Temp 98.7 °F (37.1 °C) (Oral)   Resp 20   Ht 5' 3\" (1.6 m)   Wt 167 lb 5.3 oz (75.9 kg)   SpO2 97%   BMI 29.64 kg/m² General appearance: awake, alert, in no apparent respiratory distress   HEENT: Head: Normocephalic, no lesions, without obvious abnormality  Neck: no JVD  Lungs: clear to auscultation bilaterally, no basilar rales, no wheezing   Heart: regular rate and rhythm, S1, S2 normal, no murmur, click, rub or gallop  Abdomen: soft, non-tender; bowel sounds normal  Extremities: No LE edema  Neurologic: Mental status: Alert, oriented. Motor and sensory not done. 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.39)     Female cystocele 2-3     Pessary maintenance     Breast cancer, left (HCC)     Personal history of breast cancer     GERD (gastroesophageal reflux disease)     Systolic CHF (HCC)     CKD (chronic kidney disease) stage 3, GFR 30-59 ml/min (HCC)     Idiopathic cardiomyopathy (HCC)     HLD (hyperlipidemia)     Coronary artery disease involving native coronary artery of native heart without angina pectoris     Essential hypertension     Type 2 diabetes mellitus with neurologic complication (HCC)     Combined systolic and diastolic congestive heart failure (HCC)     Supratherapeutic INR     Hypertensive heart disease     Pacemaker     ICD (implantable cardioverter-defibrillator) in place     Acquired hypothyroidism     Chronic atrial fibrillation     Low back pain     Syncope and collapse     Chronic gout of right ankle     VT (ventricular tachycardia) ([facility]     ICD (implantable cardioverter-defibrillator) discharge     Defibrillator discharge      Assessment and Plan of Treatment:   1. Non Sustained Ventricular tachycardia - had ICD shocks.     Increase  Amiodarone to  200 mg BID  Monitor and replace electrolytes  Telemetry monitoring  Keep Potassium greater than 4 and

## 2020-01-05 NOTE — PLAN OF CARE
Problem: FALL RISK  Goal: Will remain free from falls  Description  Will remain free from falls     Will remain free from falls  Outcome: Ongoing     Problem: Falls - Risk of:  Goal: Will remain free from falls  Description  Will remain free from falls     Will remain free from falls  Outcome: Ongoing  Goal: Absence of physical injury  Description  Absence of physical injury  Outcome: Ongoing

## 2020-01-05 NOTE — PROGRESS NOTES
Patient experienced 5 beat run of vtach and returned to a paced rhythm. Patient is Daren Lake MD, notified. No new orders. Strip posted in chart.

## 2020-01-05 NOTE — PROGRESS NOTES
Patient experienced 8 beat run of vtach and returned to a paced rhythm. Patient is asymptomatic. Mik Flores MD, notified. No new orders. Strip posted in chart.

## 2020-01-05 NOTE — PROGRESS NOTES
Patient experienced 15 beat run of vtach and returned to a paced rhythm. Patient is asymptomatic. Kieran Jacobo MD, notified. No new orders. Strip posted in chart.

## 2020-01-05 NOTE — PROGRESS NOTES
Patient experienced 6 beat run of vtach and returned to a paced rhythm. Patient is asymptomatic. Hiwot Del Cid MD, notified. No new orders. Strip posted in chart.

## 2020-01-05 NOTE — PROGRESS NOTES
[address]    Progress Note    1/5/2020    6:38 PM    Name:   Magalys Plasencia  MRN:     [id number]     [place]:      [de-identified]   Room:   2019/2019-01  IP Day:  2  Admit Date:  1/3/2020  8:29 PM    PCP:   Antoine Amaya MD  Code Status:  Full Code    Subjective:     C/C:   Chief Complaint   Patient presents with    Shortness of Breath     Pt calls EMS for SOB, after her ACD fired, per Dorota Hough, pt had a run of Vtach      Interval History Status: improved. Patient was seen and examined this morning. Sitting up in bed in no apparent distress. Overnight events noted. Patient has been having intermittent short bursts of ventricular tachycardia and has been asymptomatic. So far no more discharges of his ICD since admission. Vital signs improved markedly with midodrine. Patient denies any chest pain. EKG obtained still shows prolonged QTC. Brief History:     Magalys Plasencia is a [de-identified] y.o. Non-/non  female who presents with Shortness of Breath (Pt calls EMS for SOB, after her ACD fired, per Dorota Hough, pt had a run of [place])   and is admitted to the hospital for the management of AICD discharge.      This is a pleasant 79-year-old female with a past medical history significant of atrial fibrillation coronary artery disease systolic heart failure/idiopathic cardiomyopathy with ICD diabetes hypertension, hypothyroidism who presented to emergency room for AICD firing.       Patient states that she has been feeling markedly fatigued for the past few days but is recovering from a UTI and associated it with that however today she had a generalized episode of \"shaking\" with associated shortness of breath and diaphoretic and she decided to call EMS and then developed  worsening of the shortness of breath and her defibrillator fired.   In route EMS did note there were multiple runs of ventricular tachycardia were not sustained and not aborted by defibrillator requiring repeat defibrillation. Review of Systems:     Constitutional:  negative for chills, fevers, sweats  Respiratory:  negative for cough, dyspnea on exertion, hemoptysis, shortness of breath, wheezing  Cardiovascular:  negative for chest pain, chest pressure/discomfort, lower extremity edema, palpitations  Gastrointestinal:  negative for abdominal pain, constipation, diarrhea, nausea, vomiting  Neurological:  negative for dizziness, headache    Medications: Allergies:     Allergies   Allergen Reactions    Atorvastatin Other (See Comments)     Muscle cramps      Ciprofloxacin Itching    Other     Penicillins Diarrhea    Simvastatin Other (See Comments)     Muscle cramps      Statins     Sulfa Antibiotics        Current Meds:   Scheduled Meds:    amiodarone  200 mg Oral BID    docusate sodium  100 mg Oral QAM    levothyroxine  50 mcg Oral Daily    metoprolol succinate  25 mg Oral Daily    sacubitril-valsartan  1 tablet Oral BID    spironolactone  25 mg Oral Daily    sodium chloride flush  10 mL Intravenous 2 times per day    aspirin  325 mg Oral Daily    insulin lispro  0-6 Units Subcutaneous TID WC    insulin lispro  0-3 Units Subcutaneous Nightly    warfarin (COUMADIN) daily dosing (placeholder)   Other RX Placeholder    midodrine  10 mg Oral TID WC    furosemide  40 mg Oral Daily     Continuous Infusions:    dextrose       PRN Meds: sodium chloride flush, potassium chloride **OR** potassium alternative oral replacement **OR** potassium chloride, magnesium sulfate, magnesium hydroxide, glucose, dextrose, glucagon (rDNA), dextrose, nitroGLYCERIN, acetaminophen    Data:     Past Medical History:   has a past medical history of Atrial fibrillation ([facility] Breast cancer, left breast ([facility] CAD (coronary artery disease), CHF (congestive heart failure) ([facility] Diabetes ([facility] Diabetes ([facility] Female bladder prolapse, GERD (gastroesophageal reflux disease), HLD (hyperlipidemia), Hypertension, Hypothyroid, Hypothyroidism, Idiopathic cardiomyopathy ([address] Kidney disease, Low back pain, Personal history of breast cancer, Type 2 diabetes mellitus with neurologic complication ([address] and Vision abnormalities. Social History:   reports that she quit smoking about 21 years ago. She has a 5.00 pack-year smoking history. She has never used smokeless tobacco. She reports that she does not drink alcohol or use drugs. Family History:   Family History   Problem Relation Age of Onset    Heart Disease Father     Stroke Father     Hypertension Father     Heart Failure Mother     Breast Cancer Neg Hx     Cancer Neg Hx     Colon Cancer Neg Hx     Diabetes Neg Hx     Eclampsia Neg Hx     Ovarian Cancer Neg Hx      Labor Neg Hx     Spont Abortions Neg Hx        Vitals:  BP (!) 85/44   Pulse 63   Temp 97.7 °F (36.5 °C) (Oral)   Resp 17   Ht 5' 3\" (1.6 m)   Wt 167 lb 5.3 oz (75.9 kg)   SpO2 96%   BMI 29.64 kg/m²   Temp (24hrs), Av.2 °F (36.8 °C), Min:97.5 °F (36.4 °C), Max:98.8 °F (37.1 °C)    Recent Labs     20  0820 20  1109 20  1213 20  1643   POCGLU 75 118* 109* 96       I/O (24Hr):     Intake/Output Summary (Last 24 hours) at 2020 1838  Last data filed at 2020 1500  Gross per 24 hour   Intake 800 ml   Output 1950 ml   Net -1150 ml       Labs:  Hematology:  Recent Labs     20  21020  0416 20  0440   WBC 11.2 14.5* 10.1   RBC 3.81* 3.87* 3.61*   HGB 12.7 12.8 12.0   HCT 38.5 37.9 37.1   .0 97.9 102.8   MCH 33.3 33.1 33.2   MCHC 33.0 33.8 32.3   RDW 12.1 12.3 12.4    168 181   MPV 9.7 9.8 10.1   INR 3.0 2.4 2.3     Chemistry:  Recent Labs     20  0047 20  0416 20  1457 20  0440   *  --   --  134*  --  134*   K 4.5  --   --  3.8  --  4.3   CL 99  --   --  98  --  101   CO2 20  --   --  22  --  20   GLUCOSE 245*  --   --  173*  --  61*   BUN 21  -- --  20  --  19   CREATININE 1.11*  --   --  1.18*  --  1.06*   MG  --   --   --  2.2  --  2.2   ANIONGAP 15  --   --  14  --  13   LABGLOM 47*  --   --  44*  --  50*   GFRAA 57*  --   --  53*  --  >60   CALCIUM 8.4*  --   --  8.9  --  8.5*   PHOS  --   --   --   --   --  2.2*   PROBNP 2,659*  --   --   --   --   --    TROPHS 16*   < > 20* 16*  --  17*   LACTACIDWB  --   --   --   --  1.3  --     < > = values in this interval not displayed. Recent Labs     01/04/20  0047 01/04/20  0416  01/04/20  1642 01/04/20  2048 01/05/20  0440 01/05/20  0820 01/05/20  1109 01/05/20  1213 01/05/20  1643   PROT  --  6.7  --   --   --   --   --   --   --   --    LABALBU  --  3.7  --   --   --  3.3*  --   --   --   --    LABA1C 6.1*  --   --   --   --   --   --   --   --   --    AST  --  18  --   --   --   --   --   --   --   --    ALT  --  12  --   --   --   --   --   --   --   --    ALKPHOS  --  70  --   --   --   --   --   --   --   --    BILITOT  --  0.89  --   --   --   --   --   --   --   --    CHOL  --  176  --   --   --   --   --   --   --   --    HDL  --  74  --   --   --   --   --   --   --   --    LDLCHOLESTEROL  --  90  --   --   --   --   --   --   --   --    CHOLHDLRATIO  --  2.4  --   --   --   --   --   --   --   --    TRIG  --  59  --   --   --   --   --   --   --   --    VLDL  --  NOT REPORTED  --   --   --   --   --   --   --   --    POCGLU  --   --    < > 95 92  --  75 118* 109* 96    < > = values in this interval not displayed.      ABG:No results found for: POCPH, PHART, PH, POCPCO2, LGU7MNW, PCO2, POCPO2, PO2ART, PO2, POCHCO3, CJP2GKY, HCO3, NBEA, PBEA, BEART, BE, THGBART, THB, CTV6RQI, QHRH9OUU, J4FBZJOG, O2SAT, FIO2  Lab Results   Component Value Date/Time    SPECIAL NOT REPORTED 01/03/2020 11:03 PM     Lab Results   Component Value Date/Time    CULTURE NO SIGNIFICANT GROWTH 01/03/2020 11:03 PM       Radiology:  Xr Chest Standard (2 Vw)    Result Date: 1/3/2020  Stable to slight interval increase in the

## 2020-01-06 NOTE — PROGRESS NOTES
[facility]  Progress Note                   Date:   1/6/2020  Patient name: Matilda Rose  Date of admission:  1/3/2020  8:29 PM  MRN:   [id number]  YOB: 1940  PCP: Oren Jean MD    Reason for Admission: VT (ventricular tachycardia) ([facility] [I47.2]    Subjective:       Clinical Changes /Abnormalities:Pt. Seen & examined alone in room lying quietly in bed. States she is feeling better today. Denies any dizziness, lighththeadedness, or palpitations. Denies CP or SOB. Tele reviewed. Several short runs (<8 beats) NSVT overnight - completely asymptomatic. Review of Systems    Medications:   Scheduled Meds:   warfarin  2 mg Oral Once    amiodarone  200 mg Oral BID    docusate sodium  100 mg Oral QAM    levothyroxine  50 mcg Oral Daily    metoprolol succinate  25 mg Oral Daily    sacubitril-valsartan  1 tablet Oral BID    spironolactone  25 mg Oral Daily    sodium chloride flush  10 mL Intravenous 2 times per day    aspirin  325 mg Oral Daily    insulin lispro  0-6 Units Subcutaneous TID WC    insulin lispro  0-3 Units Subcutaneous Nightly    warfarin (COUMADIN) daily dosing (placeholder)   Other RX Placeholder    midodrine  10 mg Oral TID WC    furosemide  40 mg Oral Daily     Continuous Infusions:   dextrose       CBC:   Recent Labs     01/03/20 2107 01/04/20 0416 01/05/20  0440   WBC 11.2 14.5* 10.1   HGB 12.7 12.8 12.0    168 181     BMP:    Recent Labs     01/04/20  0416 01/05/20  0440 01/06/20  0429   * 134* 133*   K 3.8 4.3 4.8   CL 98 101 101   CO2 22 20 20   BUN 20 19 19   CREATININE 1.18* 1.06* 1.01*   GLUCOSE 173* 61* 77     Hepatic:  Recent Labs     01/04/20  0416   AST 18   ALT 12   BILITOT 0.89   ALKPHOS 70     Troponin:   Recent Labs     01/04/20  0047 01/04/20  0416 01/05/20  0440   TROPHS 20* 16* 17*     BNP: No results for input(s): BNP in the last 72 hours.   Lipids:   Recent Labs     01/04/20 0416   CHOL 176   HDL 74     INR: discharge     Defibrillator discharge      Plan of Treatment:   1. Will give 1gm Mg+ today. Feeling much better and less ectopy/NSVT. OK for discharge home from cardiology standpoint this afternoon and advised to f/u with [address] [place] [address] Cardiology in 1-2 weeks (established patient). Continue present medications (Amio, Lasix, BB, Entresto, Aldactone, Coumadin).     Electronically signed by SYBIL Esparza CNP on 1/6/2020 at 10:34 AM  [address]  [phone]

## 2020-01-06 NOTE — CARE COORDINATION
Transitional planning-talked with patient. Plan is to go home with her . Not wanting any home care at this time.
home?:  No  Social Support  Durable Medical Equipment  Functional Review  Ability to seek help/take action for Emergent/Urgent situations i.e. fire, crime, inclement weather or health crisis. :  Independent  Ability handle personal hygiene needs (bathing/dressing/grooming): Independent  Ability to manage medications: Independent  Ability to prepare food:  Independent  Ability to maintain home (clean home, laundry): Independent  Ability to drive and/or has transportation:  Independent  Ability to do shopping:  Independent  Ability to manage finances:   Independent  Is patient able to live independently?:  Yes  Hearing and Vision  Care Transitions Interventions                                 Follow Up  Future Appointments   Date Time Provider Russ Cardona   1/13/2020  2:45 PM Jean Pierre Bernstein MD [address], RN

## 2020-01-06 NOTE — PLAN OF CARE
Problem: Falls - Risk of:  Goal: Will remain free from falls  Description  Will remain free from falls     Will remain free from falls  Outcome: Ongoing  Goal: Absence of physical injury  Description  Absence of physical injury  Outcome: Ongoing

## 2020-01-07 NOTE — PROGRESS NOTES
[address]    Progress Note    1/7/2020    5:05 PM    Name:   Isidra Snowden  MRN:     [id number]     [place]:      [de-identified]   Room:   2019/2019-01  IP Day:  4  Admit Date:  1/3/2020  8:29 PM    PCP:   Hoa Ibrahim MD  Code Status:  Full Code    Subjective:     C/C:   Chief Complaint   Patient presents with    Shortness of Breath     Pt calls EMS for SOB, after her ACD fired, per Lalito Mendosa, pt had a run of Vtach      Interval History Status: improved. Patient was seen and examined this morning. Sitting up in bed in no apparent distress. Overnight events noted. Patient still remains relatively hypotensive of Entresto and on 10 mg of midodrine. EKG obtained still shows prolonged QTC and patient has been experiencing NSVT, today's duration longer than the previous. Denies any chest pain, palpitation, diaphoresis, shortness of breath, fever or chills. Brief History:     Isidra Snowden is a [de-identified] y.o. Non-/non  female who presents with Shortness of Breath (Pt calls EMS for SOB, after her ACD fired, per Lalito Mendosa, pt had a run of [place])   and is admitted to the hospital for the management of AICD discharge.      This is a pleasant 76-year-old female with a past medical history significant of atrial fibrillation coronary artery disease systolic heart failure/idiopathic cardiomyopathy with ICD diabetes hypertension, hypothyroidism who presented to emergency room for AICD firing.       Patient states that she has been feeling markedly fatigued for the past few days but is recovering from a UTI and associated it with that however today she had a generalized episode of \"shaking\" with associated shortness of breath and diaphoretic and she decided to call EMS and then developed  worsening of the shortness of breath and her defibrillator fired.   In route EMS did note there were multiple runs of ventricular tachycardia were not sustained and not aborted by defibrillator requiring repeat defibrillation. Review of Systems:     Constitutional:  negative for chills, fevers, sweats  Respiratory:  negative for cough, dyspnea on exertion, hemoptysis, shortness of breath, wheezing  Cardiovascular:  negative for chest pain, chest pressure/discomfort, lower extremity edema, palpitations  Gastrointestinal:  negative for abdominal pain, constipation, diarrhea, nausea, vomiting  Neurological:  negative for dizziness, headache    Medications: Allergies:     Allergies   Allergen Reactions    Atorvastatin Other (See Comments)     Muscle cramps      Ciprofloxacin Itching    Other     Penicillins Diarrhea    Simvastatin Other (See Comments)     Muscle cramps      Statins     Sulfa Antibiotics        Current Meds:   Scheduled Meds:    amiodarone  200 mg Oral BID    docusate sodium  100 mg Oral QAM    levothyroxine  50 mcg Oral Daily    metoprolol succinate  25 mg Oral Daily    spironolactone  25 mg Oral Daily    sodium chloride flush  10 mL Intravenous 2 times per day    aspirin  325 mg Oral Daily    insulin lispro  0-6 Units Subcutaneous TID WC    insulin lispro  0-3 Units Subcutaneous Nightly    warfarin (COUMADIN) daily dosing (placeholder)   Other RX Placeholder    midodrine  10 mg Oral TID WC    furosemide  40 mg Oral Daily     Continuous Infusions:    dextrose       PRN Meds: sodium chloride flush, potassium chloride **OR** potassium alternative oral replacement **OR** potassium chloride, magnesium sulfate, magnesium hydroxide, glucose, dextrose, glucagon (rDNA), dextrose, nitroGLYCERIN, acetaminophen    Data:     Past Medical History:   has a past medical history of Atrial fibrillation ([facility] Breast cancer, left breast ([facility] CAD (coronary artery disease), CHF (congestive heart failure) ([facility] Diabetes ([facility] Diabetes ([facility] Female bladder prolapse, GERD (gastroesophageal reflux disease), HLD (hyperlipidemia), Hypertension, 2.2* 2.6 2.9   TROPHS 17*  --   --      Recent Labs     01/05/20  0440  01/06/20  0429  01/06/20  1658 01/06/20  1933 01/07/20  0737 01/07/20  1006 01/07/20  1033 01/07/20  1202 01/07/20  1642   LABALBU 3.3*  --  3.3*  --   --   --   --  3.5  --   --   --    POCGLU  --    < >  --    < > 154* 189* 84  --  238* 204* 144*    < > = values in this interval not displayed. ABG:No results found for: POCPH, PHART, PH, POCPCO2, VOF9CAV, PCO2, POCPO2, PO2ART, PO2, POCHCO3, DXK3TSA, HCO3, NBEA, PBEA, BEART, BE, THGBART, THB, IVP6CKG, WTYW7GEW, I8CDCDDK, O2SAT, FIO2  Lab Results   Component Value Date/Time    SPECIAL NOT REPORTED 01/03/2020 11:03 PM     Lab Results   Component Value Date/Time    CULTURE NO SIGNIFICANT GROWTH 01/03/2020 11:03 PM       Radiology:  Xr Chest Standard (2 Vw)    Result Date: 1/3/2020  Stable to slight interval increase in the interstitial prominence bilaterally, right greater than left. This may be technical or due to mild venous congestion/edema. No consolidation.        Physical Examination:        General appearance:  alert, cooperative and no distress  Mental Status:  oriented to person, place and time and normal affect  Lungs:  clear to auscultation bilaterally, normal effort  Heart:  regular rate and irregular rhythm with positive murmur  Abdomen:  soft, nontender, nondistended, normal bowel sounds, no masses, hepatomegaly, splenomegaly  Extremities:  no edema, redness, tenderness in the calves  Skin:  no gross lesions, rashes, induration    Assessment:        Hospital Problems           Last Modified POA    * (Principal) ICD (implantable cardioverter-defibrillator) discharge 1/4/2020 Yes    CKD (chronic kidney disease) stage 3, GFR 30-59 ml/min ([facility] 1/4/2020 Yes    HLD (hyperlipidemia) 1/4/2020 Yes    Overview Signed 8/11/2014  3:25 PM by Mikayla Lobo RN     unable to tolerate meds         Coronary artery disease involving native coronary artery of native heart without angina pectoris

## 2020-01-07 NOTE — PROGRESS NOTES
Systems:     Constitutional:  negative for chills, fevers, sweats  Respiratory:  negative for cough, dyspnea on exertion, hemoptysis, shortness of breath, wheezing  Cardiovascular:  negative for chest pain, chest pressure/discomfort, lower extremity edema, palpitations  Gastrointestinal:  negative for abdominal pain, constipation, diarrhea, nausea, vomiting  Neurological:  negative for dizziness, headache    Medications: Allergies:     Allergies   Allergen Reactions    Atorvastatin Other (See Comments)     Muscle cramps      Ciprofloxacin Itching    Other     Penicillins Diarrhea    Simvastatin Other (See Comments)     Muscle cramps      Statins     Sulfa Antibiotics        Current Meds:   Scheduled Meds:    amiodarone  200 mg Oral BID    docusate sodium  100 mg Oral QAM    levothyroxine  50 mcg Oral Daily    metoprolol succinate  25 mg Oral Daily    spironolactone  25 mg Oral Daily    sodium chloride flush  10 mL Intravenous 2 times per day    aspirin  325 mg Oral Daily    insulin lispro  0-6 Units Subcutaneous TID WC    insulin lispro  0-3 Units Subcutaneous Nightly    warfarin (COUMADIN) daily dosing (placeholder)   Other RX Placeholder    midodrine  10 mg Oral TID WC    furosemide  40 mg Oral Daily     Continuous Infusions:    dextrose       PRN Meds: sodium chloride flush, potassium chloride **OR** potassium alternative oral replacement **OR** potassium chloride, magnesium sulfate, magnesium hydroxide, glucose, dextrose, glucagon (rDNA), dextrose, nitroGLYCERIN, acetaminophen    Data:     Past Medical History:   has a past medical history of [facility] fibrillation [facility] Breast cancer, left breast ([facility] CAD (coronary artery disease), CHF (congestive heart failure) ([facility] Diabetes ([facility] Diabetes ([facility] Female bladder prolapse, GERD (gastroesophageal reflux disease), HLD (hyperlipidemia), Hypertension, Hypothyroid, Hypothyroidism, Idiopathic cardiomyopathy ([facility] Kidney disease, Low back pain, Personal history of breast cancer, Type 2 diabetes mellitus with neurologic complication ([address] and Vision abnormalities. Social History:   reports that she quit smoking about 21 years ago. She has a 5.00 pack-year smoking history. She has never used smokeless tobacco. She reports that she does not drink alcohol or use drugs. Family History:   Family History   Problem Relation Age of Onset    Heart Disease Father     Stroke Father     Hypertension Father     Heart Failure Mother     Breast Cancer Neg Hx     Cancer Neg Hx     Colon Cancer Neg Hx     Diabetes Neg Hx     Eclampsia Neg Hx     Ovarian Cancer Neg Hx      Labor Neg Hx     Spont Abortions Neg Hx        Vitals:  BP (!) 104/52   Pulse 63   Temp 97.9 °F (36.6 °C) (Axillary)   Resp 19   Ht 5' 3\" (1.6 m)   Wt 169 lb 12.1 oz (77 kg)   SpO2 98%   BMI 30.07 kg/m²   Temp (24hrs), Av.7 °F (36.5 °C), Min:97.2 °F (36.2 °C), Max:98.4 °F (36.9 °C)    Recent Labs     20  0855 20  1157 20  1658   POCGLU 141* 113* 179* 154*       I/O (24Hr):     Intake/Output Summary (Last 24 hours) at 2020 191  Last data filed at 2020 1823  Gross per 24 hour   Intake 910 ml   Output 1600 ml   Net -690 ml       Labs:  Hematology:  Recent Labs     20  0416 20  0440 20  0429   WBC 11.2 14.5* 10.1  --    RBC 3.81* 3.87* 3.61*  --    HGB 12.7 12.8 12.0  --    HCT 38.5 37.9 37.1  --    .0 97.9 102.8  --    MCH 33.3 33.1 33.2  --    MCHC 33.0 33.8 32.3  --    RDW 12.1 12.3 12.4  --     168 181  --    MPV 9.7 9.8 10.1  --    INR 3.0 2.4 2.3 2.6     Chemistry:  Recent Labs     20  0047 20  0416 20  1457 20  0440 20  0429   *  --   --  134*  --  134* 133*   K 4.5  --   --  3.8  --  4.3 4.8   CL 99  --   --  98  --  101 101   CO2 20  --   --  22  --  20 20   GLUCOSE 245*  --   --  173*  --  61* 77   BUN 21  --   --  20 Results   Component Value Date/Time    CULTURE NO SIGNIFICANT GROWTH 01/03/2020 11:03 PM       Radiology:  Xr Chest Standard (2 Vw)    Result Date: 1/3/2020  Stable to slight interval increase in the interstitial prominence bilaterally, right greater than left. This may be technical or due to mild venous congestion/edema. No consolidation. Physical Examination:        General appearance:  alert, cooperative and no distress  Mental Status:  oriented to person, place and time and normal affect  Lungs:  clear to auscultation bilaterally, normal effort  Heart:  regular rate and irregular rhythm with positive murmur  Abdomen:  soft, nontender, nondistended, normal bowel sounds, no masses, hepatomegaly, splenomegaly  Extremities:  no edema, redness, tenderness in the calves  Skin:  no gross lesions, rashes, induration    Assessment:        Hospital Problems           Last Modified POA    * (Principal) ICD (implantable cardioverter-defibrillator) discharge 1/4/2020 Yes    CKD (chronic kidney disease) stage 3, GFR 30-59 ml/min ([facility] 1/4/2020 Yes    HLD (hyperlipidemia) 1/4/2020 Yes    Overview Signed 8/11/2014  3:25 PM by Andrew Cedillo RN     unable to tolerate meds         Coronary artery disease involving native coronary artery of native heart without angina pectoris 1/4/2020 Yes    Overview Signed 8/11/2014  3:25 PM by Andrew Cedillo RN     non-obstructive         Type 2 diabetes mellitus with neurologic complication ([facility] 2/6/0004 Yes    Combined systolic and diastolic congestive heart failure ([address] 1/4/2020 Yes    Acquired hypothyroidism 1/4/2020 Yes    Chronic atrial fibrillation 1/4/2020 Yes    VT (ventricular tachycardia) ([facility] 1/4/2020 Yes    Defibrillator discharge 1/4/2020 Yes          Plan:        1. Discussed with cardiologist attending who indicated with persistent hypotension needing midodrine, Entresto is contraindicated and therefore that should be discontinued for now  2.  Sotalol remains on hold since that was the etiology for the QTc prolongation which led to the ventricular tachycardia leading to the ICD discharge. Patient currently being managed with amiodarone that is been coordinated by cardiology consult  3. Continue maintaining patient on midodrine with intermittent normal saline boluses for hypotension until the sotalol wears off  4. For the CKD, avoid all nephrotoxins including ACE inhibitors, ARB's, IV contrast media dye, Bactrim and NSAIDs, renally dose all medications and maintain on renal diet  5. Optimize blood sugar control with SSI as needed and current regimen. Avoid sulfonylureas due to recurrent hypoglycemic episodes,  6. GI/DVT prophylaxis  7. A.m. labs. Appreciate input by cardiology consult.     Princess Garland MD  1/6/2020  7:17 PM

## 2020-01-07 NOTE — PROGRESS NOTES
Pharmacy Note  Warfarin Consult follow-up    Recent Labs     01/07/20  0607   INR 3.6     Recent Labs     01/05/20  0440   HGB 12.0   HCT 37.1        Current warfarin drug-drug interactions: acetaminophen, amiodarone, aspirin, levothyroxine    Date INR Dose   1/3/2020 3.0 None   1/4/2020 2.4 3 mg   1/5/2020 2.3 2 mg   1/6/2020 2.6 2mg   1/7/2020 3.6 HOLD     Notes:   Significant increase in INR from 2.6 yesterday to 3.6 today; therefore will HOLD warfarin today on 1/7/2020. Daily PT/INR while inpatient.      Robbin Ahumada, RPh, CACP  Clinical Pharmacist Medication Management  1/7/2020  8:11 AM

## 2020-01-08 NOTE — PROGRESS NOTES
[address]    Progress Note    1/8/2020    5:51 PM    Name:   Matilda Rose  MRN:     [id number]     [place]:      [de-identified]   Room:   2019/2019-01  IP Day:  5  Admit Date:  1/3/2020  8:29 PM    PCP:   Oren Jean MD  Code Status:  Full Code    Subjective:     C/C:   Chief Complaint   Patient presents with    Shortness of Breath     Pt calls EMS for SOB, after her ACD fired, per Caitlin Mcintyre, pt had a run of Vtach     Interval History Status: improved. Patient seen and examined at bedside, no acute events overnight. Stable  Small run of NSVT overnight  Creat worse  BP on the lower side  Will be seen by EP today  Patient denies any chest pain, shortness of breath, chills, fevers, nausea or vomiting.   Patient vitals, labs and all providers notes were reviewed,from overnight shift and morning updates were noted and discussed with the nurse    Brief History:     Per my associate  Erniemichael Fernandez is a [de-identified] y.o. Non-/non  female who presents with Shortness of Breath (Pt calls EMS for SOB, after her ACD fired, per Caitlin Mcintyre, pt had a run of [address])   and is admitted to the hospital for the management of AICD discharge.      This is a pleasant [de-identified] female with a past medical history significant of atrial fibrillation coronary artery disease systolic heart failure/idiopathic cardiomyopathy with ICD diabetes hypertension, hypothyroidism who presented to emergency room for AICD firing.       Patient states that she has been feeling markedly fatigued for the past few days but is recovering from a UTI and associated it with that however today she had a generalized episode of \"shaking\" with associated shortness of breath and diaphoretic and she decided to call EMS and then developed  worsening of the shortness of breath and her defibrillator fired.  In route EMS did note there were multiple runs of ventricular tachycardia were not 01/08/20  0951 01/08/20  1226 01/08/20  1650   LABALBU 3.3*  --  3.5  --   --   --   --  3.6  --   --   --    POCGLU  --    < >  --    < > 204* 144* 159*  --  169* 123* 155*    < > = values in this interval not displayed. ABG:No results found for: POCPH, PHART, PH, POCPCO2, MMY7HHD, PCO2, POCPO2, PO2ART, PO2, POCHCO3, SCL1YJC, HCO3, NBEA, PBEA, BEART, BE, THGBART, THB, SXH7WQB, ZVPB4EWN, Q7MKOGSL, O2SAT, FIO2  Lab Results   Component Value Date/Time    SPECIAL NOT REPORTED 01/03/2020 11:03 PM     Lab Results   Component Value Date/Time    CULTURE NO SIGNIFICANT GROWTH 01/03/2020 11:03 PM       Radiology:  Xr Chest Standard (2 Vw)    Result Date: 1/3/2020  Stable to slight interval increase in the interstitial prominence bilaterally, right greater than left. This may be technical or due to mild venous congestion/edema. No consolidation.        Physical Examination:        General appearance:  alert, cooperative and no distress  Mental Status:  oriented to person, place and time and normal affect  Lungs:  clear to auscultation bilaterally, normal effort  Heart:  regular rate and rhythm, no murmur  Abdomen:  soft, nontender, nondistended, normal bowel sounds, no masses, hepatomegaly, splenomegaly  Extremities:  no edema, redness, tenderness in the calves  Skin:  no gross lesions, rashes, induration    Assessment:        Hospital Problems           Last Modified POA    * (Principal) ICD (implantable cardioverter-defibrillator) discharge 1/4/2020 Yes    CKD (chronic kidney disease) stage 3, GFR 30-59 ml/min ([facility] 1/4/2020 Yes    HLD (hyperlipidemia) 1/4/2020 Yes    Overview Signed 8/11/2014  3:25 PM by Mikayla Lobo RN     unable to tolerate meds         Coronary artery disease involving native coronary artery of native heart without angina pectoris 1/4/2020 Yes    Overview Signed 8/11/2014  3:25 PM by Mikayla Lobo RN     non-obstructive         Type 2 diabetes mellitus with neurologic complication [facility] 1/4/2020 Yes    Combined systolic and diastolic congestive heart failure ([facility] 1/4/2020 Yes    Acquired hypothyroidism 1/4/2020 Yes    Chronic atrial fibrillation 1/4/2020 Yes    VT (ventricular tachycardia) ([facility] 1/4/2020 Yes    Defibrillator discharge 1/4/2020 Yes          Plan:        I reviewed hospital course including labs, images and notes  Await EP consultant recommendations  Currently continue to be on amiodarone  Ordered echocardiogram  Worsening creatinine today, likely secondary to hypotension and hypoperfusion start gentle hydration  Agree with Midodrin  Avoid all nephrotoxic agents  Blood pressure and glycemic control  DVT and GI prophylaxis. Daily labs.   Discussed with the patient and the nurse    Celestina Stark MD  1/8/2020  5:51 PM

## 2020-01-08 NOTE — PROGRESS NOTES
Pharmacy Note  Warfarin Consult follow-up    Recent Labs     01/08/20  0508   INR 3.2     No results for input(s): HGB, HCT, PLT in the last 72 hours. Current warfarin drug-drug interactions: acetaminophen, amiodarone (new this admit), aspirin, levothyroxine    Date INR Dose   1/3/2020 3.0 None   1/4/2020 2.4 3 mg   1/5/2020 2.3 2 mg   1/6/2020 2.6 2mg   1/7/2020 3.6 HOLD   1/8/2020 3.2 0.5mg     Notes:   Give warfarin 0.5mg today to prevent too precipitous drop in INR. Will need reduction in warfarin regimen if amiodarone is continued on discharge. Daily PT/INR while inpatient.       Faustina Hudson RPh, CACP  Clinical Pharmacist Medication Management  1/8/2020  9:39 AM

## 2020-01-08 NOTE — PROGRESS NOTES
and stable BP    Electronically signed by SYBIL Goncalves CNP on 1/8/2020 at 9:20 [id number] [facility].  [phone]

## 2020-01-08 NOTE — CONSULTS
[facility]   Consult Note             Date:   1/8/2020  Patient name: Ronit Moran  Date of admission:  1/3/2020  8:29 PM  MRN:   [id number]  YOB: 1940    Indication for consult. Ventricular Tachycardia. CHIEF COMPLAINT:  My ICD fired. The patient is a [de-identified] y.o. Who presented to the hospital with ICD shocks. She is well known to Dr Estevan Jovel and has a history of persistent atrial fibrillation sp av node ablation and biventricular ICD implantation. She had multiple runs of ventricular tachycardia noted on the monitor. She was on entresto but this medication was stopped due to hypotension. She was put on amiodarone. I was consulted to render an opinion and help with her management. Past Medical History:   has a past medical history of Atrial fibrillation ([address] Breast cancer, left breast ([address] CAD (coronary artery disease), CHF (congestive heart failure) ([address] Diabetes ([address] Diabetes ([address] Female bladder prolapse, GERD (gastroesophageal reflux disease), HLD (hyperlipidemia), Hypertension, Hypothyroid, Hypothyroidism, Idiopathic cardiomyopathy ([address] Kidney disease, Low back pain, Personal history of breast cancer, Type 2 diabetes mellitus with neurologic complication ([address] and Vision abnormalities. Past Surgical History:   has a past surgical history that includes Breast biopsy (Left, 12/2/13); Breast lumpectomy; Cataract removal with implant (Bilateral); Atrial ablation surgery; Cardiac defibrillator placement; Coronary angioplasty; and Appendectomy. Home Medications:    Prior to Admission medications    Medication Sig Start Date End Date Taking?  Authorizing Provider   metoprolol succinate (TOPROL XL) 25 MG extended release tablet TAKE 1 TABLET DAILY 10/9/19  Yes Reji Abbasi MD   glimepiride (AMARYL) 2 MG tablet TAKE 1 TABLET EVERY MORNING BEFORE BREAKFAST 10/8/19  Yes Reji Abbasi MD   levothyroxine (SYNTHROID) 50 MCG tablet Take 1 tablet by mouth Daily 5/17/19  Yes Itzel Smith MD   sacubitril-valsartan (ENTRESTO) 24-26 MG per tablet Take 1 tablet by mouth 2 times daily   Yes Historical Provider, MD   furosemide (LASIX) 40 MG tablet TAKE 1 TABLET TWICE A DAY 11/26/18  Yes Itzel Smith MD   warfarin (COUMADIN) 1 MG tablet TAKE 2 TABLETS EVERY OTHER DAY 10/23/18  Yes Itzel Smith MD   warfarin (COUMADIN) 3 MG tablet TAKE 1 TABLET EVERY OTHER DAY AS DIRECTED 1/29/18  Yes Itzel Smith MD   sotalol (BETAPACE) 80 MG tablet TAKE ONE-HALF (1/2) TABLET TWICE A DAY  Patient taking differently: TAKE ONE TABLET TWICE A DAY 12/29/17  Yes Ernie Branch MD   spironolactone (ALDACTONE) 25 MG tablet TAKE 1 TABLET EVERY MORNING 9/11/17  Yes Itzel Smith MD   aspirin 81 MG tablet Take 81 mg by mouth every morning    Yes Historical Provider, MD   COLCRYS 0.6 MG tablet Take 1 tablet by mouth daily for 15 days 11/12/19 11/27/19  Itzel Smith MD   nitroGLYCERIN (NITROSTAT) 0.4 MG SL tablet Place 0.4 mg under the tongue 6/1/18   Historical Provider, MD   docusate sodium (COLACE) 100 MG capsule Take 100 mg by mouth every morning     Historical Provider, MD       Allergies:  Atorvastatin; Ciprofloxacin; Other; Penicillins; Simvastatin; Statins; and Sulfa antibiotics    Social History:   reports that she quit smoking about 21 years ago. She has a 5.00 pack-year smoking history. She has never used smokeless tobacco. She reports that she does not drink alcohol or use drugs. Family History: family history includes Heart Disease in her father; Heart Failure in her mother; Hypertension in her father; Stroke in her father. REVIEW OF SYSTEMS:    · Constitutional: there has been no unanticipated weight loss. There's been Yes change in energy level, Yes change in activity level. · Eyes: No visual changes or diplopia. No scleral icterus. · ENT: No Headaches, hearing loss or vertigo. No mouth sores or sore throat.   · Cardiovascular: No chest pain, Yes

## 2020-01-08 NOTE — PROGRESS NOTES
Per Dr. Abdi Husbands, will increase pace rate to 70, keep pt on PO amiodarone, and keep the Entresto dced. Okay with d/c from his standpoint. Spoke with [employer], will send rep over today to set the new rate.

## 2020-01-09 NOTE — PROGRESS NOTES
[facility]  Progress Note                   Date:   1/9/2020  Patient name: Sukhjinder Hassan  Date of admission:  1/3/2020  8:29 PM  MRN:   [id number]  YOB: 1940  PCP: Richar Sequeira MD    Reason for Admission: VT (ventricular tachycardia) ([facility] [I47.2]    Subjective:       Clinical Changes /Abnormalities:  Patient seen and examined in room. Denies concerns currently. No chest pain, SOB. 5 beat run of VT overnight patient asymptomatic. AV paced. Review of Systems    Medications:   Scheduled Meds:   warfarin  2 mg Oral Once    midodrine  5 mg Oral TID WC    amiodarone  200 mg Oral BID    docusate sodium  100 mg Oral QAM    levothyroxine  50 mcg Oral Daily    metoprolol succinate  25 mg Oral Daily    spironolactone  25 mg Oral Daily    sodium chloride flush  10 mL Intravenous 2 times per day    aspirin  325 mg Oral Daily    insulin lispro  0-6 Units Subcutaneous TID WC    insulin lispro  0-3 Units Subcutaneous Nightly    warfarin (COUMADIN) daily dosing (placeholder)   Other RX Placeholder    furosemide  40 mg Oral Daily     Continuous Infusions:   sodium chloride 75 mL/hr at 01/09/20 0023    dextrose       CBC:   No results for input(s): WBC, HGB, PLT in the last 72 hours. BMP:    Recent Labs     01/07/20  1006 01/08/20  0508 01/09/20  0501    137 139   K 4.3 4.0 3.9    100 104   CO2 19* 24 22   BUN 21 22 22   CREATININE 1.12* 1.37* 1.18*   GLUCOSE 54* 91 85     Hepatic:  No results for input(s): AST, ALT, ALB, BILITOT, ALKPHOS in the last 72 hours. Troponin:   No results for input(s): TROPHS in the last 72 hours. BNP: No results for input(s): BNP in the last 72 hours. Lipids:   No results for input(s): CHOL, HDL in the last 72 hours.     Invalid input(s): LDLCALCU  INR:   Recent Labs     01/07/20  0607 01/08/20  0508 01/09/20  0501   INR 3.6 3.2 2.9       Objective:   Vitals: BP (!) 110/49   Pulse 74   Temp 97.6 °F (36.4 °C) (Oral)   Resp 16 Ht 5' 3\" (1.6 m)   Wt 164 lb 7.4 oz (74.6 kg)   SpO2 97%   BMI 29.13 kg/m²   General appearance: alert and cooperative with exam  HEENT: Head: Normocephalic, no lesions, without obvious abnormality. Neck:no JVD, trachea midline, no adenopathy  Lungs: Clear to auscultation throughout  Heart: Regular rate and rhythm, s1/s2 auscultated, no murmurs. Paced  Abdomen: soft, non-tender, bowel sounds active  Extremities: no edema  Neurologic: not done    Interrogation was done which showed V. tach  220s, 2 nonsustained V. tach episode which lasted for 12 seconds. Assessment / Acute Cardiac Problems:   1. VT with AICD discharge  2. Hypokalemia  3. Chronic systolic CHF  4. DM2  5. Afib    Patient Active Problem List:     Hypothyroidism     Vision abnormalities     Other ovarian failure(256.39)     Female cystocele 2-3     Pessary maintenance     Breast cancer, left (HCC)     Personal history of breast cancer     GERD (gastroesophageal reflux disease)     Systolic CHF (HCC)     CKD (chronic kidney disease) stage 3, GFR 30-59 ml/min (HCC)     Idiopathic cardiomyopathy (HCC)     HLD (hyperlipidemia)     Coronary artery disease involving native coronary artery of native heart without angina pectoris     Essential hypertension     Type 2 diabetes mellitus with neurologic complication (HCC)     Combined systolic and diastolic congestive heart failure (HCC)     Supratherapeutic INR     Hypertensive heart disease     Pacemaker     ICD (implantable cardioverter-defibrillator) in place     Acquired hypothyroidism     Chronic atrial fibrillation     Low back pain     Syncope and collapse     Chronic gout of right ankle     VT (ventricular tachycardia) ([facility]     ICD (implantable cardioverter-defibrillator) discharge     Defibrillator discharge      Plan of Treatment:   1. V Tach 5 beat run overnight. Patient asymptomatic. EP consulted. per Dr. Sully Kong agrees with amiodarone at this time.   Recommends interrogation and reprogram to 70 base rate to improve cardiac output. Echo to reassess heart function awaiting results  2. Hypokalemia will order PO daily  3. CHF  Continue BB diuretic no signs of fluid overload   4. Afib. Continue amiodarone, BB, coumadin  5. Will await echo results.   If no further VT will sign off and can follow up with Dr. Malvin Angeles at [facility]    Electronically signed by SYBIL Randall CNP on 1/9/2020 at 10:17 [address] [facility].  [phone]

## 2020-01-09 NOTE — PROGRESS NOTES
Pharmacy Note  Warfarin Consult follow-up      Recent Labs     01/09/20  0501   INR 2.9     No results for input(s): HGB, HCT, PLT in the last 72 hours. Significant Drug-Drug Interactions:  New warfarin drug-drug interactions: none  Discontinued drug-drug interactions: none  Current warfarin drug-drug interactions: acetaminophen, amiodarone (new this admit), aspirin, levothyroxine    Date INR Dose   1/3/2020 3.0 None   1/4/2020 2.4 3 mg   1/5/2020 2.3 2 mg   1/6/2020 2.6 2mg   1/7/2020 3.6 HOLD   1/8/2020 3.2 0.5mg   1/9/2020 2.9 2 mg        Notes:          INR is down into therapeutic range. Patient's home regimen will likely need reduced due to initiation of amiodarone therapy. Will order warfarin 2 mg for today which a reduction from her home dose of 3 mg on Thursdays. Daily PT/INR while inpatient. [address] [place] [address]  Ph., CACP, Clinical Pharmacist  Anticoagulation Services, [address] [place] [address] [facility]  1/9/2020  8:06 AM

## 2020-01-09 NOTE — DISCHARGE SUMMARY
and she decided to call EMS and then developed  worsening of the shortness of breath and her defibrillator fired.  In route EMS did note there were multiple runs of ventricular tachycardia were not sustained and not aborted by defibrillator requiring repeat defibrillation. During the admission patient was managed as follow  Sotalol and Enteresto discontinued  Amiodarone started, EP consulted  Agree with with amio , f/u as OP  Echo done with EF of 30% ( improving)   Midodrine  Avoid all nephrotoxic agents  Blood pressure and glycemic control  DVT and GI prophylaxis. Daily labs. Discussed with the patient and the nurse    Stable for discharge    Med rec done  Scripts added   30+ minutes spent      Significant Diagnostic Studies:     Radiology:  Xr Chest Standard (2 Vw)    Result Date: 1/3/2020  Stable to slight interval increase in the interstitial prominence bilaterally, right greater than left. This may be technical or due to mild venous congestion/edema. No consolidation.        Consultations:    Consults:     Final Specialist Recommendations/Findings:   IP CONSULT TO CARDIOLOGY  IP CONSULT TO HOSPITALIST  PHARMACY TO DOSE WARFARIN      The patient was seen and examined on day of discharge   PE  A&O X 3  CTAB  NSR, NO MRG  Soft abdomen , +BS  No swelling  and pulse palpable  '  Discharge plan:     Disposition: Home    Physician Follow Up:     Lisha Barraza, [address] [place] [address]  [phone]             Requiring Further Evaluation/Follow Up POST HOSPITALIZATION/Incidental Findings:     Diet: cardiac diet    Activity: As tolerated    Instructions to Patient:     Discharge Medications:      Medication List      START taking these medications    amiodarone 200 MG tablet  Commonly known as:  CORDARONE  Take 1 tablet by mouth 2 times daily     midodrine 5 MG tablet  Commonly known as:  PROAMATINE  Take 1 tablet by mouth 3 times daily as needed (for SBP < 110)        CHANGE how you take these medications    furosemide 40 MG tablet  Commonly known as:  LASIX  Take 1 tablet by mouth daily  Start taking on:  January 10, 2020  What changed:  See the new instructions. CONTINUE taking these medications    aspirin 81 MG tablet     COLCRYS 0.6 MG tablet  Generic drug:  colchicine  Take 1 tablet by mouth daily for 15 days     docusate sodium 100 MG capsule  Commonly known as:  COLACE     glimepiride 2 MG tablet  Commonly known as:  AMARYL  TAKE 1 TABLET EVERY MORNING BEFORE BREAKFAST     levothyroxine 50 MCG tablet  Commonly known as:  SYNTHROID  Take 1 tablet by mouth Daily     metoprolol succinate 25 MG extended release tablet  Commonly known as:  TOPROL XL  TAKE 1 TABLET DAILY     nitroGLYCERIN 0.4 MG SL tablet  Commonly known as:  NITROSTAT     * warfarin 3 MG tablet  Commonly known as:  COUMADIN  TAKE 1 TABLET EVERY OTHER DAY AS DIRECTED     * warfarin 1 MG tablet  Commonly known as:  COUMADIN  TAKE 2 TABLETS EVERY OTHER DAY         * This list has 2 medication(s) that are the same as other medications prescribed for you. Read the directions carefully, and ask your doctor or other care provider to review them with you. STOP taking these medications    ENTRESTO 24-26 MG per tablet  Generic drug:  sacubitril-valsartan     sotalol 80 MG tablet  Commonly known as:  BETAPACE     spironolactone 25 MG tablet  Commonly known as:  ALDACTONE           Where to Get Your Medications      These medications were sent to [facility] [address] [place] [address], [address] [place] [address], [place] [address]    Phone:  [phone]   · amiodarone 200 MG tablet  · furosemide 40 MG tablet  · midodrine 5 MG tablet         Time Spent on discharge is  31 mins in patient examination, evaluation, counseling as well as medication reconciliation, prescriptions for required medications, discharge plan and follow up.     Electronically signed by   Joe Lomax

## 2020-01-09 NOTE — PLAN OF CARE
Problem: FALL RISK  Goal: Will remain free from falls  Description  Will remain free from falls     Will remain free from falls  1/9/2020 0629 by Rigoberto Segura RN  Outcome: Ongoing  1/8/2020 1753 by Aranza Maher RN  Outcome: Ongoing     Problem: Falls - Risk of:  Goal: Will remain free from falls  Description  Will remain free from falls     Will remain free from falls  1/9/2020 0629 by Rigoberto Segura RN  Outcome: Ongoing  1/8/2020 1753 by Aranza Maher RN  Outcome: Ongoing  Goal: Absence of physical injury  Description  Absence of physical injury  1/9/2020 0629 by Rigoberto Segura RN  Outcome: Ongoing  1/8/2020 1753 by Aranza Maher RN  Outcome: Ongoing     Problem: Cardiac:  Goal: Ability to maintain an adequate cardiac output will improve  Description  Ability to maintain an adequate cardiac output will improve  1/9/2020 0629 by Rigoberto Segura RN  Outcome: Ongoing  1/8/2020 1753 by Arnaza Maher RN  Outcome: Ongoing  Goal: Complications related to the disease process, condition or treatment will be avoided or minimized  Description  Complications related to the disease process, condition or treatment will be avoided or minimized  1/9/2020 0629 by Rigoberto Segura RN  Outcome: Ongoing  1/8/2020 1753 by Aranza Maher RN  Outcome: Ongoing  Goal: Hemodynamic stability will improve  Description  Hemodynamic stability will improve  1/9/2020 0629 by Rigoberto Segura RN  Outcome: Ongoing  1/8/2020 1753 by Aranza Maher RN  Outcome: Ongoing  Goal: Risk factors for ineffective tissue perfusion will decrease  Description  Risk factors for ineffective tissue perfusion will decrease  1/9/2020 0629 by Rigoberto Segura RN  Outcome: Ongoing  1/8/2020 1753 by Aranza Maher RN  Outcome: Ongoing

## 2020-01-09 NOTE — PROGRESS NOTES
CLINICAL PHARMACY NOTE: MEDS TO [address] Select Patient?: Yes  Total # of Prescriptions Filled: 3   The following medications were delivered to the patient:  · Amiodarone  · Midodrine  · Furosemide  Total # of Interventions Completed: 0  Time Spent (min): 0    Additional Documentation:  Medications delivered to patient. She did not have any questions at that time.     Annmarie Stephen  PharmD Candidate 2020 1/9/2020 4:45 PM

## 2020-01-09 NOTE — DISCHARGE INSTR - COC
Continuity of Care Form    Patient Name: Suzanne Hopper   :  1940  MRN:  [id number]    [facility] date:  1/3/2020  Discharge date:  ***    Code Status Order: Full Code   Advance Directives:   Advance Care Flowsheet Documentation     Date/Time Healthcare Directive Type of Healthcare Directive Copy in [address] Agent's Name Healthcare Agent's Phone Number    20 0015  No, patient does not have an advance directive for healthcare treatment -- -- -- -- --          Admitting Physician:  Citlali Dyer MD  PCP: Danette Lema MD    Discharging Nurse: [facility] Unit/Room#:   Discharging Unit Phone Number: ***    Emergency Contact:   Extended Emergency Contact Information  Primary Emergency Contact: JimJoselito  Address: [address] [place] [address] Phone: [phone]  Relation: Spouse  Secondary Emergency Contact: Sergio Corinajohn  Address: [address] Phone: [phone]  Relation: Child    Past Surgical History:  Past Surgical History:   Procedure Laterality Date    APPENDECTOMY      ATRIAL ABLATION SURGERY      BREAST BIOPSY Left 13    Invasive Mucinous Cancer of Breast    BREAST LUMPECTOMY      Left breast    CARDIAC DEFIBRILLATOR PLACEMENT      pacemaker / AICD    CATARACT REMOVAL WITH IMPLANT Bilateral     CORONARY ANGIOPLASTY         Immunization History:   Immunization History   Administered Date(s) Administered    Influenza Vaccine, unspecified formulation 10/25/2016    Influenza Virus Vaccine 2014, 2015, 10/24/2018    Influenza, High Dose (Fluzone 65 yrs and older) 10/25/2016, 10/24/2017, 10/24/2018    Influenza, Quadv, IM, PF (6 mo and older Fluzone, Flulaval, Fluarix, and 3 yrs and older Afluria) 10/24/2018    Influenza, Triv, inactivated, subunit, adjuvanted, IM (Fluad 65 yrs and older) 10/24/2017, 10/01/2019    Pneumococcal Conjugate

## 2020-01-10 NOTE — CARE COORDINATION
[facility] Transitions Initial Follow Up Call    Call within 2 business days of discharge: Yes    Patient: Bear Avila Patient : 1940   MRN: [id number]  Reason for Admission: ICD discharge - medication adjustment  Discharge Date: 20 RARS: Readmission Risk Score: 18      Last Discharge [facility]       Complaint Diagnosis Description Type Department Provider    1/3/20 Shortness of Breath Defibrillator discharge . .. ED to Hosp-Admission (Discharged) (ADMITTED) STZ CAR 2 Elizabeth Bernstein MD; [place]. .. Spoke with: patient  Facility: [facility]  Non-face-to-face services provided:  Scheduled appointment with PCP-20  Scheduled appointment with Windy Hamman will schedule with Dr Wendy Burgess, cardio  Obtained and reviewed discharge summary and/or continuity of care documents  Assessment and support for treatment adherence and medication management-reviewed medications with patient - stopped entresto, sotolol, aldactone - added amiodorone + proamitine + lasix adjustment   Taking all as prescribed. 1111F completed. Patient reports feeling OK with exception of being fatigued - did not rest well in hospital.  Weighs self daily - 163 lbs today - denies swelling or SOB. Reinforced the importance of daily weights especially since diuretics were readjusted. She participates in the [facility] CHF clinic & anticoagulant clinic. Informed that since amiodarone was added - she will need more frequent INR checks initially. Patient checks her BP @ home - she reports that it usually runs low 100's - usually under 110 & sometimes in the 90's. Reviewed Proamitine guidelines if SBP <110 - she is instructed to take up to 3 times daily. Instructed to monitor BP trends & how many times she takes the proamitine & to review this @ next cardiology visit. Patient has PCP appt on  & then will f/u with cardiology.   Care Transitions will continue to follow & patient has CTN contact

## 2020-01-13 PROBLEM — E86.0 DEHYDRATION: Status: ACTIVE | Noted: 2020-01-01

## 2020-01-17 NOTE — CARE COORDINATION
Follow up call to patient. Patient stated she is doing well since discharge. He blood sugar was 88 this morning, she asked if that is too low. CC reviewed appropriate blood sugar levels and treatment for hypoglycemia if needed. Patient has a follow up with cardiology on 1.28.2020. She has already followed up with PCP since discharge. She denied any needs from CC but stated she will call if anything comes up.

## 2020-01-29 NOTE — CARE COORDINATION
Breast cancer, left ([facility] [address]    Personal history of breast cancer    GERD (gastroesophageal reflux disease)    Systolic CHF (HCC)    CKD (chronic kidney disease) stage 3, GFR 30-59 ml/min (HCC)    Idiopathic cardiomyopathy (HCC)    HLD (hyperlipidemia)    Coronary artery disease involving native coronary artery of native heart without angina pectoris    Essential hypertension    Type 2 diabetes mellitus with neurologic complication (HCC)    Combined systolic and diastolic congestive heart failure (HCC)    Supratherapeutic INR    Hypertensive heart disease    Pacemaker    ICD (implantable cardioverter-defibrillator) in place    Acquired hypothyroidism    Chronic atrial fibrillation    Low back pain    Syncope and collapse    Chronic gout of right ankle    VT (ventricular tachycardia) ([facility] [address]    ICD (implantable cardioverter-defibrillator) discharge    Defibrillator discharge    Dehydration         Follow Up  Future Appointments   Date Time Provider Russ Cardona   4/6/2020  2:45 PM Hoa Ibrahim -[facility], RN

## 2020-01-30 NOTE — CARE COORDINATION
uncertainly or problems already being investigated   Are the patients physical health problems impacting on their mental well-being?:  No identified areas of concern   Are there any problems with your patients lifestyle behaviors (alcohol, drugs, diet, exercise) that are impacting on physical or mental well-being?:  No identified areas of concern   Do you have any other concerns about your patients mental well-being? How would you rate their severity and impact on the patient?:  No identified areas of concern   How would you rate their home environment in terms of safety and stability (including domestic violence, insecure housing, neighbor harassment)?:  Consistently safe, supportive, stable, no identified problems   How do daily activities impact on the patient's well-being? (include current or anticipated unemployment, work, caregiving, access to transportation or other):  No identified problems or perceived positive benefits   How would you rate their social network (family, work, friends)?:  Adequate participation with social networks   How would you rate their financial resources (including ability to afford all required medical care)?:  Financially secure, resources adequate, no identified problems   How wells does the patient now understand their health and well-being (symptoms, signs or risk factors) and what they need to do to manage their health?:  Reasonable to good understanding and already engages in managing health or is willing to undertake better management   How well do you think your patient can engage in healthcare discussions? (Barriers include language, deafness, aphasia, alcohol or drug problems, learning difficulties, concentration):  Clear and open communication, no identified barriers   Do other services need to be involved to help this patient?:  Other care/services not required at this time   Are current services involved with this patient well-coordinated?  (Include coordination with other services you are now recommendation): All required care/services in place and well-coordinated   Suggested Interventions and [employer] Services:  Completed   Zone Management Tools:  Completed                  Prior to Admission medications    Medication Sig Start Date End Date Taking?  Authorizing Provider   sacubitril-valsartan (ENTRESTO) 24-26 MG per tablet Take 1 tablet by mouth 2 times daily Indications: resumed 1/28/20    Historical MD Chelsey   sotalol (BETAPACE) 80 MG tablet Take 80 mg by mouth 2 times daily Indications: restarted at higher dose on 1/23 by cardio when pt could not tolerate amiodarone    Historical Provider, MD   furosemide (LASIX) 40 MG tablet Take 1 tablet by mouth daily 1/10/20   Yunior King MD   midodrine (PROAMATINE) 5 MG tablet Take 1 tablet by mouth 3 times daily as needed (for SBP < 110) 1/9/20   Yunior King MD   metoprolol succinate (TOPROL XL) 25 MG extended release tablet TAKE 1 TABLET DAILY 10/9/19   Martha Cortez MD   glimepiride (AMARYL) 2 MG tablet TAKE 1 TABLET EVERY MORNING BEFORE BREAKFAST 10/8/19   Martha Cortez MD   levothyroxine (SYNTHROID) 50 MCG tablet Take 1 tablet by mouth Daily 5/17/19   Martha Cortez MD   warfarin (COUMADIN) 1 MG tablet TAKE 2 TABLETS EVERY OTHER DAY 10/23/18   Martha Cortez MD   nitroGLYCERIN (NITROSTAT) 0.4 MG SL tablet Place 0.4 mg under the tongue 6/1/18   Historical Provider, MD   warfarin (COUMADIN) 3 MG tablet TAKE 1 TABLET EVERY OTHER DAY AS DIRECTED 1/29/18   Martha Cortez MD   docusate sodium (COLACE) 100 MG capsule Take 100 mg by mouth every morning     Historical Provider, MD   aspirin 81 MG tablet Take 81 mg by mouth every morning     Historical Provider, MD       Future Appointments   Date Time Provider Russ Cardona   4/6/2020  2:45 PM Martha Cortez MD [address]

## 2020-02-06 NOTE — CARE COORDINATION
Ambulatory Care Coordination Note  2/6/2020  CM Risk Score: 11  Charlson 10 Year Mortality Risk Score: 100%     ACC: Stacey Esparza RN    Summary Note: Spoke with patient who stated she is still having some dizziness and not feeling well at times. Per patient this is since her cardiologist made medication changes. She stated she was told it could take a month to get the other medication completely out of her system. Patient offered a appointment with PCP but declined stating she is working closely with her cardiologist.   He did re start her on Entresto and gave her samples, when these run out the medication will cost her 400 a month. CC will mail her patient assistance forms. CC Plan:   Mail patient assistance  Follow up call in 7-10 days     Care Coordination Interventions    Program Enrollment:  Complex Care  Referral from Primary Care Provider:  No  Suggested Interventions and Community Resources  Medication Assistance Program:  Completed (Comment: Entresto patient assistance)  Transportation Support:  Completed  Zone Management Tools:  Completed         Goals Addressed                 This Visit's Progress     Conditions and Symptoms   Improving     I will schedule office visits, as directed by my provider. I will keep my appointment or reschedule if I have to cancel. I will notify my provider of any barriers to my plan of care. I will follow my Zone Management tool to seek urgent or emergent care. I will notify my provider of any symptoms that indicate a worsening of my condition. Barriers: overwhelmed by complexity of regimen  Plan for overcoming my barriers: work with cc   Confidence: 8/10  Anticipated Goal Completion Date: 4.1.2020              Prior to Admission medications    Medication Sig Start Date End Date Taking?  Authorizing Provider   COLCRYS 0.6 MG tablet Take 1 tablet by mouth daily for 15 days 1/30/20 2/14/20  Staci Estevez MD   sacubitril-valsartan (ENTRESTO) 24-26 MG per 1800cc  Do you understand a low sodium diet?:  Yes  Do you understand how to read food labels?:  Yes  How many restaurant meals do you eat per week?:  1-2  Do you salt your food before tasting it?:  No     No patient-reported symptoms      Symptoms:      Symptom course:  no change  Weight trend:  stable  Salt intake watch compared to last visit:  stable      and   General Assessment    Do you have any symptoms that are causing concern?:  Yes  Progression since Onset:  Unchanged  Reported Symptoms:  Other (Comment: dizziness)

## 2020-02-10 NOTE — CARE COORDINATION
Ambulatory Care Coordination Note  2/10/2020  CM Risk Score: 11  Charlson 10 Year Mortality Risk Score: 100%     ACC: Citlali Yu RN    Summary Note: Patient called requesting a follow up with PCP from hospital OR. Per patient she was admitted from the heart clinic. Patient stated she is feeling better now. Verified she has all of her medications and no questions now about them. CC scheduled appointment for 2 days from now. CC Plan:   Follow up with patient at upcoming PCP appointment    Care Coordination Interventions    Program Enrollment:  Complex Care  Referral from Primary Care Provider:  No  Suggested Interventions and Community Resources  Medication Assistance Program:  Completed (Comment: Entresto patient assistance)  Transportation Support:  Completed  Zone Management Tools:  Completed         Goals Addressed                 This Visit's Progress     Conditions and Symptoms   Improving     I will schedule office visits, as directed by my provider. I will keep my appointment or reschedule if I have to cancel. I will notify my provider of any barriers to my plan of care. I will follow my Zone Management tool to seek urgent or emergent care. I will notify my provider of any symptoms that indicate a worsening of my condition. Barriers: overwhelmed by complexity of regimen  Plan for overcoming my barriers: work with cc   Confidence: 8/10  Anticipated Goal Completion Date: 4.1.2020              Prior to Admission medications    Medication Sig Start Date End Date Taking?  Authorizing Provider   COLCRYS 0.6 MG tablet Take 1 tablet by mouth daily for 15 days 1/30/20 2/14/20  Kathy Álvarez MD   sacubitril-valsartan (ENTRESTO) 24-26 MG per tablet Take 1 tablet by mouth 2 times daily Indications: resumed 1/28/20    Historical Provider, MD   sotalol (BETAPACE) 80 MG tablet Take 80 mg by mouth 2 times daily Indications: restarted at higher dose on 1/23 by cardio when pt could not tolerate amiodarone    Historical Provider, MD   furosemide (LASIX) 40 MG tablet Take 1 tablet by mouth daily 1/10/20   Nancy Hazel MD   midodrine (PROAMATINE) 5 MG tablet Take 1 tablet by mouth 3 times daily as needed (for SBP < 110) 1/9/20   Nancy Hazel MD   metoprolol succinate (TOPROL XL) 25 MG extended release tablet TAKE 1 TABLET DAILY 10/9/19   Jose Abbasi MD   glimepiride (AMARYL) 2 MG tablet TAKE 1 TABLET EVERY MORNING BEFORE BREAKFAST 10/8/19   Jose Abbasi MD   levothyroxine (SYNTHROID) 50 MCG tablet Take 1 tablet by mouth Daily 5/17/19   Jose Abbasi MD   warfarin (COUMADIN) 1 MG tablet TAKE 2 TABLETS EVERY OTHER DAY 10/23/18   Jose Abbasi MD   nitroGLYCERIN (NITROSTAT) 0.4 MG SL tablet Place 0.4 mg under the tongue 6/1/18   Historical Provider, MD   warfarin (COUMADIN) 3 MG tablet TAKE 1 TABLET EVERY OTHER DAY AS DIRECTED 1/29/18   Jose Abbasi MD   docusate sodium (COLACE) 100 MG capsule Take 100 mg by mouth every morning     Historical Provider, MD   aspirin 81 MG tablet Take 81 mg by mouth every morning     Historical Provider, MD       Future Appointments   Date Time Provider Russ Cardona   2/12/2020 12:00 PM Jose Abbasi [address] [place] [address]   4/6/2020  2:45 PM [facility]asi [facility]

## 2020-02-12 PROBLEM — E86.0 DEHYDRATION: Status: RESOLVED | Noted: 2020-01-01 | Resolved: 2020-01-01

## 2020-02-12 NOTE — PROGRESS NOTES
No supraclavicular or epitrochlear adenopathy. Skin:     General: Skin is warm and dry. Coloration: Skin is not pale. Findings: No bruising, erythema, laceration, petechiae or rash. Nails: There is no clubbing. Comments: Bruise right calf   Neurological:      Mental Status: She is alert and oriented to person, place, and time. Cranial Nerves: No cranial nerve deficit. Motor: No tremor, atrophy, abnormal muscle tone or seizure activity. Coordination: Coordination normal.      Gait: Gait normal.   Psychiatric:         Speech: Speech normal.         Behavior: Behavior normal. Behavior is cooperative. Thought Content: Thought content normal.         Judgment: Judgment normal.         Assessment / Plan:   Godfrey Mckay was seen today for follow-up from hospital, breast cancer and health maintenance. Diagnoses and all orders for this visit:    Idiopathic cardiomyopathy (HCC)= no symptoms     Chronic systolic congestive heart failure (HCC)= well compensated     Coronary artery disease involving native coronary artery of native heart without angina pectoris= no angina     Combined systolic and diastolic congestive heart failure, unspecified HF chronicity (HCC)    Chronic atrial fibrillation= well controlled    VT (ventricular tachycardia) (HCC)= Has ICD    Type 2 diabetes mellitus with diabetic neuropathy, without long-term current use of insulin ([facility] [address]    ICD (implantable cardioverter-defibrillator) in place     Return in about 4 weeks (around 3/11/2020) for heart failure, diabetes mellitus. No orders of the defined types were placed in this encounter. No orders of the defined types were placed in this encounter. Return in about 4 weeks (around 3/11/2020) for heart failure, diabetes mellitus. No orders of the defined types were placed in this encounter. No orders of the defined types were placed in this encounter.          Visit Information    Have you changed or started any medications since your last visit including any over-the-counter medicines, vitamins, or herbal medicines? no   Are you having any side effects from any of your medications? -  no  Have you stopped taking any of your medications? Is so, why? -  no    Have you seen any other physician or provider since your last visit? Yes - Records Requested  Have you had any other diagnostic tests since your last visit? Yes - Records Requested  Have you been seen in the emergency room and/or had an admission to a hospital since we last saw you? Yes - Records Requested  Have you had your routine dental cleaning in the past 6 months? no    Have you activated your [email or web address] account? If not, what are your barriers? No:      Patient Care Team:  Lilian Diaz MD as PCP - Liseth Gomez MD as PCP - [facility]  Keaton Tyson MD as Consulting Physician (Internal Medicine Cardiovascular Disease)  Anupama Mesa MD as Consulting Physician (Internal Medicine)  Darin Cullen RN as [address] [place] [address] History Review  Past Medical, Family, and Social History reviewed and does contribute to the patient presenting condition    Health Maintenance   Topic Date Due    DTaP/Tdap/Td vaccine (1 - Tdap) [birth year]/1950    Hepatitis B vaccine (1 of 3 - Risk 3-dose series) 09/14/1958    Shingles Vaccine (1 of 2) 09/14/1989    TSH testing  01/23/2018    Annual Wellness Visit (AWV)  05/29/2019    Potassium monitoring  01/09/2021    Creatinine monitoring  01/09/2021    DEXA (modify frequency per FRAX score)  Completed    Flu vaccine  Completed    Pneumococcal 65+ years Vaccine  Completed    Hepatitis A vaccine  Aged Out    Hib vaccine  Aged Out    Meningococcal (ACWY) vaccine  Aged Out       Chief Complaint   Patient presents with    Follow-Up from Hospital     Pt was admitted [facility] and unsure of diagnosis. Pt was admitted friday and released monday.  [facility] [place] [facility] [place] [facility] [place] [facility]

## 2020-02-20 NOTE — CARE COORDINATION
associated leg swelling: Neg      Symptom course:  stable  Weight trend:  stable  Salt intake watch compared to last visit:  stable      and   General Assessment    Do you have any symptoms that are causing concern?:  Yes  Progression since Onset:  Unchanged  Reported Symptoms:  Fatigue

## 2020-02-26 NOTE — CARE COORDINATION
Spoke with spouse who stated Jason Askew is still admitted to [facility] to [place] her lungs cleared out\". CC explained she will follow up when Jason Askew comes home. Spouse was grateful for the call.

## 2020-03-02 NOTE — CARE COORDINATION
daily Indications: restarted at higher dose on 1/23 by cardio when pt could not tolerate amiodarone    Historical Provider, MD   furosemide (LASIX) 40 MG tablet Take 1 tablet by mouth daily  Patient taking differently: Take 40 mg by mouth 2 times daily  1/10/20   Kyler Rae MD   midodrine (PROAMATINE) 5 MG tablet Take 1 tablet by mouth 3 times daily as needed (for SBP < 110) 1/9/20   Kyler Rae MD   metoprolol succinate (TOPROL XL) 25 MG extended release tablet TAKE 1 TABLET DAILY 10/9/19   Staci Estevez MD   glimepiride (AMARYL) 2 MG tablet TAKE 1 TABLET EVERY MORNING BEFORE BREAKFAST 10/8/19   Staci Estevez MD   levothyroxine (SYNTHROID) 50 MCG tablet Take 1 tablet by mouth Daily 5/17/19   Staci Estevez MD   warfarin (COUMADIN) 1 MG tablet TAKE 2 TABLETS EVERY OTHER DAY 10/23/18   Staci Estevez MD   nitroGLYCERIN (NITROSTAT) 0.4 MG SL tablet Place 0.4 mg under the tongue 6/1/18   Historical Provider, MD   warfarin (COUMADIN) 3 MG tablet TAKE 1 TABLET EVERY OTHER DAY AS DIRECTED 1/29/18   Staci Estevez MD   docusate sodium (COLACE) 100 MG capsule Take 100 mg by mouth every morning     Kenna Provider, MD   aspirin 81 MG tablet Take 81 mg by mouth every morning     Historical Provider, MD       Future Appointments   Date Time Provider Russ Cardona   3/11/2020 12:45 PM Staci Estevez MD [address]     ,   Diabetes Assessment    Medic Alert ID:  No  Meal Planning:  Avoidance of concentrated sweets   How often do you test your blood sugar?:  Daily   Do you have barriers with adherence to non-pharmacologic self-management interventions?  (Nutrition/Exercise/Self-Monitoring):  No   Have you ever had to go to the ED for symptoms of low blood sugar?:  No       Increase or Decrease trend in Blood Sugars       and   Congestive Heart Failure Assessment    Are you currently restricting fluids?:  1800cc  Do you understand a low sodium diet?:  Yes  Do you understand how to read

## 2020-03-11 PROBLEM — N30.00 ACUTE CYSTITIS WITHOUT HEMATURIA: Status: ACTIVE | Noted: 2020-01-01

## 2020-03-11 NOTE — PROGRESS NOTES
Subjective:      Patient ID: Matilda Rose is a [de-identified] y.o. female. Was admitted to [facility] with hypotension and weakness. Was their impression that this was due to [place]      Review of Systems   Constitutional: Negative for fatigue (she has no symptoms ). HENT: Negative for sneezing. Eyes: Negative. Respiratory: Positive for shortness of breath. Cardiovascular: Negative for chest pain and palpitations. Syncope    Gastrointestinal: Negative for constipation. Endocrine: Negative. Genitourinary: Negative. Allergic/Immunologic: Negative. Neurological: Negative. Objective:   Physical Exam  Vitals signs reviewed. Constitutional:       General: She is not in acute distress. Appearance: She is well-developed. She is not diaphoretic. HENT:      Head: Normocephalic and atraumatic. No abrasion, contusion or laceration. Right Ear: External ear normal.      Left Ear: External ear normal.      Nose: No septal deviation, laceration or mucosal edema. Mouth/Throat:      Mouth: Mucous membranes are not pale. Pharynx: No oropharyngeal exudate. Eyes:      General: No scleral icterus. Right eye: No discharge. Left eye: No discharge. Pupils: Pupils are equal, round, and reactive to light. Neck:      Musculoskeletal: Neck supple. Thyroid: No thyroid mass or thyromegaly. Vascular: No carotid bruit or hepatojugular reflux. Cardiovascular:      Rate and Rhythm: Normal rate and regular rhythm. Heart sounds: Normal heart sounds. No murmur. No S3 sounds. Comments: Apical HR 84/ yancy  Pulmonary:      Effort: Pulmonary effort is normal.      Breath sounds: Normal breath sounds. No wheezing or rales. Abdominal:      Palpations: Abdomen is soft. Musculoskeletal: Normal range of motion. General: No tenderness. Lymphadenopathy:      Cervical: No cervical adenopathy.       Upper Body:      Right upper body: No dizziness and no blackouts. No lumps anywhere.  Diabetes     following up.  Breast Cancer     [facility]     Due Medicare wellness and dtap vaccine.

## 2020-03-12 NOTE — CARE COORDINATION
Spoke with patient who would like to try the 2 smaller potassium pills, will route to PCP. Patient understood to call and schedule a renal US. She has centralized scheduling number.    CC Plan:   Follow up in 3-5 days check on US scheduling and RX request.

## 2020-03-16 NOTE — TELEPHONE ENCOUNTER
CC spoke with PCP who stated that he will call her with recommendations. PCP requests CC to call patient in 2 days to check symptoms.

## 2020-03-16 NOTE — CARE COORDINATION
Patient called CC stating \"I can not take this, I am not breathing well\". Per patient she has been having trouble breathing all weekend. She denied any swelling in her legs. She has a cough but cannot get anything to come up, stuck in her throat. She is not audibly SOB and denied a fever. She has all if her medications. She does not have a nebulizer. Will route to PCP for advice.

## 2020-03-17 PROBLEM — I50.23 CONGESTIVE HEART FAILURE, NYHA CLASS IV, ACUTE ON CHRONIC, SYSTOLIC (HCC): Status: ACTIVE | Noted: 2020-01-01

## 2020-03-17 PROBLEM — Z82.49 FH: CHF (CONGESTIVE HEART FAILURE): Status: ACTIVE | Noted: 2020-01-01

## 2020-03-17 NOTE — ED NOTES
Pt ambulated to and back from bathroom with one assist without incident.      Karie Nageotte, RN  03/17/20 3031

## 2020-03-17 NOTE — PROGRESS NOTES
Medication History completed:    New medications: none    Medications discontinued: colchicine    Changes to dosing:   Warfarin changed to 1 mg Mon/Fri and 2 mg (2 tablets) all other days  Furosemide changed to 40 mg twice daily    Stated allergies: As listed    Other pertinent information: Medications confirmed with [facility]. [facility] Anticoagulation Service notes reviewed.     Thank you,  Sheila Carolina, PharmD, BCPS  [phone]

## 2020-03-17 NOTE — ED PROVIDER NOTES
[address] [facility]  eMERGENCY dEPARTMENT eNCOUnter      Pt Name: Michelle Moncada  MRN: [id number]  [place] 1940  Date of evaluation: 3/17/20      CHIEF COMPLAINT       Chief Complaint   Patient presents with    Shortness of Breath     HISTORY OF PRESENT ILLNESS   HPI [de-identified] y.o. female comes to the emergency department with complaints of shortness of breath. Symptoms have been progressively worsening over the last week. The patient reports that she cannot walk from her chair across the room without becoming severely winded. She states that symptoms are associated with lower extremity swelling and orthopnea. She has a history of cardiomyopathy. She called her primary care provider who asked that she come to the emergency department, he actually called me discussed with me says he would like to have her admitted and have cardiology evaluate her. She denies any blood in her stool. She denies any chest pain. She is on anticoagulation. No fevers or chills. No sore throat or cough. REVIEW OF SYSTEMS       Review of Systems   Constitutional: Positive for fatigue. Negative for fever. HENT: Negative for congestion and rhinorrhea. Eyes: Negative for visual disturbance. Respiratory: Positive for shortness of breath. Negative for cough. Cardiovascular: Positive for leg swelling. Negative for chest pain. Gastrointestinal: Negative for abdominal pain, nausea and vomiting. Genitourinary: Negative for dysuria. Skin: Negative for rash. Neurological: Negative for headaches. Hematological: Negative for adenopathy.        PAST MEDICAL HISTORY     Past Medical History:   Diagnosis Date    Atrial fibrillation [facility])     Breast cancer, left breast ([facility] 12/2/13    pt being seen at [facility]. s/p lumpectomy    CAD (coronary artery disease)     non-obstructive    CHF (congestive heart failure) ([facility] [address] 08/08/2014    inpt BPCH- EF 15-20% 7/25/16    Diabetes ([facility] [address]     Diabetes ([facility] [address] non-insulin    Female bladder prolapse     GERD (gastroesophageal reflux disease) 4/23/2014    HLD (hyperlipidemia)     unable to tolerate meds    Hypertension     Hypothyroid     Hypothyroidism     Idiopathic cardiomyopathy ([facility] [address]     severe    Kidney disease     stage II    Low back pain 9/26/2017    Personal history of breast cancer 12/2/13    left-following with Dr Christine Fleming Type 2 diabetes mellitus with neurologic complication ([facility] [address] [id number]/[id number]    Vision abnormalities        SURGICAL HISTORY       Past Surgical History:   Procedure Laterality Date    APPENDECTOMY      ATRIAL ABLATION SURGERY      BREAST BIOPSY Left 12/2/13    Invasive Mucinous Cancer of Breast    BREAST LUMPECTOMY      Left breast    CARDIAC DEFIBRILLATOR PLACEMENT      pacemaker / AICD    CATARACT REMOVAL WITH IMPLANT Bilateral     CORONARY ANGIOPLASTY         CURRENT MEDICATIONS       Current Discharge Medication List      CONTINUE these medications which have NOT CHANGED    Details   !! warfarin (COUMADIN) 1 MG tablet Take 1 mg by mouth Twice a Week Indications: Mon/Fri Per [facility]      !! warfarin (COUMADIN) 1 MG tablet Take 2 mg by mouth Five times weekly Indications: Sun, Tues, Wed, Thurs, Sat Per [facility]   2 tablets      furosemide (LASIX) 40 MG tablet Take 40 mg by mouth 2 times daily      sotalol (BETAPACE) 80 MG tablet Take 80 mg by mouth 2 times daily Indications: restarted at higher dose on 1/23 by cardio when pt could not tolerate amiodarone      midodrine (PROAMATINE) 5 MG tablet Take 1 tablet by mouth 3 times daily as needed (for SBP < 110)  Qty: 90 tablet, Refills: 0      metoprolol succinate (TOPROL XL) 25 MG extended release tablet TAKE 1 TABLET DAILY  Qty: 90 tablet, Refills: 4      glimepiride (AMARYL) 2 MG tablet TAKE 1 TABLET EVERY MORNING BEFORE BREAKFAST  Qty: 90 tablet, Refills: 4      levothyroxine (SYNTHROID) 50 MCG tablet Take 1 tablet pulmonary edema vs atypical infection. Clinically this is most likely heart failure, no cough, fevers, bodyaches. D/w Dr. Star Isabel orders placed. IV lasix administered. Pt updated and in agreement with plan. DIAGNOSTIC RESULTS     EKG: All EKG's are interpreted by the Emergency Department Physician who either signs or Co-signs this chart in the absence of a cardiologist.    EKG shows a ventricularly paced rhythm. HR is 76, , QTC automated read is 625 but this is in error and actual qtc is 450 , no JN, No STD, No TWI, the axis is leftwards      RADIOLOGY:All plain film, CT, MRI, and formal ultrasound images (except ED bedside ultrasound) are read by the radiologist and the images and interpretations are directly viewed by the emergency physician. XR CHEST STANDARD (2 VW)   Final Result   Increased right upper lobe perihilar opacities may be related to pulmonary   edema versus an atypical infection. XR CHEST STANDARD (2 VW)    (Results Pending)       LABS: All lab results were reviewed by myself, and all abnormals are listed below.   Labs Reviewed   CBC WITH AUTO DIFFERENTIAL - Abnormal; Notable for the following components:       Result Value    RBC 3.72 (*)     Seg Neutrophils 67 (*)     Lymphocytes 20 (*)     Monocytes 10 (*)     All other components within normal limits   COMPREHENSIVE METABOLIC PANEL - Abnormal; Notable for the following components:    Glucose 219 (*)     CREATININE 1.06 (*)     Sodium 130 (*)     Potassium 3.5 (*)     Chloride 91 (*)     GFR Non- 50 (*)     All other components within normal limits   BRAIN NATRIURETIC PEPTIDE - Abnormal; Notable for the following components:    Pro-BNP 11,075 (*)     All other components within normal limits   PROTIME-INR - Abnormal; Notable for the following components:    Protime 24.9 (*)     All other components within normal limits   TROPONIN   BASIC METABOLIC PANEL W/ REFLEX TO MG FOR LOW K   CBC EMERGENCY DEPARTMENT COURSE:   Vitals:    Vitals:    03/17/20 1532 03/17/20 1811   BP: 104/62 (!) 104/55   Pulse: 75 78   Resp: 20 18   Temp: 97.4 °F (36.3 °C) 97.5 °F (36.4 °C)   TempSrc: Oral Oral   SpO2: 94% 90%   Weight: 157 lb (71.2 kg) 162 lb 14.7 oz (73.9 kg)   Height: 5' 3\" (1.6 m) 5' 3\" (1.6 m)       The patient was given the following medications while in the emergency department:  Orders Placed This Encounter   Medications    sodium chloride flush 0.9 % injection 10 mL    sodium chloride flush 0.9 % injection 10 mL    DISCONTD: acetaminophen (TYLENOL) tablet 650 mg    potassium chloride (KLOR-CON M) extended release tablet 40 mEq    furosemide (LASIX) injection 20 mg    aspirin EC tablet 81 mg    colchicine (COLCRYS) tablet 0.6 mg    DISCONTD: warfarin (COUMADIN) tablet 1 mg    nitroGLYCERIN (NITROSTAT) SL tablet 0.4 mg    midodrine (PROAMATINE) tablet 5 mg    metoprolol succinate (TOPROL XL) extended release tablet 25 mg    levothyroxine (SYNTHROID) tablet 50 mcg    sodium chloride flush 0.9 % injection 10 mL    sodium chloride flush 0.9 % injection 10 mL    OR Linked Order Group     acetaminophen (TYLENOL) tablet 650 mg     acetaminophen (TYLENOL) suppository 650 mg    polyethylene glycol (GLYCOLAX) packet 17 g    OR Linked Order Group     potassium chloride (KLOR-CON M) extended release tablet 40 mEq     potassium bicarb-citric acid (EFFER-K) effervescent tablet 40 mEq     potassium chloride 10 mEq/100 mL IVPB (Peripheral Line)    furosemide (LASIX) injection 40 mg    insulin lispro (HUMALOG) injection vial 0-12 Units    insulin lispro (HUMALOG) injection vial 0-6 Units    glucose (GLUTOSE) 40 % oral gel 15 g    dextrose 50 % IV solution    glucagon (rDNA) injection 1 mg    dextrose 5 % solution    warfarin (COUMADIN) daily dosing (placeholder)    warfarin (COUMADIN) tablet 2 mg     -------------------------  CRITICAL CARE:   CONSULTS: IP CONSULT TO CARDIOLOGY  IP

## 2020-03-17 NOTE — H&P
Negative for agitation, confusion and suicidal ideas. The patient is not nervous/anxious. Physical Exam:   /62   Pulse 75   Temp 97.4 °F (36.3 °C) (Oral)   Resp 20   Ht 5' 3\" (1.6 m)   Wt 157 lb (71.2 kg)   SpO2 94%   BMI 27.81 kg/m²   Temp (24hrs), Av.4 °F (36.3 °C), Min:97.4 °F (36.3 °C), Max:97.4 °F (36.3 °C)    No results for input(s): POCGLU in the last 72 hours. No intake or output data in the 24 hours ending 20 1805    Physical Exam  Constitutional:       General: She is not in acute distress. Appearance: Normal appearance. She is not diaphoretic. HENT:      Mouth/Throat:      Pharynx: No oropharyngeal exudate or posterior oropharyngeal erythema. Eyes:      General:         Right eye: No discharge. Left eye: No discharge. Neck:      Musculoskeletal: No neck rigidity or muscular tenderness. Cardiovascular:      Rate and Rhythm: Normal rate and regular rhythm. Pulses: Normal pulses. Heart sounds: Normal heart sounds. No murmur. No friction rub. No gallop. Pulmonary:      Effort: Pulmonary effort is normal. No respiratory distress. Breath sounds: Normal breath sounds. No stridor. No wheezing, rhonchi or rales. Chest:      Chest wall: No tenderness. Abdominal:      General: Abdomen is flat. Bowel sounds are normal. There is no distension. Palpations: Abdomen is soft. There is no mass. Tenderness: There is no abdominal tenderness. There is no guarding or rebound. Hernia: No hernia is present. Skin:     General: Skin is dry. Coloration: Skin is not jaundiced or pale. Findings: No bruising or erythema. Neurological:      Mental Status: She is oriented to person, place, and time.          Investigations:     Laboratory Testing:  Recent Results (from the past 24 hour(s))   CBC with DIFF    Collection Time: 20  4:11 PM   Result Value Ref Range    WBC 6.8 3.5 - 11.0 k/uL    RBC 3.72 (L) 4.0 - 5.2 m/uL    Hemoglobin dose warfarin  -AICD  -Hold sotalol for QRS prolongation  -Metoprolol 25 mg oral  -Telemetry     #DVT prophylaxis:  -Pharmacy to dose warfarin  -PT: 24.9, INR: 2.2    #PT/OT/SW    Consultations:   IP CONSULT TO CARDIOLOGY  IP CONSULT TO CARDIOLOGY  IP CONSULT TO SOCIAL WORK  PHARMACY TO DOSE WARFARIN     Patient is admitted as inpatient status because of co-morbiditieslisted above, severity of signs and symptoms as outlined, requirement for current medical therapies and most importantly because of direct risk to patient if care not provided in a hospital setting.     Bijal Torres MD  3/17/2020  6:05 PM    Copy sent to Dr. Amber Schmitt MD

## 2020-03-17 NOTE — PROGRESS NOTES
[facility]  DVT Prophylaxis and Vaccine Status  Work List  Mandatory for all patients      Patient must be on both Chemical prophylaxis and Mechanical prophylaxis.  If chemical/mechanical prophylaxis is not ordered, the physician must document a reason for not using prophylaxis     Chemical Prophylaxis  Is patient on chemical prophylaxis: Yes  If no chemical prophylaxis Is a order in for No Chemical VTE prophylaxis N/A  If no was the physician notified not applicable      Mechanical Prophylaxis  Is patient on mechanical prophylaxis, intermittent pneumatic compression device: Yes  If no was the physician notified not applicable        Pneumonia Vaccine  Vaccine indicated:  Not indicated  If indicated was the vaccine given: not applicable    Influenza Vaccine (applicable from October through March):  Vaccine indicated: Not indicated  If indicated was the vaccine given: not applicable    Patient Education  Education completed on DVT prophylaxis: yes

## 2020-03-18 NOTE — PROGRESS NOTES
[address] [facility]    Occupational Therapy Evaluation  Date: 3/18/20  Patient Name: Baldo Freedman       Room:   MRN: [id number]  Account: [de-identified]   : 1940  ([de-identified] y.o.) Gender: female     Discharge Recommendations: The patient's needs are being met with no further therapy recommended at discharge. Equipment Needed: No    Referring Practitioner: Dr. Kaiden López  Diagnosis: CHF    Past Medical History:  has a past medical history of Atrial fibrillation ([address] Breast cancer, left breast ([address] CAD (coronary artery disease), CHF (congestive heart failure) ([address] Diabetes ([address] Diabetes ([address] Female bladder prolapse, GERD (gastroesophageal reflux disease), HLD (hyperlipidemia), Hypertension, Hypothyroid, Hypothyroidism, Idiopathic cardiomyopathy ([address] Kidney disease, Low back pain, Personal history of breast cancer, Type 2 diabetes mellitus with neurologic complication ([address] and Vision abnormalities. Past Surgical History:   has a past surgical history that includes Breast biopsy (Left, 13); Breast lumpectomy; Cataract removal with implant (Bilateral); Atrial ablation surgery; Cardiac defibrillator placement; Coronary angioplasty; and Appendectomy.     Restrictions  Restrictions/Precautions: Fall Risk, General Precautions(O2 at 3 L, peripheral IV right forearm)  Implants present? : Pacemaker(pacemaker/ AICD)  Other position/activity restrictions: up as tolerated  Required Braces or Orthoses?: No     Vitals  Temp: 97.5 °F (36.4 °C)  Pulse: 70  Resp: 16  BP: (!) 104/57  Height: 5' 3\" (160 cm)  Weight: 162 lb 0.6 oz (73.5 kg)  BMI (Calculated): 28.8  Oxygen Therapy  SpO2: 90 %  Pulse Oximeter Device Mode: Intermittent  Pulse Oximeter Device Location: Left  O2 Device: None (Room air)  Skin Protection for O2 Device: No  Level of Consciousness: Alert    Subjective  Subjective: \"I feel much better than I did last night\"  Overall Orientation Status: Within Functional Limits  Vision  Vision: Impaired  Vision Exceptions: Wears glasses at all times  Hearing  Hearing: Within functional limits  Social/Functional History  Lives With: Spouse(Son lives next door.)  Type of Home: House  Home Layout: Two level, Able to Live on Main level with bedroom/bathroom  Home Access: Level entry  Bathroom Shower/Tub: Tub/Shower unit  Bathroom Toilet: Handicap height  Bathroom Equipment: (Planning on getting GB for tub)  Bathroom Accessibility: Walker accessible  Home Equipment: [address], [place], Oxygen(2L at night)  Receives Help From: Family  ADL Assistance: Independent  Homemaking Assistance: Needs assistance(Son does most cooking/laundry/shopping)  Homemaking Responsibilities: No  Ambulation Assistance: Independent  Transfer Assistance: Independent  Active : No  Patient's  Info: Son or spouse  Occupation: Retired  Additional Comments: Pt reports that spouse is retired and son is on disability - both able to provide A as needed. Objective  Vision - Basic Assessment  Patient Visual Report: No visual complaint reported. Cognition  Overall Cognitive Status: WFL   Perception  Overall Perceptual Status: WFL  Sensation  Overall Sensation Status: WFL   ADL  Feeding: Independent(Poor appetite per patient)  Grooming: Setup  UE Bathing: Setup  LE Bathing: Stand by assistance  UE Dressing: Setup  LE Dressing: Stand by assistance  Toileting: Stand by assistance  Additional Comments: Pt don/doffed socks at EOB with minimal difficulty. Pt reports getting up to BR and completing toileting tasks with SBA and no balance/safety concerns. Other levels based on observation and clinical reasoning.     UE Function  LUE Strength  Gross LUE Strength: WFL  L Hand General: 4+/5     LUE Tone: Normotonic     LUE AROM (degrees)  LUE AROM : WFL     Left Hand AROM (degrees)  Left Hand AROM: WFL  RUE Strength  Gross RUE Strength: WFL  R Hand General: 4+/5      RUE Tone: Normotonic     RUE AROM (degrees)  RUE AROM : [facility] Right Hand AROM (degrees)  Right Hand AROM: WFL    Fine Motor Skills  Coordination  Movements Are Fluid And Coordinated: Yes     Mobility  Supine to Sit: Modified independent       Balance  Sitting Balance: Independent  Standing Balance: Stand by assistance     Functional Mobility  Functional Mobility Comments: Pt walked to door of room and around bed with RW and SBA. Bed mobility  Supine to Sit: Modified independent  Scooting: Independent  Comment: Pt left up in bedside chair at end of session. Transfers  Stand Step Transfers: Stand by assistance  Stand Pivot Transfers: Stand by assistance  Sit to stand: Stand by assistance  Stand to sit: Stand by assistance  Transfer Comments: w/RW  Toilet Transfers  Toilet Transfers Comments: Pt reports up to BR several times with SBA from nursing. Functional Activity Tolerance  Functional Activity Tolerance: Tolerates 10 - 20 min exercise with multiple rests  Additional Comments: On 3L O2 - sat 97% w/HR 71 at rest; sat 96% w/HR 81 after walk to door and around bed. Assessment  Assessment: Pt completing self-care tasks with set-up/SBA and functional mobility in room with SBA using RW. Pt reports good support system at home and no concerns re: BADL/IADL tasks at discharge. PT to address functional mobility, activity tolerance, and energy conservation. No need for skilled OT services at this time. Prognosis: Good  Decision Making: Low Complexity  REQUIRES OT FOLLOW UP: No  No Skilled OT: No OT goals identified  Activity Tolerance: Patient Tolerated treatment well    Goals  Patient Goals   Patient goals : Return home with A as needed from family.     Plan  Safety Devices  Safety Devices in place: Yes  Type of devices: Patient at risk for falls, Gait belt, Left in chair, Call light within reach    Equipment Recommendations  Equipment Needed: No  OT Individual Minutes  Time In: 0840  Time Out: 4809  Minutes: 16    Electronically signed by Oz Zelaya on 3/18/20 at

## 2020-03-18 NOTE — PROGRESS NOTES
Physical Therapy    Facility/Department: [facility] PROGRESSIVE CARE  Initial Assessment    NAME: Estefania Taylor  : 1940  MRN: [id number]    Date of Service: 3/18/2020    Discharge Recommendations:  Patient would benefit from continued therapy after discharge        Assessment   Body structures, Functions, Activity limitations: Decreased functional mobility ; Decreased endurance;Decreased strength  Assessment: continue per POC to maxmize potential for safe D/C  Treatment Diagnosis: impaired mobility due to weakness  Specific instructions for Next Treatment: 3- MOD I for bed mobility, transfers sit <> stand w/ SBA x 1, gait w/ w walker 15' and 25' w/ SBA x 1 and O2 at 3L  Prognosis: Good  Decision Making: Medium Complexity  History: admitted due to CHF  Exam: ROM, MMT, balance and mobility assessments  Clinical Presentation: MOD I for bed mobility, transfers sit <> stand w/ SBA x 1, gait w/ w walker 15' and 25' w/ SBA x 1 and O2 at 3L  PT Education: Goals;PT Role;Plan of Care  Barriers to Learning: none  REQUIRES PT FOLLOW UP: Yes  Activity Tolerance  Activity Tolerance: Patient limited by fatigue;Patient limited by endurance       Patient Diagnosis(es): The encounter diagnosis was Acute congestive heart failure, unspecified heart failure type ([address] has a past medical history of Atrial fibrillation ([address] Breast cancer, left breast ([address] CAD (coronary artery disease), CHF (congestive heart failure) ([address] Diabetes ([address] Diabetes ([address] Female bladder prolapse, GERD (gastroesophageal reflux disease), HLD (hyperlipidemia), Hypertension, Hypothyroid, Hypothyroidism, Idiopathic cardiomyopathy ([address] Kidney disease, Low back pain, Personal history of breast cancer, Type 2 diabetes mellitus with neurologic complication ([address] and Vision abnormalities. has a past surgical history that includes Breast biopsy (Left, 13); Breast lumpectomy; Cataract removal with implant (Bilateral);  Atrial ablation surgery; Cardiac defibrillator placement; Coronary angioplasty; and Appendectomy. Restrictions  Restrictions/Precautions  Restrictions/Precautions: Fall Risk, General Precautions(O2 at 3 L, peripheral IV right forearm)  Required Braces or Orthoses?: No  Implants present? : Pacemaker(pacemaker/ AICD)  Position Activity Restriction  Other position/activity restrictions: up as tolerated  Vision/Hearing  Vision: Impaired  Vision Exceptions: Wears glasses at all times  Hearing: Within functional limits     Subjective  General  Patient assessed for rehabilitation services?: Yes  Additional Pertinent Hx: hx breast CA  Response To Previous Treatment: Not applicable  Family / Caregiver Present: No  Referring Practitioner: Dr. Lilly Cook  Referral Date : 03/17/20  Diagnosis: CHF  Follows Commands: Within Functional Limits  Other (Comment): OK per nurse Harris to proceed w/ PT evaluation  General Comment  Comments: per chart, pt had C/O difficulty breathing and swelling in bilateral LEs upon admit. Subjective  Subjective: pt reports symptoms have been present for several days.   Pain Screening  Patient Currently in Pain: Denies  Vital Signs  Patient Currently in Pain: Denies       Orientation  Orientation  Overall Orientation Status: Within Normal Limits  Social/Functional History  Social/Functional History  Lives With: Spouse(Son lives next door.)  Type of Home: House  Home Layout: Two level, Able to Live on Main level with bedroom/bathroom  Home Access: Level entry  Bathroom Shower/Tub: Tub/Shower unit  Bathroom Toilet: Handicap height  Bathroom Equipment: (Planning on getting GB for tub)  Bathroom Accessibility: Walker accessible  Home Equipment: [address], Oxygen(2L at night)  Receives Help From: Family  ADL Assistance: Independent  Homemaking Assistance: Needs assistance(Son does most cooking/laundry/shopping)  Homemaking Responsibilities: No  Ambulation Assistance: Independent  Transfer Assistance: Independent  Active : No  Patient's  Info: Son or spouse  Occupation: Retired  Additional Comments: Pt reports that spouse is retired and son is on disability - both able to provide A as needed. Cognition        Objective     Observation/Palpation  Observation: O2 at 3 L, peripheral IV right forearm    AROM RLE (degrees)  RLE AROM: WFL  AROM LLE (degrees)  LLE AROM : WFL  AROM RUE (degrees)  RUE General AROM: see OT for UE assessment  AROM LUE (degrees)  LUE General AROM: see OT for UE assessment  Strength RLE  Comment: grossly 4/5   Strength LLE  Comment: grossly 4/5   Strength RUE  Comment: see OT for UE assessment  Strength LUE  Comment: see OT for UE assessment     Sensation  Overall Sensation Status: WFL(denies)  Bed mobility  Supine to Sit: Modified independent  Scooting: Independent  Comment: HOB elevated for above activities, pt left up in bedside chair at the end of treatment.   Transfers  Sit to Stand: Stand by assistance  Stand to sit: Stand by assistance  Stand Pivot Transfers: Stand by assistance(used w walker)  Ambulation  Ambulation?: Yes  Ambulation 1  Surface: level tile  Device: Rolling Walker  Other Apparatus: O2(3 L)  Assistance: Stand by assistance  Gait Deviations: Slow Tiara  Distance: 15' to doorway then 25' back to bedside chair  Comments: resting- supine  O2 sat 97% HR 71; O2 sat post gait 96% HR 81  Stairs/Curb  Stairs?: No     Balance  Sitting - Static: Good  Sitting - Dynamic: Good  Standing - Static: Good(used w walker)  Standing - Dynamic: Good;-(used w walker)        Plan   Plan  Times per week: 5-7 treatments/ week  Times per day: (5-7 treatments/ week)  Specific instructions for Next Treatment: 3- MOD I for bed mobility, transfers sit <> stand w/ SBA x 1, gait w/ w walker 15' and 25' w/ SBA x 1 and O2 at 3L  Current Treatment Recommendations: Strengthening, Gait Training, Patient/Caregiver Education & Training, Balance Training, Endurance Training, Functional Mobility Training, Transfer

## 2020-03-18 NOTE — CONSULTS
fluticasone (FLONASE) 50 MCG/ACT nasal spray 1 spray  1 spray Each Nostril Daily Hector Watson, APRN - CNP        magnesium sulfate 1 g in dextrose 5% 100 mL IVPB  1 g Intravenous PRN Marci Jensen MD        warfarin (COUMADIN) tablet 2 mg  2 mg Oral Once Roberto Carlos Velasco MD        aspirin EC tablet 81 mg  81 mg Oral QAM Roberto Carlos Velasco MD   81 mg at 03/18/20 0940    colchicine (COLCRYS) tablet 0.6 mg  0.6 mg Oral Daily Roberto Carlos Velasco MD        nitroGLYCERIN (NITROSTAT) SL tablet 0.4 mg  0.4 mg Sublingual Q5 Min PRN Roberto Carlos Velasco MD        midodrine (PROAMATINE) tablet 5 mg  5 mg Oral TID PRN Roberto Carlos Velasco MD        metoprolol succinate (TOPROL XL) extended release tablet 25 mg  25 mg Oral Daily Roberto Carlos Velasco MD   25 mg at 03/18/20 0947    levothyroxine (SYNTHROID) tablet 50 mcg  50 mcg Oral Daily Roberto Carlos Velasco MD        sodium chloride flush 0.9 % injection 10 mL  10 mL Intravenous 2 times per day Roberto Carlos Velasco MD   10 mL at 03/18/20 0940    sodium chloride flush 0.9 % injection 10 mL  10 mL Intravenous PRN Roberto Carlos Velasco MD        acetaminophen (TYLENOL) tablet 650 mg  650 mg Oral Q6H PRN Roberto Carlos Velasco MD   650 mg at 03/18/20 0039    Or    acetaminophen (TYLENOL) suppository 650 mg  650 mg Rectal Q6H PRN Roberto Carlos Velasco MD        polyethylene glycol (GLYCOLAX) packet 17 g  17 g Oral Daily PRN Roberto Carlos Velasco MD        potassium chloride (KLOR-CON M) extended release tablet 40 mEq  40 mEq Oral PRN Roberto Carlos Velasco MD   40 mEq at 03/18/20 0035    Or    potassium bicarb-citric acid (EFFER-K) effervescent tablet 40 mEq  40 mEq Oral PRN Roberto Carlos Velasco MD        Or    potassium chloride 10 mEq/100 mL IVPB (Peripheral Line)  10 mEq Intravenous PRN Roberto Carlos Velasco MD        furosemide (LASIX) injection 40 mg  40 mg Intravenous BID Roberto Carlos Velasco MD   40 mg at 03/18/20 0940    insulin lispro (HUMALOG) injection vial 0-12 Units  0-12 Units Subcutaneous TID  Roberto Carlos Velasco MD   2 Units

## 2020-03-18 NOTE — PLAN OF CARE
Problem: Falls - Risk of:  Goal: Will remain free from falls  Description: Will remain free from falls  Outcome: Ongoing   The patient remained free from falls this shift, call light within reach, bed in locked and lowest position. Side rails up x2. Continue to monitor closely.

## 2020-03-18 NOTE — CARE COORDINATION
BPCI-A Medical Bundle Patient:  Working DR  Admitting Location: [address] [place] [address]  Adm Date: 3/17/2020  Date of Discharge: ???    Bundle End Date: ???    90-day post-acute outreach and tracking with qualifying DRG.

## 2020-03-18 NOTE — PROGRESS NOTES
Dr Isaiah Osei (covering for dr Rosaura Blake ) notified of consult thru perfect serve at 11:58am 3/18

## 2020-03-18 NOTE — PROGRESS NOTES
75, 16, 97.5    #Acute on chronic combined systolic and diastolic congestive heart failure:  -Fluid restriction  -Lasix 40 mg IV twice daily,  -proBNP: 11,075  -cardiology consult  -BMP:K: 3.6, NA: 133, CL: 95  -CBC: WBC: 6.8,  -CXR: Increased right upper lobe perihilar opacities may be related to pulmonary edema versus an atypical infection     #Diabetes mellitus type 2:  -Medium dose insulin sliding scale  -Potassium sliding scale  -Hypoglycemia protocol  -Diabetic diet     #QRS prolongation 158  -EKG: QRS:158  -Telemetry  -Cardiology consult: Discuss biventricular pacemaker  -Do not order sotalol  -do not order Zofran     #Chronic kidney disease stage III  -creatinine: 0.94  -BMP:K: 3.6, NA: 133, CL: 95  -Lasix 40 mg IV twice daily     #Hypothyroidism:   -Levothyroxine 50 mcg daily     #A.  Fib:  -Pharmacy to dose warfarin  -AICD  -Hold sotalol for QRS prolongation  -Metoprolol 25 mg oral  -Telemetry     #DVT prophylaxis:  -Pharmacy to dose warfarin  -PT: 23.4, INR: 2.1     #PT/OT/SW    Evgeny Rosen MD  3/18/2020  2:33 PM

## 2020-03-19 NOTE — DISCHARGE SUMMARY
[address]    Discharge Summary     Patient ID: Janet Kaur  :  1940   MRN: [id number]     ACCOUNT:  [de-identified]   Patient's PCP: Frederick Varela MD  Admit Date: 3/17/2020   Discharge Date: 3/21/2020  Length of Stay: 4  Code Status:  Full Code  Admitting Physician: Jeremiah Chakraborty MD  Discharge Physician: Shalini Mota MD     Active Discharge Diagnoses:       Primary Problem  Congestive heart failure, NYHA class IV, acute on chronic, systolic [facility] Problems    Diagnosis Date Noted    Congestive heart failure, NYHA class IV, acute on chronic, systolic (HCC) [P79.05] 14/10/0159    FH: CHF (congestive heart failure) [Z82.49] 2020    Acute congestive heart failure ([facility] [I50.9] 2016       Admission Condition:  poor     Discharged Condition: good    Hospital Stay:       Hospital Course:  Janet Kaur is a [de-identified] y.o. female who was admitted for the management of Congestive heart failure, NYHA class IV, acute on chronic, systolic (HCC) , presented to ER with Shortness of Breath    Patient had rales, thought to be in acute respiratory distress secondary to chronic combined systolic diastolic heart failure with acute exacerbation. Patient was given 40 IV Lasix twice daily. Patient also had prolonged QRS in the ED. Sotalol was discontinued. Was advised to discontinue any QT prolonging agents including Zofran. Cardiology did see the patient, recommended just treatment with diuretics. Torsemide was added on 3/19/2020 in addition to the Lasix. Patient blood pressure was monitored closely. Patient was on 3 L during her stay, wean down to 2 L.   Patient is feeling much better today, she will be discharged home, follow-up outpatient, entertaining the idea of giving her Entresto      Significant therapeutic interventions: Cardiology consult, Lasix 40 mg IV,    Significant Diagnostic Studies: discharge is  31 mins in patient examination, evaluation, counseling as well as medication reconciliation, prescriptions for required medications, discharge plan and follow up. Electronically signed by   Kapil Benitez MD  3/19/2020  4:48 PM      Thank you Dr. Mart Bean MD for the opportunity to be involved in this patient's care.

## 2020-03-19 NOTE — PROGRESS NOTES
Pharmacy Note  Warfarin Consult follow-up      Recent Labs     03/17/20  1611 03/18/20  0958 03/19/20  0513   INR 2.2 2.1 2.4     Recent Labs     03/17/20  1611 03/18/20  0513 03/19/20  0513   HGB 12.1 11.5* 11.5*   HCT 36.9 33.5* 34.4*    189 185       Significant Drug-Drug Interactions:  New warfarin drug-drug interactions: none  Discontinued drug-drug interactions: none  Current warfarin drug-drug interactions: none      Date             INR        Dose given previous day  Dose scheduled for today  3/19/2020          2.4       2 mg         1 mg        Notes:                   INR = 2.4, was 2.1 yesterday. Patient here for CHF exacerbation. Will give warfarin 1 mg today. Daily PT/INR while inpatient. [facility]

## 2020-03-19 NOTE — PLAN OF CARE
Problem: Falls - Risk of:  Goal: Will remain free from falls  Description: Will remain free from falls  3/19/2020 1100 by Carmelo Barth RN  Outcome: Met This Shift     Problem: Musculor/Skeletal Functional Status  Goal: Absence of falls  3/19/2020 1100 by Carmelo Barth RN  Outcome: Met This Shift

## 2020-03-19 NOTE — PROGRESS NOTES
Nutrition Assessment (Low Risk)    Type and Reason for Visit: Positive Nutrition Screen(weight loss & poor intake)    Nutrition Recommendations: Continue diet as ordered. Nutrition Assessment:  Patient assessed for nutritional risk. Deemed to be at low risk at this time. Will continue to monitor for changes in status. Patient adequately nourished on admit with no significant weight loss. Malnutrition Assessment:  · Malnutrition Status: No malnutrition    Nutrition Risk Level   Risk Level: Low    Nutrition Diagnosis:   · Problem: No nutrition diagnosis at this time    Nutrition Intervention:  Food and/or Delivery: Continue current diet  Nutrition Education/Counseling/Coordination of Care:  Continued Inpatient Monitoring, Education Not Indicated      Nazanin Varela R.D. LJENNIFER.   Clinical Dietitian  Office: [phone]

## 2020-03-19 NOTE — PROGRESS NOTES
[address] [employer]    PROGRESS NOTE             3/19/2020    8:13 AM    Name:   Laly Gomes  MRN:     [id number]     Acct:      [de-identified]   Room:   [address] [place] [address] Day:  2  Admit Date:  3/17/2020  3:28 PM    PCP:  Montse Samuels MD  Code Status:  Full Code    Subjective:     C/C:   Chief Complaint   Patient presents with    Shortness of Breath     Interval History Status: not changed. Patient was seen and assessed at bedside on rounds this morning. Patient states that she feels her breathing has worsened but only slightly. Continues to be on 3 L nasal cannula. Denies dizziness headaches nausea vomiting abdominal pain fevers. Normal BM, urinating normally. Brief History:     CHF exacerbation    Review of Systems:     Review of Systems   Constitutional: Negative for chills, diaphoresis and fever. HENT: Negative. Respiratory: Positive for shortness of breath. Negative for cough and wheezing. Cardiovascular: Negative. Negative for chest pain. Gastrointestinal: Negative for abdominal pain, constipation, nausea and vomiting. Genitourinary: Negative for difficulty urinating and dysuria. Neurological: Negative. Negative for dizziness and headaches. Medications: Allergies:     Allergies   Allergen Reactions    Atorvastatin Other (See Comments)     Muscle cramps      Ciprofloxacin Itching    Other     Penicillins Diarrhea    Simvastatin Other (See Comments)     Muscle cramps      Statins     Sulfa Antibiotics        Current Meds:   Scheduled Meds:    spironolactone  25 mg Oral Daily    fluticasone  1 spray Each Nostril Daily    aspirin  81 mg Oral QAM    colchicine  0.6 mg Oral Daily    metoprolol succinate  25 mg Oral Daily    levothyroxine  50 mcg Oral Daily    sodium chloride flush  10 mL Intravenous 2 times per day    furosemide  40 mg Intravenous BID    insulin lispro  0-12 Units Subcutaneous Summary (Last 24 hours) at 3/19/2020 0813  Last data filed at 3/19/2020 0524  Gross per 24 hour   Intake --   Output 1550 ml   Net -1550 ml       Labs:  [unfilled]    Lab Results   Component Value Date/Time    SPECIAL NOT REPORTED 03/11/2020 01:12 PM     Lab Results   Component Value Date/Time    CULTURE NO SIGNIFICANT GROWTH 03/11/2020 01:12 PM       [unfilled]    Radiology:    Xr Chest Standard (2 Vw)    Result Date: 3/18/2020  EXAMINATION: TWO XRAY VIEWS OF THE CHEST 3/18/2020 9:16 am COMPARISON: 03/17/2020 HISTORY: ORDERING SYSTEM PROVIDED HISTORY: follow up imaging TECHNOLOGIST PROVIDED HISTORY: follow up imaging Reason for Exam: sob, follow up imaging Acuity: Unknown Type of Exam: Subsequent/Follow-up FINDINGS: Cardiac silhouette is borderline enlarged. Dense atherosclerotic calcifications of the aortic arch. Implantable cardiac device leads overlying the right atrium and ventricle. Multifocal mixed interstitial airspace opacities are identified primarily within the upper lobes bilaterally. Small left pleural effusion adjacent atelectasis/airspace disease. Osseous structures and soft tissues are grossly intact. Multifocal mixed interstitial and airspace opacities within the upper lobes bilaterally, essentially unchanged as compared to previous examination. Xr Chest Standard (2 Vw)    Result Date: 3/17/2020  EXAMINATION: TWO XRAY VIEWS OF THE CHEST 3/17/2020 5:25 pm COMPARISON: 1/3/2020 HISTORY: ORDERING SYSTEM PROVIDED HISTORY: sob TECHNOLOGIST PROVIDED HISTORY: sob Reason for Exam: Short of breath Acuity: Chronic Type of Exam: Ongoing Additional signs and symptoms: Short of breath FINDINGS: Left AICD. Chronic interstitial lung changes. Increased right upper lobe opacity. Increased perihilar opacities. Cardiomegaly. Increased right upper lobe perihilar opacities may be related to pulmonary edema versus an atypical infection.          Physical Examination:        Physical Exam  Vitals signs

## 2020-03-19 NOTE — PLAN OF CARE
Pt assessed as a fall risk this shift. Remains free from falls and accidental injury at this time. Fall precautions in place, including falling star sign and fall risk band on pt. Floor free from obstacles, and bed is locked and in lowest position. Adequate lighting provided. Pt encouraged to call before getting OOB for any need. Bed alarm activated. Will continue to monitor needs during hourly rounding, and reinforce education on use of call light.

## 2020-03-20 NOTE — PROGRESS NOTES
Pharmacy Note  Warfarin Consult follow-up      Recent Labs     03/18/20  0958 03/19/20  0513 03/20/20  0529   INR 2.1 2.4 2.8     Recent Labs     03/18/20  0513 03/19/20  0513 03/20/20  0529   HGB 11.5* 11.5* 11.2*   HCT 33.5* 34.4* 33.6*    185 193       Significant Drug-Drug Interactions:  New warfarin drug-drug interactions: none  Discontinued drug-drug interactions: none  Current warfarin drug-drug interactions: none      Date             INR        Dose given previous day  Dose scheduled for today  3/20/2020           2.8       1 mg          none        Notes:                   INR = 2.8, was 2.4 yesterday, significantly increase in INR, will hold warfarin today as expect INR to be higher tomorrow. Review INR tomorrow, redose if necessary. Daily PT/INR while inpatient. Elda Bowden Pharm. [facility]

## 2020-03-20 NOTE — PLAN OF CARE
Problem: Falls - Risk of:  Goal: Will remain free from falls  Description: Will remain free from falls  3/20/2020 0430 by Annamarie Doss RN  Outcome: Ongoing  Patient remained free of falls. Call light within reach, side rails up x2, bedside table in reach. Room free of clutter. Problem: Musculor/Skeletal Functional Status  Goal: Highest potential functional level  3/20/2020 0430 by Annamarie Doss RN  Outcome: Ongoing   Patient able to walk to bathroom with just a standby. Repositions self in bed without assistance. Problem: Nutrition  Goal: Optimal nutrition therapy  3/20/2020 0430 by Annamarie Doss RN    Outcome: Ongoing  Problem: HEMODYNAMIC STATUS  Goal: Patient has stable vital signs and fluid balance  Outcome: Ongoing     Problem: OXYGENATION/RESPIRATORY FUNCTION  Goal: Patient will achieve/maintain normal respiratory rate/effort  Description: Respiratory rate and effort will be within normal limits for the patient  Outcome: Ongoing   Patient O2 sat within normal range throughout shift on 2 L NC.      Problem: FLUID AND ELECTROLYTE IMBALANCE  Goal: Fluid and electrolyte balance are achieved/maintained  Outcome: Ongoing     Problem: ACTIVITY INTOLERANCE/IMPAIRED MOBILITY  Goal: Mobility/activity is maintained at optimum level for patient  Outcome: Ongoing

## 2020-03-20 NOTE — PROGRESS NOTES
0-12 Units Subcutaneous TID WC    insulin lispro  0-6 Units Subcutaneous Nightly    warfarin (COUMADIN) daily dosing (placeholder)   Other RX Placeholder     Continuous Infusions:    dextrose       PRN Meds: sodium chloride, magnesium sulfate, nitroGLYCERIN, midodrine, sodium chloride flush, acetaminophen **OR** acetaminophen, polyethylene glycol, potassium chloride **OR** potassium alternative oral replacement **OR** potassium chloride, glucose, dextrose, glucagon (rDNA), dextrose    Data:     Past Medical History:   has a past medical history of Atrial fibrillation ([address] Breast cancer, left breast ([address] CAD (coronary artery disease), CHF (congestive heart failure) ([address] Diabetes ([address] Diabetes ([address] Female bladder prolapse, GERD (gastroesophageal reflux disease), HLD (hyperlipidemia), Hypertension, Hypothyroid, Hypothyroidism, Idiopathic cardiomyopathy ([facility] Kidney disease, Low back pain, Personal history of breast cancer, Type 2 diabetes mellitus with neurologic complication ([facility] and Vision abnormalities. Social History:   reports that she quit smoking about 21 years ago. She has a 5.00 pack-year smoking history. She has never used smokeless tobacco. She reports that she does not drink alcohol or use drugs.      Family History:   Family History   Problem Relation Age of Onset    Heart Disease Father     Stroke Father     Hypertension Father     Heart Failure Mother     Breast Cancer Neg Hx     Cancer Neg Hx     Colon Cancer Neg Hx     Diabetes Neg Hx     Eclampsia Neg Hx     Ovarian Cancer Neg Hx      Labor Neg Hx     Spont Abortions Neg Hx        Vitals:  BP 97/60   Pulse 74   Temp 98 °F (36.7 °C) (Axillary)   Resp 18   Ht 5' 3\" (1.6 m)   Wt 161 lb 13.1 oz (73.4 kg)   SpO2 94%   BMI 28.66 kg/m²   Temp (24hrs), Av.7 °F (36.5 °C), Min:97.3 °F (36.3 °C), Max:98 °F (36.7 °C)    Recent Labs     20  1148 20  1633 20  2032 20  0723   POCGLU 201* 171* 194* 75       I/O(24Hr): Intake/Output Summary (Last 24 hours) at 3/20/2020 0753  Last data filed at 3/20/2020 9107  Gross per 24 hour   Intake 730 ml   Output 2100 ml   Net -1370 ml       Labs:  [unfilled]    Lab Results   Component Value Date/Time    SPECIAL NOT REPORTED 03/11/2020 01:12 PM     Lab Results   Component Value Date/Time    CULTURE NO SIGNIFICANT GROWTH 03/11/2020 01:12 PM       [unfilled]    Radiology:    Xr Chest Standard (2 Vw)    Result Date: 3/18/2020  EXAMINATION: TWO XRAY VIEWS OF THE CHEST 3/18/2020 9:16 am COMPARISON: 03/17/2020 HISTORY: ORDERING SYSTEM PROVIDED HISTORY: follow up imaging TECHNOLOGIST PROVIDED HISTORY: follow up imaging Reason for Exam: sob, follow up imaging Acuity: Unknown Type of Exam: Subsequent/Follow-up FINDINGS: Cardiac silhouette is borderline enlarged. Dense atherosclerotic calcifications of the aortic arch. Implantable cardiac device leads overlying the right atrium and ventricle. Multifocal mixed interstitial airspace opacities are identified primarily within the upper lobes bilaterally. Small left pleural effusion adjacent atelectasis/airspace disease. Osseous structures and soft tissues are grossly intact. Multifocal mixed interstitial and airspace opacities within the upper lobes bilaterally, essentially unchanged as compared to previous examination. Xr Chest Standard (2 Vw)    Result Date: 3/17/2020  EXAMINATION: TWO XRAY VIEWS OF THE CHEST 3/17/2020 5:25 pm COMPARISON: 1/3/2020 HISTORY: ORDERING SYSTEM PROVIDED HISTORY: sob TECHNOLOGIST PROVIDED HISTORY: sob Reason for Exam: Short of breath Acuity: Chronic Type of Exam: Ongoing Additional signs and symptoms: Short of breath FINDINGS: Left AICD. Chronic interstitial lung changes. Increased right upper lobe opacity. Increased perihilar opacities. Cardiomegaly. Increased right upper lobe perihilar opacities may be related to pulmonary edema versus an atypical infection.          Physical following  -Avoid QT prolonging agents  - Toprol 25 mg daily  -Patient on Coumadin pharmacy to dose     *Chronic kidney disease stage III  -Creatinine elevated to 1.07, baseline appears to be 0.94  - Monitor creatinine, we are giving Lasix 40 IV twice daily     *Hypothyroidism  - Synthroid 50 mcg daily     *Type 2 diabetes mellitus  -Medium dose insulin sliding scale     Code: Full  DVT: Coumadinr  Diet: Cardiac    María Elena Coker MD  3/20/2020  7:53 AM     Attending Physician Statement  I have discussed the care of Skinny Reid with the resident team. I have examined the patient myself and taken ros and hpi , including pertinent history and exam findings,  with the resident. I have reviewed the key elements of all parts of the encounter with the resident. I agree with the assessment, plan and orders as documented by the resident. Acute on chronic combined systolic diastolic heart failure- improving with IV diuresis, p.o. torsemide added yesterday resulting in ample diuresis and reduced oxygen requirements. However patient did become hypotensive and orthostatic. Torsemide discontinued. Continues to have bibasilar rhonchi. We will monitor through today. Potential discharge tomorrow.       Electronically signed by Marva Monae MD

## 2020-03-20 NOTE — PROGRESS NOTES
Physical Therapy  Facility/Department: [facility]  Daily Treatment Note  NAME: Marc Walker  : 1940  MRN: [id number]    Date of Service: 3/20/2020    Discharge Recommendations:  Patient would benefit from continued therapy after discharge        Assessment      REQUIRES PT FOLLOW UP: Yes  Activity Tolerance  Activity Tolerance: Patient limited by endurance     Patient Diagnosis(es): The encounter diagnosis was Acute congestive heart failure, unspecified heart failure type ([address] has a past medical history of Atrial fibrillation ([address] Breast cancer, left breast ([address] CAD (coronary artery disease), CHF (congestive heart failure) ([address] Diabetes ([address] Diabetes ([address] Female bladder prolapse, GERD (gastroesophageal reflux disease), HLD (hyperlipidemia), Hypertension, Hypothyroid, Hypothyroidism, Idiopathic cardiomyopathy ([address] Kidney disease, Low back pain, Personal history of breast cancer, Type 2 diabetes mellitus with neurologic complication ([address] and Vision abnormalities. has a past surgical history that includes Breast biopsy (Left, 13); Breast lumpectomy; Cataract removal with implant (Bilateral); Atrial ablation surgery; Cardiac defibrillator placement; Coronary angioplasty; and Appendectomy.     Restrictions  Restrictions/Precautions  Restrictions/Precautions: Fall Risk, General Precautions(O2 at 3 L, peripheral IV right forearm)  Required Braces or Orthoses?: No  Implants present? : Pacemaker(pacemaker/ AICD)  Position Activity Restriction  Other position/activity restrictions: up as tolerated  Subjective   General  Additional Pertinent Hx: hx breast CA  Response To Previous Treatment: Not applicable  Family / Caregiver Present: No  Referring Practitioner: Dr. Jcarlos Garnica  Subjective  Subjective: Pt sitting in bedside chair, agreeable to tx   General Comment  Comments: GEOVANNI gamboadtx  Pain Screening  Patient Currently in Pain: Denies  Vital Signs  Patient Currently in Pain:

## 2020-03-20 NOTE — PROGRESS NOTES
Dr. Lorna Nevarez notified of 85/32 BP even after giving 5mg midodrine. Patient reports small bit of dizziness. Dr. Lorna Nevarez ordered 250 mL NaCl bolus at 100 mL/hr.    Electronically signed by Stevie Marin RN on 3/20/2020 at 1:23 AM

## 2020-03-20 NOTE — CARE COORDINATION
Writer tried scheduling a follow up appt due to High readmit risk, however was advised by [facility] from [facility] that they are unable to schedule any appts at this time due to  Covid 19 , however will reach out to manager and they will contact patient to schedule the appt.     Electronically signed by Bessie Coates RN on 3/20/2020 at 1:48 PM

## 2020-03-20 NOTE — PROGRESS NOTES
[address], [address] [address]  [phone]          Progress Note    Patient Name:  Lyndle Severance    :  1940  3/20/2020 3:22 PM      SUBJECTIVE       Ms. Angelo Lewis  has no chest pain, shortness of breath, palpitations, nausea or vomiting      OBJECTIVE     Vital signs:    BP 94/60   Pulse 74   Temp 98.2 °F (36.8 °C) (Oral)   Resp 17   Ht 5' 3\" (1.6 m)   Wt 161 lb 13.1 oz (73.4 kg)   SpO2 95%   BMI 28.66 kg/m²  2 L/min  .tro    Admit Weight:  157 lb (71.2 kg)    Last 3 weights: Wt Readings from Last 3 Encounters:   20 161 lb 13.1 oz (73.4 kg)   20 159 lb (72.1 kg)   20 167 lb (75.8 kg)       BMI: Body mass index is 28.66 kg/m².     Input/Output:       Intake/Output Summary (Last 24 hours) at 3/20/2020 1522  Last data filed at 3/20/2020 0949  Gross per 24 hour   Intake 730 ml   Output 2200 ml   Net -1470 ml       Date 20 0000 - 20 2359   Shift 4585-8559 0370-1094 4015-2340 24 Hour Total   INTAKE   P.O.(mL/kg/hr) 480(0.8)   480   IV Piggyback(mL/kg) 250(3.4)   250(3.4)   Shift Total(mL/kg) 730(9.9)   730(9.9)   OUTPUT   Urine(mL/kg/hr) 800(1.4) 500  1300   Shift Total(mL/kg) 800(10.9) 500(6.8)  1300(17.7)   Weight (kg) 73.4 73.4 73.4 73.4     Exam:     General appearance: awake and alert moves all ext   Lungs: no rhonchi, no wheezes, no rales  Heart: S1 and S2 no murmur  Abdomen: positive bowel sounds, no bruits, no masses  Extremities: warm and dry, no cyanosis, no clubbing        Laboratory Studies:     CBC:   Recent Labs     20  0513 20  0513 20  0529   WBC 6.8 6.2 5.7   HGB 11.5* 11.5* 11.2*   HCT 33.5* 34.4* 33.6*   MCV 99.3 99.6 100.0    185 193     BMP:   Recent Labs     20  0513 20  0513 20  0529   * 137 136   K 3.6* 3.9 3.4*   CL 95* 97* 96*   CO2 25 30 28   BUN 17 18 19   CREATININE 0.94* 1.07* 1.08*     PT/INR:   Recent Labs     20  0958 20  5181 teaching and close follow-up.       Electronically signed by Vijaya Greenwood MD on 3/20/2020 at 3:22 PM

## 2020-03-21 NOTE — PROGRESS NOTES
[facility])        Progress Note    3/21/2020 1:00 PM      Subjective:  Ms. Tye Su states she is feeling better less dyspneic            LABS:     CBC:   Recent Labs     20  0513 20  0529 20  0449   WBC 6.2 5.7 6.6   HGB 11.5* 11.2* 12.0   HCT 34.4* 33.6* 35.7*   MCV 99.6 100.0 99.9    193 206     BMP:   Recent Labs     20  0513 20  0529 20  044    136 135   K 3.9 3.4* 3.8   CL 97* 96* 96*   CO2 30 28 27   BUN 18  20   CREATININE 1.07* 1.08* 0.96*     PT/INR:   Recent Labs     20  0520  0529 20   PROTIME 26.4* 29.7* 27.0*   INR 2.4 2.8 2.5     APTT: No results for input(s): APTT in the last 72 hours. MAG:   Recent Labs     20  05   MG 1.7     D Dimer: No results for input(s): DDIMER in the last 72 hours. Troponin: No results for input(s): Dimitri Hilario in the last 72 hours. PROBNP   Recent Labs     20   PROBNP 8,239*       Pulse Ox: SpO2  Av.3 %  Min: 95 %  Max: 100 %  Supplemental O2: O2 Flow Rate (L/min): 2 L/min     Current Meds:    warfarin  2 mg Oral Once    furosemide  40 mg Oral BID    sotalol  80 mg Oral BID    spironolactone  25 mg Oral Daily    fluticasone  1 spray Each Nostril Daily    aspirin  81 mg Oral QAM    colchicine  0.6 mg Oral Daily    metoprolol succinate  25 mg Oral Daily    levothyroxine  50 mcg Oral Daily    sodium chloride flush  10 mL Intravenous 2 times per day    insulin lispro  0-12 Units Subcutaneous TID WC    insulin lispro  0-6 Units Subcutaneous Nightly    warfarin (COUMADIN) daily dosing (placeholder)   Other RX Placeholder     Continuous Infusions:    dextrose            VITAL SIGNS:    BP 96/65   Pulse 73   Temp 98.3 °F (36.8 °C) (Oral)   Resp 16   Ht 5' 3\" (1.6 m)   Wt 166 lb 0.1 oz (75.3 kg)   SpO2 100%   BMI 29.41 kg/m²  2 L/min      Admit Weight:  157 lb (71.2 kg)    Last 3 weights:   Wt Readings from Last 3

## 2020-03-21 NOTE — DISCHARGE INSTR - COC
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.39    Female cystocele 2-3 N81.10    Pessary maintenance Z46.89    Breast cancer, left ([facility] C50.912    Personal history of breast cancer Z85.3    GERD (gastroesophageal reflux disease) K64.8    Systolic CHF ([facility] R29.28    CKD (chronic kidney disease) stage 3, GFR 30-59 ml/min ([facility]) N18.3    Idiopathic cardiomyopathy ([facility]) I42.8    HLD (hyperlipidemia) E78.5    Coronary artery disease involving native coronary artery of native heart without angina pectoris I25.10    Essential hypertension I10    Type 2 diabetes mellitus with neurologic complication ([facility]) S04.34    UTI (urinary tract infection) N39.0    Acute congestive heart failure ([facility]) I50.9    Supratherapeutic INR R79.1    Hypertensive heart disease I11.9    Pacemaker Z95.0    ICD (implantable cardioverter-defibrillator) in place Z95.810    Acquired hypothyroidism E03.9    Chronic atrial fibrillation I48.20    Low back pain M54.5    Syncope and collapse R55    Chronic gout of right ankle M1A.0710    VT (ventricular tachycardia) ([facility]) I47.2    ICD (implantable cardioverter-defibrillator) discharge Z45.02    Defibrillator discharge Z45.02    Acute cystitis without hematuria N30.00    Congestive heart failure, NYHA class IV, acute on chronic, systolic ([facility]) Y27.77    FH: CHF (congestive heart failure) Z82.49       Isolation/Infection:   Isolation          No Isolation        Patient Infection Status     None to display          Nurse Assessment:  Last Vital Signs: BP 96/65   Pulse 73   Temp 98.3 °F (36.8 °C) (Oral)   Resp 16   Ht 5' 3\" (1.6 m)   Wt 166 lb 0.1 oz (75.3 kg)

## 2020-03-21 NOTE — CARE COORDINATION
[facility] [address] Encounter Date/Time: 3/17/2020 Myron Account: [de-identified]    MRN: [id number]    Patient: Rudolph Hallman    Contact Serial #: [id number]      ENCOUNTER          Patient Class: I Private Enc? No Unit RM BD: [facility] PROG    Hospital Service: Intermediate   ADM DX: Congestive heart failure*   ADM Provider: Rosie Luther MD   Procedure:     ATT Provider: Rosie Luther MD   REF Provider:        PATIENT                 Name: Rudolph Hallman : 1940 (79 yrs)   Address: [address] [place] [address] Sex: Female   City: [address] [place] [id number]         Marital Status:    Employer: RETIRED         Faith: Nallely Cervantes [employer] [place]   Primary Care Provider: Tila Craft MD         Primary Phone: [phone]   EMERGENCY CONTACT   Contact Name Legal Guardian? Relationship to Patient Home Phone Work Phone   1. Joselito Fernandez  2. [facility]      Spouse  Child [phone]              GUARANTOR            Guarantor: Rudolph Hallman     : 1940   Address: [address] [place] [address] Sex: [place]     [place] [address]     Relation to Patient: Self       Home Phone: [phone]   Guarantor ID: [id number]       Work Phone:     Guarantor Employer: RETIRED         Status: RETIRED      COVERAGE        PRIMARY INSURANCE   Payor: MEDICARE Plan: [employer] MEDICARE   Payor Address[address]  [address] [facility] [address] [facility] [address]       Group Number:   Insurance Type: INDEMNITY   Subscriber Name: Mason Rodrigues : 1940   Subscriber ID: [id number] Pat. Rel. to Sub: Self   SECONDARY INSURANCE   Payor: [employer] Plan: [employer] [id number]  Address:  [id number], [address]          Group Number: [id number] Insurance Type: [address] Name: Tita Estes : 1938   Subscriber ID: [id number] Pat.  Rel. to Sub: SPOUSE         [id number]         CSN                                    Req/Control # [Problem retrieving Specimen ID] Order Date:  Mar 21, 2020  [id number]                                          Patient Information      Name:  Destiny Owens  :  1940  Age:  [de-identified] y.o. Address:  [place], [place]   Zip:  [address]  PCP: Liya Best MD Sex:  F  SSN: [id number]  Home Phone: [phone]  Work Phone:    Patient MRN:  [id number]    Alt Patient ID:  [id number]  PCP Phone: [phone]       Authorizing Provider Information       AUTHORIZING PROVIDER: Veronica Stark MD  Physician ID: [id number]  NPI:  [id number]  Site:   Address:  [address] [place] [address] Street: [address] [place] [address]  Phone: [phone]  Fax: [phone]              EQUIPMENT ORDERED  DME Order for Danni Diaz as OP Valorie Knows (ORD   #:   [id number]) Priority  Routine Class  Hospital Performed        Associated Diagnosis:  Acute combined systolic and diastolic congestive heart failure ([facility] (I50.41 [ICD-10-CM]); Syncope and collapse (R55 [ICD-10-CM])        Comments:    You must complete the order parameters below and add the medical necessity documentation for this DME in a separate note.     Folding Walker with Wheels     Current patient weight: Weight: 166 lb 0.1 oz (75.3 kg)  Current patient height: Height: 5' 3\" (160 cm)  Diagnosis: CHF, WEAKNESS  Duration: Purchase            Scheduling Instructions:                                 Specimen Source             Collection Date    Collection Time    Order Status    Expected Date                Electronically Signed By  Veronica Stark MD Date  Mar 21, 2020           Responsible Party Blanca Castaneda-ActID   Relationship Account Type Home Phone   Lashawn Coy [phone] / [place], [address] [facility] Self P/F [phone]   Employer   Work Phone   [address] [facility]     Primary Insurance  Insurance/Subscriber ID:  [id number] Name:  Kaci Lopez

## 2020-03-21 NOTE — PROGRESS NOTES
Physical Therapy  Facility/Department: [facility] PROGRESSIVE CARE  Daily Treatment Note  NAME: Peewee Diaz  : 1940  MRN: [id number]    Date of Service: 3/21/2020    Discharge Recommendations:  Patient would benefit from continued therapy after discharge        Assessment      REQUIRES PT FOLLOW UP: Yes  Activity Tolerance  Activity Tolerance: Patient limited by endurance  Other Comments  Comments: Tx limited to Lunch arriving, therapist returned at [phone] offering to continue tx. Pt informs writer she may be d/c today and declines further tx     Patient Diagnosis(es): The primary encounter diagnosis was Acute congestive heart failure, unspecified heart failure type ([address] Diagnoses of Acute combined systolic and diastolic congestive heart failure (HCC) and Syncope and collapse were also pertinent to this visit. has a past medical history of Atrial fibrillation ([address] Breast cancer, left breast ([address] CAD (coronary artery disease), CHF (congestive heart failure) ([address] Diabetes ([address] Diabetes ([address] Female bladder prolapse, GERD (gastroesophageal reflux disease), HLD (hyperlipidemia), Hypertension, Hypothyroid, Hypothyroidism, Idiopathic cardiomyopathy ([address] Kidney disease, Low back pain, Personal history of breast cancer, Type 2 diabetes mellitus with neurologic complication ([address] and Vision abnormalities. has a past surgical history that includes Breast biopsy (Left, 13); Breast lumpectomy; Cataract removal with implant (Bilateral); Atrial ablation surgery; Cardiac defibrillator placement; Coronary angioplasty; and Appendectomy.     Restrictions  Restrictions/Precautions  Restrictions/Precautions: Fall Risk, General Precautions  Required Braces or Orthoses?: No  Implants present? : Pacemaker(pacemaker/ AICD)  Position Activity Restriction  Other position/activity restrictions: up as tolerated  Subjective   General  Additional Pertinent Hx: hx breast CA  Response To Previous Treatment: Not applicable  Family / Caregiver Present: No  Referring Practitioner: Dr. Zach South: Pt sitting in bedside chair, states she is tired from being in the hospital  General Comment  Comments: RN Muna Brewster ok'd tx  Pain Screening  Patient Currently in Pain: Denies  Vital Signs  Level of Consciousness: Alert  Patient Currently in Pain: Denies       Orientation  Orientation  Overall Orientation Status: Within Normal Limits  Cognition      Objective      Transfers  Comment: pt remains in chair throughout tx  Ambulation  Ambulation?: No  More Ambulation?: No  Comments: Pt politely declines amb  Stairs/Curb  Stairs?: No     Balance  Sitting - Static: Good  Sitting - Dynamic: Good  Comments: seated in bedside chair  Other exercises  Other exercises?: Yes  Other exercises 1: BLE seated ex's x10 with orange Tband         Comment: Rest breaks PRN     Goals  Short term goals  Time Frame for Short term goals: 5-7 treatments/ week  Short term goal 1: pt to tolerate 1/2 hour of therapuetic exercise and activity taking frequent rest breaks as needed  Short term goal 2: pt to demonstrate good technique for LE strengthening exercises, balance activities and energy conservation techniques  Short term goal 3: pt to demonstrate independent bed mobility for rolling and supine <> sit transfers   Short term goal 4: pt to demonstrate MODIFIED INDEPENDENT transfers sit <> stand and bed <> chair using w walker   Short term goal 5: pt to demonstrate MODIFIED INDEPENDENT gait 50-80'  using w walker   Short term goal 6: pt to demonstrate good balance  using w walker   Patient Goals   Patient goals : return home    Plan    Plan  Times per week: 5-7 treatments/ week  Current Treatment Recommendations: Strengthening, Gait Training, Patient/Caregiver Education & Training, Balance Training, Endurance Training, Functional Mobility Training, Transfer Training, Safety Education & Training  Safety Devices  Type of devices: Call light within reach, Gait belt, Patient at risk for falls, Left in chair     Therapy Time         03/21/20 1310   PT Individual Minutes   Time In 1142   Time Out 1156   Minutes [address] [facility] [place] [facility] , [place]

## 2020-03-21 NOTE — PLAN OF CARE
Problem: Falls - Risk of:  Goal: Will remain free from falls  Description: Will remain free from falls  Outcome: Ongoing  Note: Pt. Free of falls and injuries this shift. Problem: OXYGENATION/RESPIRATORY FUNCTION  Goal: Patient will maintain patent airway  Outcome: Ongoing  Note: Maintained adequate oxygen saturations. Encouraged C and DB and incentive spirometry. Oxygen therapy as needed. See head to toe assessment.       Problem: HEMODYNAMIC STATUS  Goal: Patient has stable vital signs and fluid balance  Outcome: Ongoing     Problem: FLUID AND ELECTROLYTE IMBALANCE  Goal: Fluid and electrolyte balance are achieved/maintained  Outcome: Ongoing     Problem: ACTIVITY INTOLERANCE/IMPAIRED MOBILITY  Goal: Mobility/activity is maintained at optimum level for patient  Outcome: Ongoing

## 2020-03-23 NOTE — TELEPHONE ENCOUNTER
[facility] Trans[facility] Initial Follow Up Call Yes    Outreach made within 2 business days of discharge: 3-    Patient: Nemesio Sales Patient : 1940   MRN: [id number]  Reason for Admission: CHF AND HTN  Discharge Date: 3/21/20       Spoke with: Kennedi Ponce    Discharge department/facility: [place] [address] Interactive Patient Contact: yes  Was patient able to fill all prescriptions:yes  Was patient instructed to bring all medications to the follow-up visit: No   Is patient taking all medications as directed in the discharge summary YES   Does patient understand their discharge instructions:YES   Does patient have questions or concerns that need addressed prior to 7-14 day follow up office visit: NO    Scheduled appointment with PCP within 7-14 days 3-      Follow Up 3-  No future appointments.     Thea Hubbard MA

## 2020-03-25 PROBLEM — E03.9 HYPOTHYROIDISM: Status: RESOLVED | Noted: 2020-01-01 | Resolved: 2020-01-01

## 2020-03-25 NOTE — CARE COORDINATION
Patient called stating she is starting to get UTI symptoms again and wants to know if you can order something for her. Frequent urination, burning. Pharmacy [facility] in [place]    Precautions reviewed for COVID-19 per [employer] often with soap and water for at least 20 seconds  When soap is not available us hand  with at least 70% alcohol  Avoid touching your face  Avoid close contact with others  Maintain 6 feet distance if possible    If you are sick:  Cover mouth and nose when coughing or sneezing and then wash hands  Wear a mask when around others  Clean and disinfect surfaces often with soap or detergent and water.

## 2020-03-27 NOTE — CARE COORDINATION
Patient called stating that the medication that she started for anxiety and sleep is way to strong. It caused her to slur her speech and feel intoxicated. She asked if there is something not so strong to help her. She is getting anxious in the evening and wants something but can not take this.

## 2020-03-30 NOTE — TELEPHONE ENCOUNTER
Patient and son notified hospital bed faxed to [facility] and home care order sent to [facility].

## 2020-04-02 PROBLEM — I50.43 CHF (CONGESTIVE HEART FAILURE), NYHA CLASS IV, ACUTE ON CHRONIC, COMBINED (HCC): Status: ACTIVE | Noted: 2020-01-01

## 2020-04-02 PROBLEM — R94.31 PROLONGED Q-T INTERVAL ON ECG: Status: ACTIVE | Noted: 2020-01-01

## 2020-04-02 NOTE — CONSULTS
..    Palliative Care Inpatient Consult    NAME:  [id number] RECORD NUMBER:  [id number]  AGE: [de-identified] y.o. GENDER: female  : 1940  TODAY'S DATE:  2020    Reasons for Consultation:    Symptom and/or pain management  Provision of information regarding PC and/or hospice philosophies  Complex, time-intensive communication and interdisciplinary psychosocial support  Clarification of goals of care and/or assistance with difficult decision-making  Guidance in regards to resources and transition(s)    Members of PC team contributing to this consultation are :  Alice Whatley CNP   History of Present Illness     The patient is a [de-identified] y.o. Non-/non  female who presents with Shortness of Breath      Referred to Palliative Care by   [x] Physician   [] Nursing  [] Family Request   [] Other:       She was admitted to the primary service for CHF (congestive heart failure), NYHA class IV, acute on chronic, combined ([address] [I50.43]. Her hospital course has been associated with CHF (congestive heart failure), NYHA class IV, acute on chronic, combined ([address] The patient has a complicated medical history and has been hospitalized since 2020  6:56 AM. Mirna Ling is a [de-identified]year old female that is a full code that came to ER with shortness of breath. She was admitted today to the progressive unit for exacerbation of CHF. She has had multiple hospitalizations since January due to this condition. She was recently discharged about 2 weeks ago home with [facility] care and has a hospital bed at home. She lives with  Roberto Carlos Pollard and son lives next door and provides care to his mom. Patient states she has difficulties at home due to the shortness of breath, not feeling well and her anxiety level. She is very frustrated about her current health status. She has been started on Lasix BID due to the CHF. She states the oxygen helps a little with shortness of breath. Cardiology was consulted due to CHF.  Palliative care was tablet by mouth nightly for 30 days.  30 tablet 0    potassium chloride (KLOR-CON M) 20 MEQ extended release tablet Take 20 mEq by mouth 2 times daily      spironolactone (ALDACTONE) 25 MG tablet       warfarin (COUMADIN) 1 MG tablet Take 1 mg by mouth Twice a Week Indications: Mon/Fri Per [facility]      warfarin (COUMADIN) 1 MG tablet Take 2 mg by mouth Five times weekly Indications: Sun, Tues, Wed, Thurs, Sat Per [facility]   2 tablets      furosemide (LASIX) 40 MG tablet Take 40 mg by mouth 2 times daily      sotalol (BETAPACE) 80 MG tablet Take 80 mg by mouth 2 times daily Indications: restarted at higher dose on 1/23 by cardio when pt could not tolerate amiodarone      metoprolol succinate (TOPROL XL) 25 MG extended release tablet TAKE 1 TABLET DAILY 90 tablet 4    glimepiride (AMARYL) 2 MG tablet TAKE 1 TABLET EVERY MORNING BEFORE BREAKFAST 90 tablet 4    nitroGLYCERIN (NITROSTAT) 0.4 MG SL tablet Place 0.4 mg under the tongue      docusate sodium (COLACE) 100 MG capsule Take 100 mg by mouth every morning       aspirin 81 MG EC tablet Take 81 mg by mouth every morning       cefUROXime (CEFTIN) 250 MG tablet Take 250 mg by mouth 2 times daily      colchicine (COLCRYS) 0.6 MG tablet Take 0.6 mg by mouth as needed         Data         /79   Pulse 70   Temp 97.2 °F (36.2 °C) (Axillary)   Resp 16   Ht 5' 3\" (1.6 m)   Wt 160 lb 0.9 oz (72.6 kg)   SpO2 91%   BMI 28.35 kg/m²     Wt Readings from Last 3 Encounters:   04/02/20 160 lb 0.9 oz (72.6 kg)   03/21/20 166 lb 0.1 oz (75.3 kg)   03/11/20 159 lb (72.1 kg)        Code Status: Full Code     ADVANCED CARE PLANNING:  Patient has capacity for medical decisions: yes  Health Care Power of : no  Living Will: no     Personal, Social, and Family History  Marital Status:   Living situation:with family:  Spouse son lives next door to patient  Importance of shai/Roman Catholic/spiritual beliefs: [] Very reduced; LOCfull/confusion  ___20%  Bed Bound; Extensive disease; Total care; intake minimal; Drowsy/coma  ___10%  Bed Bound; Extensive disease; Total care; Mouth care only; Drowsy/coma  ___0       Death      Plan      Palliative Interaction:    Education/support to patient  Communications with primary service  Providing support for coping/adaptation/distress of patient  Discussing meaning/purpose   Code status clarified: Full Code  Principle Problem/Diagnosis:  CHF (congestive heart failure), NYHA class IV, acute on chronic, combined ([facility]    Additional Assessments:   Acute on chronic CHF  Prolonged QRS  Chronic atrial fib on coumadin  AICD/ pacemaker  Type 2 DM  hypothyroidism    1- Symptom management/ pain control     Pain Assessment:  The patient is not having any pain. Anxiety:  shortness of breath, Patient states anxiety increases shortness of breath                          Dyspnea:  chronic dyspnea                          Fatigue:  generalized weakness     Other:        2- Goals of care evaluation   The patient goals of care are improve or maintain function/quality of life and provide comfort care/support/palliation/relieve suffering   Goals of care discussed with:    [x] Patient independently    [] Patient and Family    [] Family or [facility] [address] independently    [] Unable to discuss with patient, family/DPOA not present    Laila Hernandez is a [de-identified]year old female that was seen today for a palliative care consult for goals of care and code status discussion. She states she has been having difficulties at home due to her SOB, weakness, anxiety. She states she lives at home with her  and her son lives next door and helps to take care of her. She has [place] at [place] and a hospital bed. Patient is a full code presently. Introduced myself to patient and explained my palliative care role to her. Her respirations relaxed on [place] oxygen and she was resting in bed.      DPOA/Living Palliative Care to participate in the care of Ms. Elizabeth Tan . Sapphire Norman The total time I spent in seeing the patient, discussing goals of care, advanced directives, code status and other major issues was more than 60 minutes      Electronically signed by   SYBIL James NP  Palliative Care Team  on 4/2/2020 at 3:49 PM    Palliative Care can be reached via perfect serve.

## 2020-04-02 NOTE — PROGRESS NOTES
Physical Therapy  DATE: 2020    NAME: Garland Wong  MRN: [id number]   : 1940    Patient not seen this date for Physical Therapy due to:  [] Blood transfusion in progress  [] Hemodialysis  []  Patient Declined  [] Spine Precautions   [] Strict Bedrest  [] Surgery/ Procedure  [] Testing      [x] Other Palliative Nurse with pt at this time. Will foloow up tomorrow. [] PT being discontinued at this time. Patient independent. No further needs. [] PT being discontinued at this time as the patient has been transferred to palliative care. No further needs.     Sophia Asp, PT

## 2020-04-02 NOTE — PROGRESS NOTES
[address] [facility]    OCCUPATIONAL THERAPY MISSED TREATMENT NOTE   INPATIENT   Date: 20  Patient Name: Lucian Rojas       Room: 33/2132-13  MRN: [id number]   Account #: [de-identified]    : 1940  ([de-identified] y.o.)  Gender: female                 REASON FOR MISSED TREATMENT:  Patient unable to participate   -   Other - [phone] pt with palliative care, will check back tomorrow for OT Ryan Araya OT This refill request was sent to the wrong med group. I believe this is your pt. Thank you

## 2020-04-02 NOTE — CONSULTS
[facility] PHYSICIANS CARDIOLOGY CONSULT NOTE      Date of Admission:  4/2/2020    Date of Consultation:  4/2/2020    PCP:  Kavon Suazo MD      REASON FOR CONSULT: Heart failure. HISTORY OF PRESENT ILLNESS:  Nidia Ozuna is a [de-identified] y.o. female with history of ASCVD including 48 to 60% right coronary artery disease on cardiac catheterization 3056, chronic systolic heart failure with reduced LV ejection fraction, dilated cardiomyopathy with severe left ventricular systolic dysfunction, LVEF 25 to 30%, moderate mitral regurgitation, trace tricuspid regurgitation, moderate pulmonary hypertension, mild RV systolic dysfunction on 2D echocardiogram December 2018 who presents with worsening shortness of breath and inability to sleep for the last 2 days, increased tiredness, and little leg swelling and fatigue. Denies any cough or fever or chills or AICD shocks are syncope or bleeding. Please refer to most recent office visit note in the E HR along with admitting history and physical for further details. Since admission started on IV Lasix. PMH:   has a past medical history of Atrial fibrillation ([address] Breast cancer, left breast ([address] CAD (coronary artery disease), CHF (congestive heart failure) ([address] Diabetes ([address] Diabetes ([address] Female bladder prolapse, GERD (gastroesophageal reflux disease), HLD (hyperlipidemia), Hypertension, Hypothyroid, Hypothyroidism, Idiopathic cardiomyopathy ([address] Kidney disease, Low back pain, Personal history of breast cancer, Type 2 diabetes mellitus with neurologic complication ([address] and Vision abnormalities. PSH:   has a past surgical history that includes Breast biopsy (Left, 12/2/13); Breast lumpectomy; Cataract removal with implant (Bilateral); Atrial ablation surgery; Cardiac defibrillator placement; Coronary angioplasty; and Appendectomy. Allergies:     Allergies   Allergen Reactions    Atorvastatin Other (See Comments)     Muscle cramps      Ciprofloxacin Itching    Other     Penicillins Diarrhea    Simvastatin Other (See Comments)     Muscle cramps      Statins     Sulfa Antibiotics         Home Meds:    Prior to Admission medications    Medication Sig Start Date End Date Taking? Authorizing Provider   levothyroxine (SYNTHROID) 50 MCG tablet TAKE 1 TABLET DAILY 4/1/20  Yes Zulema Art MD   midodrine (PROAMATINE) 5 MG tablet Take 1 tablet by mouth 3 times daily as needed (for SBP < 110) 3/31/20  Yes Zulema Art MD   LORazepam (ATIVAN) 0.5 MG tablet Take 1 tablet by mouth nightly for 30 days.  3/27/20 4/26/20 Yes Zulema Art MD   potassium chloride (KLOR-CON M) 20 MEQ extended release tablet Take 20 mEq by mouth 2 times daily 2/19/20  Yes Historical Provider, MD   spironolactone (ALDACTONE) 25 MG tablet  3/9/20  Yes Historical Provider, MD   warfarin (COUMADIN) 1 MG tablet Take 1 mg by mouth Twice a Week Indications: Mon/Fri Per [facility]   Yes Historical Provider, MD   warfarin (COUMADIN) 1 MG tablet Take 2 mg by mouth Five times weekly Indications: Sun, Tues, Wed, Thurs, Sat Per [facility]   2 tablets   Yes Historical Provider, MD   furosemide (LASIX) 40 MG tablet Take 40 mg by mouth 2 times daily   Yes Historical Provider, MD   sotalol (BETAPACE) 80 MG tablet Take 80 mg by mouth 2 times daily Indications: restarted at higher dose on 1/23 by cardio when pt could not tolerate amiodarone   Yes Historical Provider, MD   metoprolol succinate (TOPROL XL) 25 MG extended release tablet TAKE 1 TABLET DAILY 10/9/19  Yes Zulema Art MD   glimepiride (AMARYL) 2 MG tablet TAKE 1 TABLET EVERY MORNING BEFORE BREAKFAST 10/8/19  Yes Zulema Art MD   nitroGLYCERIN (NITROSTAT) 0.4 MG SL tablet Place 0.4 mg under the tongue 6/1/18  Yes Historical Provider, MD   docusate sodium (COLACE) 100 MG capsule Take 100 mg by mouth every morning    Yes Historical Provider, MD   aspirin 81 MG EC tablet Take 81 oral gel 15 g  15 g Oral PRN Becky Cunningham MD        dextrose 50 % IV solution  12.5 g Intravenous PRN Becky Cunningham MD        glucagon (rDNA) injection 1 mg  1 mg Intramuscular PRN Becky Cunningham MD        dextrose 5 % solution  100 mL/hr Intravenous PRN Becky Cunningham MD        furosemide (LASIX) injection 40 mg  40 mg Intravenous BID Becky Cunningham MD        warfarin (COUMADIN) tablet 2 mg  2 mg Oral Once Becky Cunningham MD        warfarin (COUMADIN) daily dosing (placeholder)   Other RX Vinnie Queen MD        hydrOXYzine (ATARAX) tablet 50 mg  50 mg Oral TID PRN Becky Cunningham MD        potassium chloride (KLOR-CON M) extended release tablet 20 mEq  20 mEq Oral BID  Ana Lynn MD           Social History:    Social History     Socioeconomic History    Marital status:      Spouse name: None    Number of children: None    Years of education: None    Highest education level: None   Occupational History    None   Social Needs    Financial resource strain: Not hard at all   [place]ac[place]vira insecurity     Worry: Never true     Inability: Never true    Transportation needs     Medical: No     Non-medical: No   Tobacco Use    Smoking status: Former Smoker     Packs/day: 0.50     Years: 10.00     Pack years: 5.00     Last attempt to quit: 1998     Years since quittin.5    Smokeless tobacco: Never Used   Substance and Sexual Activity    Alcohol use: No     Alcohol/week: 0.0 standard drinks     Frequency: Never    Drug use: No    Sexual activity: Never   Lifestyle    Physical activity     Days per week: None     Minutes per session: None    Stress: None   Relationships    Social connections     Talks on phone: None     Gets together: None     Attends Church service: None     Active member of club or organization: None     Attends meetings of clubs or organizations: None     Relationship status: Extremities: no cyanosis or clubbing or any significant worsening leg edema. No calf tenderness. Pulses:  Bilaterally symmetrical and intact radial pulses. Neurologic:  Able to move all 4 extremities. No gross focal deficits. Skin:  No rashes. Warm and dry. EKG:     Underlying atrial flutter with pacing. Abnormal EKG.     CARDIAC CATHETERIZATION:      Labs:      CBC:   Recent Labs     04/02/20  0740   WBC 7.2   HGB 12.7   HCT 39.0   .0*        BMP:   Recent Labs     04/02/20  0740   *   K 3.7   CL 95*   CO2 23   BUN 20   CREATININE 1.09*     PT/INR:   Recent Labs     04/02/20  0740   PROTIME 28.4*   INR 2.7       Recent Results (from the past 24 hour(s))   EKG 12 Lead    Collection Time: 04/02/20  7:01 AM   Result Value Ref Range    Ventricular Rate 72 BPM    Atrial Rate 300 BPM    QRS Duration 156 ms    Q-T Interval 548 ms    QTc Calculation (Bazett) 600 ms    R Axis -72 degrees    T Axis 119 degrees   CBC Auto Differential    Collection Time: 04/02/20  7:40 AM   Result Value Ref Range    WBC 7.2 3.5 - 11.0 k/uL    RBC 3.86 (L) 4.0 - 5.2 m/uL    Hemoglobin 12.7 12.0 - 16.0 g/dL    Hematocrit 39.0 36 - 46 %    .0 (H) 80 - 100 fL    MCH 32.9 26 - 34 pg    MCHC 32.5 31 - 37 g/dL    RDW 14.8 11.5 - 14.9 %    Platelets 373 570 - 895 k/uL    MPV 7.7 6.0 - 12.0 fL    NRBC Automated NOT REPORTED per 100 WBC    Differential Type NOT REPORTED     Seg Neutrophils 73 (H) 36 - 66 %    Lymphocytes 17 (L) 24 - 44 %    Monocytes 8 (H) 1 - 7 %    Eosinophils % 1 0 - 4 %    Basophils 1 0 - 2 %    Immature Granulocytes NOT REPORTED 0 %    Segs Absolute 5.30 1.3 - 9.1 k/uL    Absolute Lymph # 1.20 1.0 - 4.8 k/uL    Absolute Mono # 0.50 0.1 - 1.3 k/uL    Absolute Eos # 0.10 0.0 - 0.4 k/uL    Basophils Absolute 0.10 0.0 - 0.2 k/uL    Absolute Immature Granulocyte NOT REPORTED 0.00 - 0.30 k/uL    WBC Morphology NOT REPORTED     RBC Morphology NOT REPORTED     Platelet Estimate NOT REPORTED Result Value Ref Range    Troponin, High Sensitivity 12 0 - 14 ng/L    Troponin T NOT REPORTED <0.03 ng/mL    Troponin Interp NOT REPORTED    TSH w/reflex to FT4    Collection Time: 04/02/20 12:02 PM   Result Value Ref Range    TSH 7.70 (H) 0.30 - 5.00 mIU/L   T4, Free    Collection Time: 04/02/20 12:02 PM   Result Value Ref Range    Thyroxine, Free 1.56 0.93 - 1.70 ng/dL   POC Glucose Fingerstick    Collection Time: 04/02/20 12:15 PM   Result Value Ref Range    POC Glucose 155 (H) 65 - 105 mg/dL         IMPRESSION:    Acute on chronic systolic heart failure with reduced LV ejection fraction 25 to 30%. NYHA class IV. Short of breath at rest.    Idiopathic, dilated cardiomyopathy with severe left ventricular systolic dysfunction with LVEF 25 to 30%. Cardiac arrhythmias including nonsustained ventricular tachycardia, atrial fibrillation/flutter-on chronic warfarin therapy with target INR of 2-3 range. Patient used to be on sotalol and amiodarone in the past.    [facility] AICD- no shocks. Shortness of breath-multifactorial in etiology. Essential hypertension. History of chronic kidney disease stage III. Not on ACE inhibitor or ARB. Diabetes mellitus. Advanced age. Other problems as charted. REC/PLAN:    As ordered. IV Lasix, potassium supplement, Aldactone, Toprol-XL, warfarin, oxygen, other supportive care as you are doing. Patient voiced to me that she would like to be kept comfortable and does not want any CPR or shocks or intubation. Also requesting palliative care which was ordered by the PCPs group. We will discontinue telemetric monitoring. Discussed with the charge nurse who was present in the room at the time of my evaluation and she is going to notify the PCP regarding changing CODE STATUS per patient's wishes and consider hospice care, per patient's wishes.     Overall very poor prognosis and increased risk for recurrent hospitalizations with heart failure exacerbation etc.    There are no family members available at bedside to discuss. We will follow. Thank you once again for your kind consultation. Electronically signed by Marcos Pro MD, [facility]      PLEASE NOTE:  This progress note was completed using a voice transcription system. Every effort was made to ensure accuracy. However, inadvertent computerized transcription errors may be present.

## 2020-04-02 NOTE — DISCHARGE INSTR - COC
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.39) E28.39    Female cystocele 2-3 N81.10    Pessary maintenance Z46.89    Breast cancer, left ([facility] C50.912    Personal history of breast cancer Z85.3    GERD (gastroesophageal reflux disease) O80.2    Systolic CHF ([facility] N25.41    CKD (chronic kidney disease) stage 3, GFR 30-59 ml/min ([facility]) N18.3    Idiopathic cardiomyopathy ([facility]) I42.8    HLD (hyperlipidemia) E78.5    Coronary artery disease involving native coronary artery of native heart without angina pectoris I25.10    Essential hypertension I10    Type 2 diabetes mellitus without complication, without long-term current use of insulin ([facility]) E11.9    UTI (urinary tract infection) N39.0    Acute congestive heart failure ([facility]) I50.9    Supratherapeutic INR R79.1    Hypertensive heart disease I11.9    Pacemaker Z95.0    ICD (implantable cardioverter-defibrillator) in place Z95.810    Acquired hypothyroidism E03.9    Chronic atrial fibrillation I48.20    Low back pain M54.5    Syncope and collapse R55    Chronic gout of right ankle M1A.0710    VT (ventricular tachycardia) ([facility]) I47.2    ICD (implantable cardioverter-defibrillator) discharge Z45.02    Defibrillator discharge Z45.02    Acute cystitis without hematuria N30.00    Congestive heart failure, NYHA class IV, acute on chronic, systolic ([facility]) P52.12    FH: CHF (congestive heart failure) Z82.49    CHF (congestive heart failure), NYHA class IV, acute on chronic, combined ([facility]) I50.43    Prolonged Q-T interval on ECG R94.31       Isolation/Infection:   Isolation          No Isolation        Patient Infection Status     None to display          Nurse Assessment:  Last Vital Signs: /79   Pulse 70   Temp 97.2 °F (36.2 °C) (Axillary)   Resp 16   Ht 5' 3\" (1.6 m)   Wt 160 lb 0.9 oz (72.6 kg)   SpO2 91%   BMI 28.35 kg/m²     Last documented pain score (0-10 scale): Pain Level: 3  Last Weight:   Wt Readings from Last 1 Encounters:   04/02/20 160 lb 0.9 oz (72.6 kg)     Mental Status:  oriented and alert    IV Access:  - None    Nursing Mobility/ADLs:  Walking   Independent  Transfer  Independent  Bathing  Independent  Dressing  Independent  Toileting  Independent  Feeding  Independent  Med Admin  Independent  Med Delivery   whole    Wound Care Documentation and Therapy:        Elimination:  Continence:   · Bowel: Yes  · Bladder: Yes  Urinary Catheter: None   Colostomy/Ileostomy/Ileal Conduit: No       Date of Last BM: 4/4/20    Intake/Output Summary (Last 24 hours) at 4/2/2020 1908  Last data filed at 4/2/2020 1814  Gross per 24 hour   Intake --   Output 1525 ml   Net -1525 ml     I/O last 3 completed shifts:  In: -   Out: [address] [Urine:1225]    Safety Concerns: At Risk for Falls    Impairments/Disabilities:      Vision    Nutrition Therapy:  Current Nutrition Therapy:   - Oral Diet:  Cardiac    Routes of Feeding: Oral  Liquids: Thin Liquids  Daily Fluid Restriction: no  Last Modified Barium Swallow with Video (Video Swallowing Test): not done    Treatments at the Time of Hospital Discharge:   Respiratory Treatments: none  Oxygen Therapy:  is on oxygen at 2 L/min per nasal cannula. Ventilator:    - No ventilator support    Rehab Therapies: Physical Therapy and Occupational Therapy  Weight Bearing Status/Restrictions: No weight bearing restirctions  Other Medical Equipment (for information only, NOT a DME order):  cane, walker and hospital bed  Other Treatments:   Skilled nursing assessment and monitoring  Medication education  Home health care agency's  to evaluate patient two weeks prior to discharge from home health to determine post-discharge services.        Patient's personal belongings (please select all that are sent with patient):  Glasses, Dentures upper    RN SIGNATURE:  Electronically signed by David Yu RN on 4/4/20 at 2:45 PM EDT    CASE MANAGEMENT/SOCIAL WORK SECTION    Inpatient Status Date: 4/2/2020    Readmission Risk Assessment Score:  Readmission Risk              Risk of Unplanned Readmission:        22           Discharging to Facility/ Agency     Name: [facility]  [address] [place] [address]  Phone [phone]  Fax  [phone]    / signature: Electronically signed by Zuri Nath RN on 4/4/20 at 4:37 PM EDT    PHYSICIAN SECTION    Prognosis: Good    Condition at Discharge: Stable    Rehab Potential (if transferring to Rehab): Good    Recommended Labs or Other Treatments After Discharge: BMP on 4/7/2020 then weekly for 2 more weeks. Physician Certification: I certify the above information and transfer of Vicente Romero  is necessary for the continuing treatment of the diagnosis listed and that she requires Home Care for less 30 days.      Update Admission H&P: No change in H&P    PHYSICIAN SIGNATURE:  Electronically signed by Cris Vargas MD on 4/3/20 at 11:46 AM EDT

## 2020-04-02 NOTE — PLAN OF CARE
Problem: Breathing Pattern - Ineffective:  Goal: Ability to achieve and maintain a regular respiratory rate will improve  Description: Ability to achieve and maintain a regular respiratory rate will improve  Outcome: Ongoing  Note: Patient is on 2L NC oxygen saturation remains above 92%     Problem: Skin Integrity:  Goal: Skin integrity will stabilize  Description: Skin integrity will stabilize  Outcome: Ongoing  Note: No new alterations in skin integrity.

## 2020-04-02 NOTE — ED PROVIDER NOTES
components within normal limits   TROP/MYOGLOBIN - Abnormal; Notable for the following components:    Myoglobin 23 (*)     All other components within normal limits   PROCALCITONIN   TROP/MYOGLOBIN         EMERGENCY DEPARTMENT COURSE:   Vitals:    Vitals:    04/02/20 0700 04/02/20 0715 04/02/20 0730 04/02/20 0745   BP: 104/72 109/73 111/77 112/72   Pulse: 76 84 89 72   Resp: 17 22 25 24   Temp:       TempSrc:       SpO2: 97% 96% 94% 94%   Weight:       Height:           The patient was given the following medications while in the emergency department:     Orders Placed This Encounter   Medications    furosemide (LASIX) injection 40 mg       -------------------------  8:55 AM EDT  Patient has been reevaluated and still states that she feels like she is drowning. Patient has been updated in all the results and we will get her admitted. I spoke with Dr. Josias Ayers who agrees to the admission. No COVID precautions. Residents been notified. CRITICAL CARE:   None    CONSULTS:  IP CONSULT TO PRIMARY CARE PROVIDER    PROCEDURES:  None    FINAL IMPRESSION      1. Congestive heart failure, unspecified HF chronicity, unspecified heart failure type [facility])          DISPOSITION/PLAN   DISPOSITION Decision To Admit 04/02/2020 08:43:51 AM      PATIENT REFERRED TO:  No follow-up provider specified.     DISCHARGE MEDICATIONS:  New Prescriptions    No medications on file       (Please note that portions of this note were completed with a voice recognition program.  Efforts were made to edit the dictations but occasionally words are mis-transcribed.)    Shweta Reza MD  Attending Margarette Doty MD  04/02/20 5123

## 2020-04-02 NOTE — H&P
[address] [address]      [address]     HISTORY AND PHYSICAL EXAMINATION            Date:   4/2/2020  Patient name:  Marcelle Matthews  Date of admission:  4/2/2020  6:56 AM  MRN:   [id number]  Account:  [de-identified]  YOB: 1940  PCP:    Mart Bean MD  Room:   02/02  Code Status:    Prior    Chief Complaint:     Chief Complaint   Patient presents with    Shortness of Breath       History Obtained From:     patient    History of Present Illness: The patient is a [de-identified] y.o. Non-/non  female who presents withShortness of Breath   and she is admitted to the hospital for the management of  Acute on Chronic CHF exacerbation. Patient has a PMH of CHF, Type II diabetes, atrial fibrillation on coumadin,  Mitral regurgitation, s/p AICD and pacemaker placement. Presented with chief complaint of shortness of breath. She states that this problem has been ongoing for some time. She reports that it is worse when she walks to the kitchen or when she is lying flat. She states that she has not been able to sleep the past 2 nights and this is the main reason she came to the hospital. She denies any leg swelling, chest pain, cough, fever, chills, headache, abdominal pain, dysuria, nausea, vomiting, diarrhea. The patient did note that she feels she would be better of dead then how she feels now but denies active suicidal ideation/plan. She was recently discharged from the hospital for the same problem. At that time she was seen by her cardiologist, Dr. Jane Florentino, who was considering LIFESCAPE clip for severe mitral regurgitation. In addition, patient was not a candidate at the time for ACE/ARB/Entresto because of her hypotension. Today, in the ED patient had Atrial flutter w/ variable AV block.  In addition, CXR demonstrated interstitial edema and multifocal 25 MG tablet  3/9/20  Yes Historical Provider, MD   warfarin (COUMADIN) 1 MG tablet Take 1 mg by mouth Twice a Week Indications: Mon/Fri Per [facility]   Yes Historical Provider, MD   warfarin (COUMADIN) 1 MG tablet Take 2 mg by mouth Five times weekly Indications: Sun, Tues, Wed, Thurs, Sat Per [facility]   2 tablets   Yes Historical Provider, MD   furosemide (LASIX) 40 MG tablet Take 40 mg by mouth 2 times daily   Yes Historical Provider, MD   sotalol (BETAPACE) 80 MG tablet Take 80 mg by mouth 2 times daily Indications: restarted at higher dose on 1/23 by cardio when pt could not tolerate amiodarone   Yes Historical Provider, MD   metoprolol succinate (TOPROL XL) 25 MG extended release tablet TAKE 1 TABLET DAILY 10/9/19  Yes Marcel Randolph MD   glimepiride (AMARYL) 2 MG tablet TAKE 1 TABLET EVERY MORNING BEFORE BREAKFAST 10/8/19  Yes Marcel Randolph MD   nitroGLYCERIN (NITROSTAT) 0.4 MG SL tablet Place 0.4 mg under the tongue 6/1/18  Yes Historical Provider, MD   docusate sodium (COLACE) 100 MG capsule Take 100 mg by mouth every morning    Yes Historical Provider, MD   aspirin 81 MG EC tablet Take 81 mg by mouth every morning    Yes Historical Provider, MD   cefUROXime (CEFTIN) 250 MG tablet Take 250 mg by mouth 2 times daily 2/28/20   Historical Provider, MD   colchicine (COLCRYS) 0.6 MG tablet Take 0.6 mg by mouth as needed 12/17/19   Historical Provider, MD        Allergies:     Atorvastatin; Ciprofloxacin; Other; Penicillins; Simvastatin; Statins; and Sulfa antibiotics    Social History:     Tobacco:    reports that she quit smoking about 21 years ago. She has a 5.00 pack-year smoking history. She has never used smokeless tobacco.  Alcohol:      reports no history of alcohol use. Drug Use:  reports no history of drug use.     Family History:     Family History   Problem Relation Age of Onset    Heart Disease Father     Stroke Father     Hypertension Father    [place] Heart Failure Mother     Breast Cancer Neg Hx     Cancer Neg Hx     Colon Cancer Neg Hx     Diabetes Neg Hx     Eclampsia Neg Hx     Ovarian Cancer Neg Hx      Labor Neg Hx     Spont Abortions Neg Hx        Review of Systems:     Positive and Negative as described in HPI. Review of Systems   Constitutional: Positive for fatigue. Negative for chills and fever. HENT: Negative for congestion. Eyes: Negative. Respiratory: Positive for shortness of breath. Negative for cough, chest tightness and wheezing. Cardiovascular: Negative for chest pain, palpitations and leg swelling. Gastrointestinal: Negative for abdominal distention, abdominal pain, constipation and diarrhea. Endocrine: Negative. Genitourinary: Negative for difficulty urinating, dysuria, flank pain and frequency. Musculoskeletal: Negative for arthralgias and back pain. Skin: Negative. Negative for color change. Neurological: Negative. Negative for dizziness and headaches. Psychiatric/Behavioral: Positive for sleep disturbance (difficulty sleeping). The patient is nervous/anxious. Physical Exam:   /72   Pulse 72   Temp 98.1 °F (36.7 °C) (Oral)   Resp 24   Ht 5' 3\" (1.6 m)   Wt 176 lb (79.8 kg)   SpO2 94%   BMI 31.18 kg/m²   Temp (24hrs), Av.1 °F (36.7 °C), Min:98.1 °F (36.7 °C), Max:98.1 °F (36.7 °C)    No results for input(s): POCGLU in the last 72 hours. No intake or output data in the 24 hours ending 20 1055    Physical Exam  Constitutional:       General: She is not in acute distress. Appearance: Normal appearance. She is normal weight. HENT:      Head: Normocephalic and atraumatic. Mouth/Throat:      Mouth: Mucous membranes are moist.      Pharynx: Oropharynx is clear. Eyes:      Extraocular Movements: Extraocular movements intact. Pupils: Pupils are equal, round, and reactive to light. Neck:      Musculoskeletal: No neck rigidity.       Vascular: No carotid

## 2020-04-03 NOTE — PROGRESS NOTES
agreement that she would like to be mainly comfort care but she does remain full code at this time. Awaiting final discussions with the patient's son for further care decisions. Review of Systems:     Review of Systems   Constitutional: Positive for fatigue. Negative for chills and fever. HENT: Negative for congestion. Eyes: Negative. Respiratory: Negative for cough, chest tightness, shortness of breath and wheezing. Cardiovascular: Negative for chest pain, palpitations and leg swelling. Gastrointestinal: Negative for abdominal distention, abdominal pain, constipation and diarrhea. Endocrine: Negative. Genitourinary: Negative for difficulty urinating, dysuria, flank pain and frequency. Musculoskeletal: Negative for arthralgias and back pain. Skin: Negative. Negative for color change. Neurological: Negative. Negative for dizziness and headaches. Psychiatric/Behavioral: Negative for sleep disturbance. The patient is nervous/anxious. Medications: Allergies:     Allergies   Allergen Reactions    Atorvastatin Other (See Comments)     Muscle cramps      Ciprofloxacin Itching    Other     Penicillins Diarrhea    Simvastatin Other (See Comments)     Muscle cramps      Statins     Sulfa Antibiotics        Current Meds:   Scheduled Meds:    magnesium oxide  400 mg Oral Daily    metOLazone  2.5 mg Oral Daily    aspirin  81 mg Oral QAM    docusate sodium  100 mg Oral QAM    levothyroxine  50 mcg Oral Daily    LORazepam  0.5 mg Oral Nightly    metoprolol succinate  25 mg Oral Daily    spironolactone  25 mg Oral Daily    sodium chloride flush  10 mL Intravenous 2 times per day    furosemide  40 mg Intravenous BID    warfarin (COUMADIN) daily dosing (placeholder)   Other RX Placeholder    potassium chloride  20 mEq Oral BID WC     Continuous Infusions:    dextrose       PRN Meds: midodrine, sodium chloride flush, acetaminophen **OR** acetaminophen, polyethylene within upper lobes bilaterally and left lower lobe remain essentially unchanged. Peripheral Kerley B-lines are suggestive of interstitial edema. Implantable cardiac device leads are in unchanged positioning. Generalized osteopenia. No obvious pleural effusion or pneumothorax. Cardiomegaly and subtle interstitial edema. Multifocal airspace opacities primarily in the upper lungs remain unchanged. Physical Examination:        Physical Exam  Constitutional:       General: She is not in acute distress. Appearance: Normal appearance. She is normal weight. HENT:      Head: Normocephalic and atraumatic. Mouth/Throat:      Mouth: Mucous membranes are moist.      Pharynx: Oropharynx is clear. Eyes:      Extraocular Movements: Extraocular movements intact. Pupils: Pupils are equal, round, and reactive to light. Neck:      Musculoskeletal: No neck rigidity. Vascular: No carotid bruit. Cardiovascular:      Rate and Rhythm: Normal rate and regular rhythm. Pulses: Normal pulses. Pulmonary:      Effort: Pulmonary effort is normal. No respiratory distress. Breath sounds: Rhonchi present. Abdominal:      General: Abdomen is flat. Bowel sounds are normal. There is no distension. Tenderness: There is no abdominal tenderness. Musculoskeletal:         General: No swelling. Right lower leg: No edema. Left lower leg: No edema. Skin:     General: Skin is warm and dry. Capillary Refill: Capillary refill takes less than 2 seconds. Neurological:      General: No focal deficit present. Mental Status: She is alert. Psychiatric:         Mood and Affect: Mood is anxious.            Assessment:        Primary Problem  CHF (congestive heart failure), NYHA class IV, acute on chronic, combined [facility])    Active Hospital Problems    Diagnosis Date Noted    CHF (congestive heart failure), NYHA class IV, acute on chronic, combined ([facility] [I50.43] 04/02/2020   

## 2020-04-03 NOTE — PROGRESS NOTES
Writer spoke with Andrew Santiago CNP regarding patients 16 beat run of V-tach. She states Dr. Natalie Anderson will see the patient tomorrow.

## 2020-04-03 NOTE — PROGRESS NOTES
Pacemaker    ICD (implantable cardioverter-defibrillator) in place    Acquired hypothyroidism    Chronic atrial fibrillation    Low back pain    Syncope and collapse    Chronic gout of right ankle    [facility]ricular tachycardia) ([facility])    ICD (implantable cardioverter-defibrillator) discharge    Defibrillator discharge    Acute cystitis without hematuria    Congestive heart failure, NYHA class IV, acute on chronic, systolic (HCC)    FH: CHF (congestive heart failure)    CHF (congestive heart failure), NYHA class IV, acute on chronic, combined (HCC)    Prolonged Q-T interval on ECG       Palliative Interaction: I reach out to the patient's son Bre Jackson at [phone] and I do reach him. We discuss his Mom and the struggles and difficulties that she had been having at home. Son Bre Jackson states\" she is so short of breath and weak and I have a fear that she is going to fall. \" Bre Jackson expresses\" she fears the night and is up all night , and I'm getting no sleep and then have to take care of my Dad during the day. \" Bre Jackson states\" I\"m getting no sleep and I am beside myself, and I think that she needs to go to a nursing home or something for a while. \"   GOALS OF CARE:   Bre Jackson and I also talk about her full code status and what that all entails, and that a DNRCC-A may be more appropriate according to her wishes. Bre Jackson says \" I agree and I just want to do what she wants. \"  I stated that I would talk with her , and that I would also talk about placement for some rehab and her wishes regarding her code status. Bre Jackson was very appreciative and that I would get a SW to help with placement. CODE STATUS DISCUSSION:  I talk with the patient regarding her full code status classification , and all that entails CPR, shocking and intubation. I tell her that it is not her son's choice for her , and that it is her choice. I tell her that her son Foster's job is to honor her wishes if she cannot speak for herself.  She agrees that a DNRCC-A and

## 2020-04-03 NOTE — PROGRESS NOTES
[facility] CARDIOLOGY Progress Note    4/3/2020 8:34 AM      Subjective:  Ms. Tona Cunha is breathing better and slept better. Less anxious this morning. Diuresing well. Complains of some leg restlessness. Patient denies any chest pain or palpitations or lightheadedness or dizziness. Review of systems:  No fever or chills. No cough. No diarrhea. No headaches. Reviewed prior entries including palliative care consult.              LABS:     Recent Results (from the past 24 hour(s))   TROP/MYOGLOBIN    Collection Time: 04/02/20 11:00 AM   Result Value Ref Range    Troponin, High Sensitivity 15 (H) 0 - 14 ng/L    Troponin T NOT REPORTED <0.03 ng/mL    Troponin Interp NOT REPORTED     Myoglobin 21 (L) 25 - 58 ng/mL   Brain Natriuretic Peptide    Collection Time: 04/02/20 12:02 PM   Result Value Ref Range    Pro-BNP 14,705 (H) <300 pg/mL    BNP Interpretation Pro-BNP Reference Range:    Troponin    Collection Time: 04/02/20 12:02 PM   Result Value Ref Range    Troponin, High Sensitivity 12 0 - 14 ng/L    Troponin T NOT REPORTED <0.03 ng/mL    Troponin Interp NOT REPORTED    TSH w/reflex to FT4    Collection Time: 04/02/20 12:02 PM   Result Value Ref Range    TSH 7.70 (H) 0.30 - 5.00 mIU/L   T4, Free    Collection Time: 04/02/20 12:02 PM   Result Value Ref Range    Thyroxine, Free 1.56 0.93 - 1.70 ng/dL   POC Glucose Fingerstick    Collection Time: 04/02/20 12:15 PM   Result Value Ref Range    POC Glucose 155 (H) 65 - 105 mg/dL   POC Glucose Fingerstick    Collection Time: 04/02/20  4:25 PM   Result Value Ref Range    POC Glucose 196 (H) 65 - 105 mg/dL   POC Glucose Fingerstick    Collection Time: 04/02/20  8:22 PM   Result Value Ref Range    POC Glucose 209 (H) 65 - 105 mg/dL   Comprehensive Metabolic Panel w/ Reflex to MG    Collection Time: 04/03/20  5:29 AM   Result Value Ref Range    Glucose 100 (H) 70 - 99 mg/dL    BUN 18 8 - 23 mg/dL    CREATININE 1.12 (H) 0.50 - 0.90 mg/dL    Bun/Cre Ratio spironolactone  25 mg Oral Daily    sodium chloride flush  10 mL Intravenous 2 times per day    furosemide  40 mg Intravenous BID    warfarin (COUMADIN) daily dosing (placeholder)   Other RX Placeholder    potassium chloride  20 mEq Oral BID WC              VITAL SIGNS:    /68   Pulse 76   Temp 97.4 °F (36.3 °C) (Oral)   Resp 16   Ht 5' 3\" (1.6 m)   Wt 160 lb 0.9 oz (72.6 kg)   SpO2 100%   BMI 28.35 kg/m²  2 L/min      Admit Weight:  176 lb (79.8 kg)    Last 3 weights: Wt Readings from Last 3 Encounters:   04/02/20 160 lb 0.9 oz (72.6 kg)   03/21/20 166 lb 0.1 oz (75.3 kg)   03/11/20 159 lb (72.1 kg)       BMI: Body mass index is 28.35 kg/m². INPUT/OUTPUT:          Intake/Output Summary (Last 24 hours) at 4/3/2020 0834  Last data filed at 4/3/2020 0543  Gross per 24 hour   Intake --   Output 2025 ml   Net -2025 ml         Telemetry shows V paced rhythm. 16 beat nonsustained ventricular tachycardia upon review of rhythm strips-asymptomatic. No ICD shocks. EXAM:     General appearance: awake, alert. Pleasant. O2 per nasal cannula in place which is making her breathing better. Neck: No JVD. Chest: Bibasilar crackles posteriorly. No tenderness. No wheezing. Cardiac: Regular rate and rhythm. +S3 gallop. Abdomen: soft, non-tender. Extremities: no cyanosis, no clubbing, no calf tenderness, no leg edema. Pulses: intact bilateral radial pulses. Skin:  warm and dry. Neuro:  Able to move all 4 extremities. ASSESSMENT:    Nonsustained ventricular tachycardia-16 beat-asymptomatic. No ICD shocks. Acute on chronic systolic heart failure with reduced LV ejection fraction 25 to 30%. NYHA class IV. Improving gradually with good diuresis since admission.       Idiopathic, dilated cardiomyopathy with severe left ventricular systolic dysfunction with LVEF 25 to 30%.      Cardiac arrhythmias including nonsustained ventricular tachycardia, atrial fibrillation/flutter-on chronic warfarin therapy with target INR of 2-3 range. Patient used to be on sotalol and amiodarone in the past.     [facility] AICD- no shocks.     Shortness of breath-multifactorial in etiology. Wearing oxygen 24/7 per nasal cannula is helping her shortness of breath.     Essential hypertension.     History of chronic kidney disease stage III. Not on ACE inhibitor or ARB.     Diabetes mellitus.     Advanced age.     Other problems as charted. REC/PLAN:    Improving gradually. We will add Zaroxolyn 2.5 mg daily x3 days, check magnesium level and add magnesium oxide 400 mg daily. Continue on IV Lasix and other current treatment as ordered. Patient has not decided yet regarding her CODE STATUS or palliative care or hospice care. Suggest at least switching her over to [address], per patient's wishes of comfort care but will defer it up to PCP service. No family members available at bedside to discuss. Discussed with the charge nurse at bedside. We will follow. Electronically signed by Mily Arboleda MD, [facility]    2 D ECHO Jan 9, 2020:    Left ventricle is enlarged. Global left ventricular systolic function is  Moderately reduced. Estimated ejection fraction is 30 % . Normal left ventricular wall thickness. Grade II (moderate) left ventricular diastolic dysfunction. Left atrium is moderately dilated. Right atrial dilatation. Pacemaker / ICD lead seen in right atrium/right ventricle. Mild to moderate aortic insufficiency. Moderate to severe mitral regurgitation, with pulm vein showing systolic  blunting. Moderate tricuspid regurgitation. Moderate pulmonary hypertension with an estimated right ventricular systolic  pressure of 54 mmHg. Mild pulmonic insufficiency. PLEASE NOTE:  This progress note was completed using a voice transcription system. Every effort was made to ensure accuracy. However, inadvertent computerized transcription errors may be present.

## 2020-04-03 NOTE — PROGRESS NOTES
Code status discussion:    I had a long discussion with the patient's family members, in presence of the RN(s) and  taking care of the patient. Patient's current condition laboratory and radiographic findings as well as recommendations of physicians consulted on the case were discussed with the patient's family in detail in simple [place]. All questions and concerns of the family were addressed, and appropriate emotional support was provided. After understanding the patient's current medical condition, family decided that they wanted the patient's code status be changed to DNR-CCA-No intubation. I honored the family's wishes, and changed the patient's code status to DNR-CCA-no intubation. Emotional support given.     Electronically signed by Zehra Toro MD on 4/3/2020 at 3:28 PM

## 2020-04-04 NOTE — DISCHARGE SUMMARY
[address]    Discharge Summary     Patient ID: Nidia Ozuna  :  1940   MRN: [id number]     ACCOUNT:  [de-identified]   Patient's PCP: Kavon Suazo MD  Admit Date: 2020   Discharge Date: 2020   Length of Stay: 2  Code Status:  DNR-CCA  Admitting Physician: Kay Wong MD  Discharge Physician: Anny Barron MD     Active Discharge Diagnoses:       Primary Problem  CHF (congestive heart failure), NYHA class IV, acute on chronic, combined [facility] Problems    Diagnosis Date Noted    CHF (congestive heart failure), NYHA class IV, acute on chronic, combined ([address] [I50.43] 2020    Prolonged Q-T interval on ECG [R94.31] 2020    Chronic atrial fibrillation [I48.20] 2017    Acquired hypothyroidism [E03.9] 10/25/2016    Pacemaker [Z95.0] 2016    ICD (implantable cardioverter-defibrillator) in place [Z95.810] 2016    Type 2 diabetes mellitus without complication, without long-term current use of insulin ([address] [E11.9] 2015    HLD (hyperlipidemia) [E78.5]     Coronary artery disease involving native coronary artery of native heart without angina pectoris [I25.10]     CKD (chronic kidney disease) stage 3, GFR 30-59 ml/min ([address] [N18.3] 2014       Admission Condition:  poor     Discharged Condition: fair    Hospital Stay:       Hospital Course:  Nidia Ozuna is a [de-identified] y.o. female who was admitted for the management of   CHF (congestive heart failure), NYHA class IV, acute on chronic, combined ([address] , presented to ER with Shortness of Breath    This is a patient with past medical history of CHF, type 2 diabetes, atrial fibrillation on Coumadin, mitral regurgitation, status post AICD placement. She presented to the ED on  with chief complaint of shortness of breath.   This is an acute on chronic problem and she was recently discharged for similar presentation due to acute on chronic combined CHF. Chest x-ray on admission showed cardiomegaly, interstitial edema, multifocal airspace opacities in the upper lobes. Echo 1/2020 showed LVEF of 30%, grade 2 LV diastolic dysfunction, severe mitral regurgitation. On admission patient was treated with IV Lasix 40 mg twice daily and her home medications were continued. Cardiology was consulted and they also added metolazone 2.5 daily. Patient's metoprolol dosage was also increased. Sotalol was held due to increased QTc interval.    Of note during this admission patient's CODE STATUS was switched from full code to DNR CCA without intubations. Today 4/4 patient is stable and clinically improved. She is no longer complaining of shortness of breath after diuresis. She is on nasal cannula oxygen 2 L and will be discharged with instructions to remain on nasal cannula at baseline. We will discharge at this time with instructions per cardiology. Significant therapeutic interventions: none    Significant Diagnostic Studies:   Labs / Micro:  CBC:   Lab Results   Component Value Date    WBC 5.7 04/04/2020    RBC 3.75 04/04/2020    RBC 3.53 02/17/2012    HGB 12.4 04/04/2020    HCT 37.5 04/04/2020    .0 04/04/2020    MCH 33.1 04/04/2020    MCHC 33.1 04/04/2020    RDW 14.7 04/04/2020     04/04/2020     02/17/2012     BMP:    Lab Results   Component Value Date    GLUCOSE 110 04/04/2020    GLUCOSE 122 02/17/2012     04/04/2020    K 3.9 04/04/2020    CL 94 04/04/2020    CO2 28 04/04/2020    ANIONGAP 13 04/04/2020    BUN 24 04/04/2020    CREATININE 1.22 04/04/2020    BUNCRER NOT REPORTED 04/04/2020    CALCIUM 9.0 04/04/2020    LABGLOM 42 04/04/2020    GFRAA 51 04/04/2020    GFR      04/04/2020    GFR NOT REPORTED 04/04/2020       Radiology:    Xr Chest Standard (2 Vw)    Result Date: 4/4/2020  EXAMINATION: TWO XRAY VIEWS OF THE CHEST 4/4/2020 10:11 am COMPARISON: Chest April 2, 2020.  HISTORY: ORDERING SYSTEM PROVIDED HISTORY: SOB, Heart failure TECHNOLOGIST PROVIDED HISTORY: SOB, Heart failure Reason for Exam: Pt states CHF Acuity: Unknown Type of Exam: Unknown FINDINGS: Defibrillator device in place. Heart and mediastinal structures appear unchanged. The patient's previously described multifocal airspace disease within the upper lobes persists. This appears to be superimposed on pulmonary edema. No pneumothorax, pleural effusion or free air. Overall, is been no significant change in chest findings compared to the April 2nd study. Xr Chest Standard (2 Vw)    Result Date: 3/18/2020  EXAMINATION: TWO XRAY VIEWS OF THE CHEST 3/18/2020 9:16 am COMPARISON: 03/17/2020 HISTORY: ORDERING SYSTEM PROVIDED HISTORY: follow up imaging TECHNOLOGIST PROVIDED HISTORY: follow up imaging Reason for Exam: sob, follow up imaging Acuity: Unknown Type of Exam: Subsequent/Follow-up FINDINGS: Cardiac silhouette is borderline enlarged. Dense atherosclerotic calcifications of the aortic arch. Implantable cardiac device leads overlying the right atrium and ventricle. Multifocal mixed interstitial airspace opacities are identified primarily within the upper lobes bilaterally. Small left pleural effusion adjacent atelectasis/airspace disease. Osseous structures and soft tissues are grossly intact. Multifocal mixed interstitial and airspace opacities within the upper lobes bilaterally, essentially unchanged as compared to previous examination. Xr Chest Standard (2 Vw)    Result Date: 3/17/2020  EXAMINATION: TWO XRAY VIEWS OF THE CHEST 3/17/2020 5:25 pm COMPARISON: 1/3/2020 HISTORY: ORDERING SYSTEM PROVIDED HISTORY: sob TECHNOLOGIST PROVIDED HISTORY: sob Reason for Exam: Short of breath Acuity: Chronic Type of Exam: Ongoing Additional signs and symptoms: Short of breath FINDINGS: Left AICD. Chronic interstitial lung changes. Increased right upper lobe opacity. Increased perihilar opacities. Cardiomegaly. Increased right upper lobe perihilar opacities may be related to pulmonary edema versus an atypical infection. Xr Chest Portable    Result Date: 4/2/2020  EXAMINATION: ONE XRAY VIEW OF THE CHEST 4/2/2020 7:20 am COMPARISON: 03/18/2020 HISTORY: ORDERING SYSTEM PROVIDED HISTORY: SOB TECHNOLOGIST PROVIDED HISTORY: SOB Reason for Exam: SOB h/o COPD Acuity: Acute Type of Exam: Initial Additional signs and symptoms: SOB h/o COPD FINDINGS: Cardiomegaly. Multifocal airspace opacities within upper lobes bilaterally and left lower lobe remain essentially unchanged. Peripheral Kerley B-lines are suggestive of interstitial edema. Implantable cardiac device leads are in unchanged positioning. Generalized osteopenia. No obvious pleural effusion or pneumothorax. Cardiomegaly and subtle interstitial edema. Multifocal airspace opacities primarily in the upper lungs remain unchanged. Consultations:    Consults:     Final Specialist Recommendations/Findings:   IP CONSULT TO PRIMARY CARE PROVIDER  IP CONSULT TO HEART FAILURE NURSE/COORDINATOR  IP CONSULT TO SOCIAL WORK  PHARMACY TO DOSE WARFARIN  IP CONSULT TO CARDIOLOGY  IP CONSULT TO PALLIATIVE CARE  IP CONSULT TO HOSPICE      The patient was seen and examined on day of discharge and this discharge summary is in conjunction with any daily progress note from day of discharge. Discharge plan:       Disposition: Home    Physician Follow Up:     Ishmael Perez MD  [address] [place] [address], [address] [place] [address]  [phone]    Schedule an appointment as soon as possible for a visit in 2 weeks  Tele-health appointment       Requiring Further Evaluation/Follow Up POST HOSPITALIZATION/Incidental Findings:  Wide QTc interval    Diet: renal diet    Activity: As tolerated    Instructions to Patient:   - Take all medications as prescribed  - Follow up with PCP   - In case of any

## 2020-04-04 NOTE — PROGRESS NOTES
[facility]    PROGRESS NOTE             4/4/2020    8:28 AM    Name:   Skinny Reid  MRN:     [id number]     Acct:      [de-identified]   Room:   2101/2101-01   Day:  2  Admit Date:  4/2/2020  6:56 AM    PCP:  Anh Person MD  Code Status:  DNR-CCA    Subjective:     C/C:   Chief Complaint   Patient presents with    Shortness of Breath     Interval History Status: not changed. Patient seen and examined at bedside this morning. Hemodynamically stable, no acute events overnight. She is resting comfortably in bed at time of examination. Patient denies shortness of breath today. She is on nasal cannula oxygen. She otherwise denies new symptoms overnight including chest pain, nausea, abdominal pain, fever, chills. Yesterday patient was switched to DNR CCA with no intubations. Plan is for her to be discharged to [facility] later today. Cardiology recommendations for discharge medications are appreciated. Brief History:     See H&P    Review of Systems:     Review of Systems   Constitutional: Negative for chills and fever. HENT: Negative for congestion, ear pain and sore throat. Respiratory: Negative for cough and shortness of breath. Cardiovascular: Negative for chest pain and leg swelling. Gastrointestinal: Negative for abdominal distention, abdominal pain, diarrhea, nausea and vomiting. Genitourinary: Negative for difficulty urinating, dysuria and urgency. Musculoskeletal: Negative for arthralgias, back pain and myalgias. Skin: Negative for color change and rash. Neurological: Negative for dizziness, numbness and headaches. Psychiatric/Behavioral: Negative for agitation, behavioral problems and confusion. Medications: Allergies:     Allergies   Allergen Reactions    Atorvastatin Other (See Comments)     Muscle cramps      Ciprofloxacin Itching    Other     Penicillins Diarrhea    Simvastatin Other (See Comments)     Muscle cramps      Statins     Sulfa Antibiotics        Current Meds:   Scheduled Meds:    magnesium oxide  400 mg Oral Daily    metOLazone  2.5 mg Oral Daily    aspirin  81 mg Oral QAM    docusate sodium  100 mg Oral QAM    levothyroxine  50 mcg Oral Daily    LORazepam  0.5 mg Oral Nightly    metoprolol succinate  25 mg Oral Daily    spironolactone  25 mg Oral Daily    sodium chloride flush  10 mL Intravenous 2 times per day    furosemide  40 mg Intravenous BID    warfarin (COUMADIN) daily dosing (placeholder)   Other RX Placeholder    potassium chloride  20 mEq Oral BID WC     Continuous Infusions:    dextrose       PRN Meds: midodrine, sodium chloride flush, acetaminophen **OR** acetaminophen, polyethylene glycol, potassium chloride **OR** potassium alternative oral replacement **OR** potassium chloride, glucose, dextrose, glucagon (rDNA), dextrose, hydrOXYzine    Data:     Past Medical History:   has a past medical history of Atrial fibrillation ([facility] Breast cancer, left breast ([facility] CAD (coronary artery disease), CHF (congestive heart failure) ([facility] [facility] Diabetes ([facility] [facility] Diabetes ([facility] [address] [facility] Female bladder prolapse, GERD (gastroesophageal reflux disease), HLD (hyperlipidemia), Hypertension, Hypothyroid, Hypothyroidism, Idiopathic cardiomyopathy ([facility] [address] [facility] Kidney disease, Low back pain, Personal history of breast cancer, Type 2 diabetes mellitus with neurologic complication ([facility] [address] [facility] and Vision abnormalities. Social History:   reports that she quit smoking about 21 years ago. She has a 5.00 pack-year smoking history. She has never used smokeless tobacco. She reports that she does not drink alcohol or use drugs.      Family History:   Family History   Problem Relation Age of Onset    Heart Disease Father     Stroke Father     Hypertension Father     Heart Failure Mother     Breast Cancer Neg Hx     Cancer Neg Hx     Colon Cancer Neg Hx     Diabetes Neg Hx     Eclampsia Neg Hx  Ovarian Cancer Neg Hx      Labor Neg Hx     Spont Abortions Neg Hx        Vitals:  /66   Pulse 75   Temp 97.4 °F (36.3 °C) (Axillary)   Resp 18   Ht 5' 3\" (1.6 m)   Wt 159 lb 2.8 oz (72.2 kg)   SpO2 98%   BMI 28.20 kg/m²   Temp (24hrs), Av.3 °F (36.3 °C), Min:97.2 °F (36.2 °C), Max:97.4 °F (36.3 °C)    Recent Labs     20  1301 20  1625 20  2049 20  0811   POCGLU 207* 202* 170* 101       I/O(24Hr): Intake/Output Summary (Last 24 hours) at 2020 0828  Last data filed at 2020 0549  Gross per 24 hour   Intake 360 ml   Output 3100 ml   Net -2740 ml       Labs:  [unfilled]    Lab Results   Component Value Date/Time    SPECIAL NOT REPORTED 2020 01:12 PM     Lab Results   Component Value Date/Time    CULTURE NO SIGNIFICANT GROWTH 2020 01:12 PM       [unfilled]    Radiology:    Xr Chest Standard (2 Vw)    Result Date: 3/18/2020  EXAMINATION: TWO XRAY VIEWS OF THE CHEST 3/18/2020 9:16 am COMPARISON: 2020 HISTORY: ORDERING SYSTEM PROVIDED HISTORY: follow up imaging TECHNOLOGIST PROVIDED HISTORY: follow up imaging Reason for Exam: sob, follow up imaging Acuity: Unknown Type of Exam: Subsequent/Follow-up FINDINGS: Cardiac silhouette is borderline enlarged. Dense atherosclerotic calcifications of the aortic arch. Implantable cardiac device leads overlying the right atrium and ventricle. Multifocal mixed interstitial airspace opacities are identified primarily within the upper lobes bilaterally. Small left pleural effusion adjacent atelectasis/airspace disease. Osseous structures and soft tissues are grossly intact. Multifocal mixed interstitial and airspace opacities within the upper lobes bilaterally, essentially unchanged as compared to previous examination.      Xr Chest Standard (2 Vw)    Result Date: 3/17/2020  EXAMINATION: TWO XRAY VIEWS OF THE CHEST 3/17/2020 5:25 pm COMPARISON: 1/3/2020 HISTORY: ORDERING SYSTEM PROVIDED HISTORY: sob TECHNOLOGIST PROVIDED HISTORY: sob Reason for Exam: Short of breath Acuity: Chronic Type of Exam: Ongoing Additional signs and symptoms: Short of breath FINDINGS: Left AICD. Chronic interstitial lung changes. Increased right upper lobe opacity. Increased perihilar opacities. Cardiomegaly. Increased right upper lobe perihilar opacities may be related to pulmonary edema versus an atypical infection. Xr Chest Portable    Result Date: 4/2/2020  EXAMINATION: ONE XRAY VIEW OF THE CHEST 4/2/2020 7:20 am COMPARISON: 03/18/2020 HISTORY: ORDERING SYSTEM PROVIDED HISTORY: SOB TECHNOLOGIST PROVIDED HISTORY: SOB Reason for Exam: SOB h/o COPD Acuity: Acute Type of Exam: Initial Additional signs and symptoms: SOB h/o COPD FINDINGS: Cardiomegaly. Multifocal airspace opacities within upper lobes bilaterally and left lower lobe remain essentially unchanged. Peripheral Kerley B-lines are suggestive of interstitial edema. Implantable cardiac device leads are in unchanged positioning. Generalized osteopenia. No obvious pleural effusion or pneumothorax. Cardiomegaly and subtle interstitial edema. Multifocal airspace opacities primarily in the upper lungs remain unchanged. Physical Examination:        Physical Exam  HENT:      Head: Normocephalic and atraumatic. Eyes:      Conjunctiva/sclera: Conjunctivae normal.      Pupils: Pupils are equal, round, and reactive to light. Cardiovascular:      Rate and Rhythm: Normal rate and regular rhythm. Heart sounds: Normal heart sounds. Pulmonary:      Effort: Pulmonary effort is normal.      Breath sounds: Normal breath sounds. Abdominal:      General: Bowel sounds are normal.      Palpations: Abdomen is soft. Musculoskeletal: Normal range of motion. Skin:     General: Skin is warm and dry. Capillary Refill: Capillary refill takes less than 2 seconds. Neurological:      Mental Status: She is alert and oriented to person, place, and time. recheck  -Continue home meds  -Synthroid 50 mcg     CKD Stage III  -Cr: 1.12  -Baseline around 0.94  -Monitor     Chronic Hypoxic Respiratory Failure  -Continue home oxygen     DVT Prophylaxis: Pharmacy to dose Coumadin  Diet: Cardiac diet. Low salt  PT/OT  Dispo: Social work for discharge planning. Patient Poor prognosis.  F/u palliative, hospice recs    Corina Beck MD  4/4/2020  8:28 AM

## 2020-04-04 NOTE — PROGRESS NOTES
Reviewed the EHR remotely. Reviewed the lab data and other entries. 2 D ECHO Jan 9, 2020:    Left ventricle is enlarged. Global left ventricular systolic function is  Moderately reduced. Estimated ejection fraction is 30 % . Normal left ventricular wall thickness. Grade II (moderate) left ventricular diastolic dysfunction. Left atrium is moderately dilated. Right atrial dilatation. Pacemaker / ICD lead seen in right atrium/right ventricle. Mild to moderate aortic insufficiency. Moderate to severe mitral regurgitation, with pulm vein showing systolic  blunting. Moderate tricuspid regurgitation. Moderate pulmonary hypertension with an estimated right ventricular systolic  pressure of 54 mmHg. Mild pulmonic insufficiency.       Patient is a [facility] Arrest code status currently. Will check CXR - PA & Lat views this AM.  Accordingly, will plan to switch to oral Lasix and hopefully plan to discharge to ECF ? later today. She will benefit from continued use of O2/NC. Discussed with Selina Cervantes, the Charge nurse over the phone. I did not see the patient today. Thank you.     Marcos Pro MD, [address] [facility]

## 2020-04-04 NOTE — CARE COORDINATION
BPCI-A 90-day Readmission Interview:    [place] DR    Bundle End Date: 2020    Patient/family seen: No    Current discharge plan: discharge plan on going    Collaboration completed with case management: Yes
DISCHARGE PLANNING NOTE:    Waiting for LSW to speak with patient, but it appears that [facility] is not an option for this patient as she does not have a skillable need per PT/OT recommendations. Possible that this patient will discharge to home later today and will have services through [facility]. Will wait for conversation with LSW.      Long Header - 25%    Electronically signed by Milagros Forrester RN on 4/4/2020 at 12:26 PM
JOSE learned that this patient would like to go to [facility]. JOSE faxed the referral and followed up with [facility] in admissions. She will review the referral and get back with this SW likely on Saturday. JOSE will follow.
ordered. ORANGE HEADER 22% Congestive Heart Failure. Will follow for needs. JENNI needs completed and signed. /rm                     Electronically signed by: Tyler Ling RN on 4/2/2020 at 6:55 PM
tablet 25 mg   Take 1 tablet by mouth 2 times daily   Quantity: 30 tablet Refills: 3       LORazepam (ATIVAN) 0.5 MG tablet 0.5 mg   Take 1 tablet by mouth nightly for 30 days. Quantity: 30 tablet Refills: 0       potassium chloride (KLOR-CON M) 20 MEQ extended release tablet 20 mEq   Take 1 tablet by mouth 2 times daily (with meals)   Quantity: 60 tablet Refills: 3           CONTINUE these medications which have NOT CHANGED     Medication Dose   levothyroxine (SYNTHROID) 50 MCG tablet    TAKE 1 TABLET DAILY   Quantity: 90 tablet Refills: 3       midodrine (PROAMATINE) 5 MG tablet 5 mg   Take 1 tablet by mouth 3 times daily as needed (for SBP < 110)   Quantity: 90 tablet Refills: 0       spironolactone (ALDACTONE) 25 MG tablet           !! warfarin (COUMADIN) 1 MG tablet 1 mg   Take 1 mg by mouth Twice a Week Indications: Mon/Fri Per [facility]       !! warfarin (COUMADIN) 1 MG tablet 2 mg   Take 2 mg by mouth Five times weekly Indications: Sun, Tues, Wed, Thurs, Sat Per [facility]   2 tablets       glimepiride (AMARYL) 2 MG tablet    TAKE 1 TABLET EVERY MORNING BEFORE BREAKFAST   Quantity: 90 tablet Refills: 4       nitroGLYCERIN (NITROSTAT) 0.4 MG SL tablet 0.4 mg   Place 0.4 mg under the tongue       docusate sodium (COLACE) 100 MG capsule 100 mg   Take 100 mg by mouth every morning        aspirin 81 MG EC tablet 81 mg   Take 81 mg by mouth every morning        cefUROXime (CEFTIN) 250 MG tablet 250 mg   Take 250 mg by mouth 2 times daily       colchicine (COLCRYS) 0.6 MG tablet 0.6 mg   Take 0.6 mg by mouth as needed        !! Potential duplicate medications found. Please discuss with provider.    STOP taking these previous medications     Medication Dose Reason for Stopping Comments   (STOP TAKING) sotalol (BETAPACE) 80 MG tablet 80 mg

## 2020-04-04 NOTE — DISCHARGE INSTR - DIET
 Good nutrition is important when healing from an illness, injury, or surgery. Follow any nutrition recommendations given to you during your hospital stay.  If you were given an oral nutrition supplement while in the hospital, continue to take this supplement at home. You can take it with meals, in-between meals, and/or before bedtime. These supplements can be purchased at most local grocery stores, pharmacies, and chain super-stores.  If you have any questions about your diet or nutrition, call the hospital and ask for the dietitian.     Cardiac

## 2020-04-06 NOTE — CARE COORDINATION
Patient called asking for a refill of Midodrine, she has 6 left. ACM explained refill was sent on 3.31.20. Spouse will call to check on the status of RX and call back with any concerns. Spouse denied any other needs today. Precautions reviewed for COVID-19 per CDC  Wash hands often with soap and water for at least 20 seconds  When soap is not available us hand  with at least 70% alcohol  Avoid touching your face  Avoid close contact with others  Maintain 6 feet distance if possible    If you are sick:  Cover mouth and nose when coughing or sneezing and then wash hands  Wear a mask when around others  Clean and disinfect surfaces often with soap or detergent and water.

## 2020-04-13 NOTE — CARE COORDINATION
8/10  Anticipated Goal Completion Date: 4.1.2020              Prior to Admission medications    Medication Sig Start Date End Date Taking? Authorizing Provider   acetaminophen (TYLENOL) 500 MG tablet Take 1 tablet by mouth 4 times daily as needed for Pain 4/4/20   Mildred Hodge MD   furosemide (LASIX) 80 MG tablet Take 1 tablet by mouth 2 times daily 4/4/20 5/4/20  Mildred Hodge MD   metOLazone (ZAROXOLYN) 2.5 MG tablet Take 1 tablet by mouth Twice a Week Monday and Thursday 4/6/20   Mildred Hodge MD   metoprolol succinate (TOPROL XL) 25 MG extended release tablet Take 1 tablet by mouth 2 times daily 4/4/20   Mildred Hodge MD   LORazepam (ATIVAN) 0.5 MG tablet Take 1 tablet by mouth nightly for 30 days.  4/4/20 5/4/20  Mildred Hodge MD   magnesium oxide (MAG-OX) 400 (241.3 Mg) MG TABS tablet Take 1 tablet by mouth daily 4/5/20   Mildred Hodge MD   potassium chloride (KLOR-CON M) 20 MEQ extended release tablet Take 1 tablet by mouth 2 times daily (with meals) 4/4/20   Mildred Hodge MD   levothyroxine (SYNTHROID) 50 MCG tablet TAKE 1 TABLET DAILY 4/1/20   Nina Howe MD   midodrine (PROAMATINE) 5 MG tablet Take 1 tablet by mouth 3 times daily as needed (for SBP < 110) 3/31/20   Nina Howe MD   cefUROXime (CEFTIN) 250 MG tablet Take 250 mg by mouth 2 times daily 2/28/20   Historical Provider, MD   colchicine (COLCRYS) 0.6 MG tablet Take 0.6 mg by mouth as needed 12/17/19   Historical Provider, MD   spironolactone (ALDACTONE) 25 MG tablet  3/9/20   Historical Provider, MD   warfarin (COUMADIN) 1 MG tablet Take 1 mg by mouth Twice a Week Indications: Mon/Fri Per [facility]    Historical Provider, MD   warfarin (COUMADIN) 1 MG tablet Take 2 mg by mouth Five times weekly Indications: Sun, Tues, Wed, Thurs, Sat Per [facility]   2 tablets    Historical Provider, MD   glimepiride (AMARYL) 2 MG tablet TAKE 1 TABLET EVERY MORNING BEFORE BREAKFAST 10/8/19   Nina Howe MD nitroGLYCERIN (NITROSTAT) 0.4 MG SL tablet Place 0.4 mg under the tongue 6/1/18   Historical Provider, MD   docusate sodium (COLACE) 100 MG capsule Take 100 mg by mouth every morning     Historical Provider, MD   aspirin 81 MG EC tablet Take 81 mg by mouth every morning     Historical Provider, MD       No future appointments. ,   Diabetes Assessment    Medic Alert ID:  No  Meal Planning:  Avoidance of concentrated sweets   How often do you test your blood sugar?:  Daily   Do you have barriers with adherence to non-pharmacologic self-management interventions?  (Nutrition/Exercise/Self-Monitoring):  No   Have you ever had to go to the ED for symptoms of low blood sugar?:  No       No patient-reported symptoms       and   Congestive Heart Failure Assessment    Are you currently restricting fluids?:  1800cc  Do you understand a low sodium diet?:  Yes  Do you understand how to read food labels?:  Yes  How many restaurant meals do you eat per week?:  1-2  Do you salt your food before tasting it?:  No         Symptoms:      Symptom course:  stable  Salt intake watch compared to last visit:  stable

## 2020-04-16 NOTE — CARE COORDINATION
PCP asked ACM to call patient to see if she understood the conversation that they had. Patient is confused about any medication changes but understood the fluid restriction and need for repeat labs in 1 week. ACM will clarify with PCP.

## 2020-04-16 NOTE — TELEPHONE ENCOUNTER
Patients son called asking for a refill on the patients lorazepam 0.5 mg tablet. Wanted to know if you would right a new script so that it can be filled at [facility]. Please advise .

## 2020-04-18 NOTE — TELEPHONE ENCOUNTER
I spoke to her today. She is taking just one glimepride per day. And she she is feeling better today.

## 2020-04-20 NOTE — PROGRESS NOTES
Chief Complaint   Patient presents with    Follow-Up from Hospital     f/u from [facility], pt states she still do not feel good she has been very tired      Visit Information    Have you changed or started any medications since your last visit including any over-the-counter medicines, vitamins, or herbal medicines? no   Are you having any side effects from any of your medications? -  no  Have you stopped taking any of your medications? Is so, why? -  no    Have you seen any other physician or provider since your last visit? No  Have you had any other diagnostic tests since your last visit? Yes - Records Obtained  Have you been seen in the emergency room and/or had an admission to a hospital since we last saw you? Yes - Records Obtained  Have you had your routine dental cleaning in the past 6 months? no    Have you activated your [employer] account? If not, what are your barriers?  No:      Patient Care Team:  Suzanne Kang MD as PCP - Owen Hamm MD as PCP - [facility] Provider  Shante Boothe MD as Consulting Physician (Internal Medicine Cardiovascular Disease)  Kareem Bernard MD as Consulting Physician (Internal Medicine)  Caesar Gomez RN as Ambulatory Care Manager  Apurva Vega RN as Care Transitions Nurse  Peter Jolley, RN as Care Transitions Nurse    Medical History Review  Past Medical, Family, and Social History reviewed and does not contribute to the patient presenting condition    Health Maintenance   Topic Date Due    DTaP/Tdap/Td vaccine (1 - Tdap) 09/14/1958    Shingles Vaccine (1 of 2) 09/14/1989    Annual Wellness Visit (AWV)  05/29/2019    TSH testing  04/02/2021    Potassium monitoring  04/14/2021    Creatinine monitoring  04/14/2021    DEXA (modify frequency per FRAX score)  Completed    Flu vaccine  Completed    Pneumococcal 65+ years Vaccine  Completed    Hepatitis A vaccine  Aged Out    Hib vaccine  Aged Out    Meningococcal (ACWY) vaccine  Aged Out

## 2020-04-20 NOTE — TELEPHONE ENCOUNTER
RN access contacted Minus MD [facility] office to schedule ED follow up appointment.      Pt is already schedule for 4/20/20 @ [phone]

## 2020-04-20 NOTE — PROGRESS NOTES
Post-Discharge Transitional Care Management Services or Hospital Follow Up      Rima Carrasco   YOB: 1940    Date of Office Visit:  4/20/2020  Date of Hospital Admission: 4/17/20  Date of Hospital Discharge: 4/17/20  Risk of hospital readmission (high >=14%.  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)    Female cystocele 2-3    Pessary maintenance    Breast cancer, left ([address]    Personal history of breast cancer    GERD (gastroesophageal reflux disease)    Congestive heart failure ([address]    CKD (chronic kidney disease) stage 3, GFR 30-59 ml/min ([facility]    Idiopathic cardiomyopathy ([address]    HLD (hyperlipidemia)    Coronary artery disease involving native coronary artery of native heart without angina pectoris    Essential hypertension    Type 2 diabetes mellitus without complication, without long-term current use of insulin ([address]    Acute congestive heart failure ([address]    Supratherapeutic INR    Hypertensive heart disease    Pacemaker    ICD (implantable cardioverter-defibrillator) in place    Acquired hypothyroidism    Chronic atrial fibrillation    Low back pain    Syncope and collapse    Chronic gout of right ankle    VT (ventricular tachycardia) ([address]    ICD (implantable cardioverter-defibrillator) discharge    Defibrillator discharge    Acute cystitis without hematuria    Congestive heart failure, NYHA class IV, acute on chronic, systolic ([address]    FH: CHF (congestive heart failure)    CHF (congestive heart failure), medications reconciled   - ND DISCHARGE MEDS RECONCILED W/ CURRENT OUTPATIENT MED LIST    2. Chronic systolic congestive heart failure (HCC)  - NYHA class IV, acute on chronic, combined    - Cardiologist consulted, overall very poor prognosis and increased risk for recurrent hospitalizations   - Palliative care was consulted, code status changed to DNR CCA without intubations  - Stable, breathing has improved with diuresis     3. Hyponatremia  - Worsening hyponatremia, patient offered admission to hospital though she declines  - Discussed case with Dr. Bijan Mcduffie, will discontinue Metolazone as most likely cause of hyponatremia  - Continue fluid restriction, repeat bmp 1 week, advised to present to hospital for new/worsening symptoms  -- Basic Metabolic Panel; Future    4. Hypokalemia  - Difficulty swallowing potassium tablets, will change to liquid formulation, repeat bmp 1 week  - Basic Metabolic Panel; Future    5. Essential hypertension  - Controlled  - Continue present management    6. Type 2 diabetes mellitus with diabetic neuropathy, without long-term current use of insulin (HCC)  - Amaryl discontinued by PCP due to concern of drug induced hyponatremia  - Dr. Bijan Mcduffie requesting to start Prandin 0.5 mg TID with meals       Medical Decision Making: high complexity    Return in about 6 weeks (around 6/1/2020), earlier if needed. Patient and son were educated on signs and symptoms which require more emergent evaluation and treatment, instructed to present to emergency room should these develop, they understand and agree with plan.     JAIRO Arteaga [facility]  4/21/2020, 9:58 AM

## 2020-04-22 NOTE — TELEPHONE ENCOUNTER
Patient notified, patient also called back and she thinks she has a UTI she has some burning.  Can you call something in for her. christy simon

## 2020-04-26 NOTE — ED PROVIDER NOTES
hallucinations, self-injury, sleep disturbance and suicidal ideas. PAST MEDICAL HISTORY     Past Medical History:   Diagnosis Date    [facility]     Breast cancer, left breast ([facility] 12/2/13    pt being seen at [place] [id number]. s/p lumpectomy    CAD (coronary artery disease)     non-obstructive    CHF (congestive heart failure) ([facility] 08/08/2014    inpt BP[facility]- EF 15-20% 7/25/16    Diabetes ([facility]     Diabetes ([facility]     non-insulin    Female bladder prolapse     GERD (gastroesophageal reflux disease) 4/23/2014    HLD (hyperlipidemia)     unable to tolerate meds    Hypertension     Hypothyroid     Hypothyroidism     Idiopathic cardiomyopathy ([facility]     severe    Kidney disease     stage II    Low back pain 9/26/2017    Personal history of breast cancer 12/2/13    left-following with Dr Melissa Fernandez Type 2 diabetes mellitus with neurologic complication ([facility] 23/19/9791    Vision abnormalities        SURGICAL HISTORY       Past Surgical History:   Procedure Laterality Date    APPENDECTOMY      ATRIAL ABLATION SURGERY      BREAST BIOPSY Left 12/2/13    Invasive Mucinous Cancer of Breast    BREAST LUMPECTOMY      Left breast    CARDIAC DEFIBRILLATOR PLACEMENT      pacemaker / AICD    CATARACT REMOVAL WITH IMPLANT Bilateral     CORONARY ANGIOPLASTY         CURRENT MEDICATIONS       Current Discharge Medication List      CONTINUE these medications which have NOT CHANGED    Details   nitrofurantoin, macrocrystal-monohydrate, (MACROBID) 100 MG capsule Take 1 capsule by mouth 2 times daily for 10 days  Qty: 20 capsule, Refills: 0      LORazepam (ATIVAN) 0.5 MG tablet Take 1 tablet by mouth nightly for 30 days.   Qty: 30 tablet, Refills: 0    Associated Diagnoses: Insomnia, unspecified type      potassium chloride 20 MEQ/15ML (10%) oral solution Take 15 mLs by mouth 2 times daily  Qty: 450 mL, Refills: 0      repaglinide (PRANDIN) 0.5 MG tablet Take 1 tablet by mouth 3 times daily (before meals)  Qty: 90 tablet, Refills: 3      acetaminophen (TYLENOL) 500 MG tablet Take 1 tablet by mouth 4 times daily as needed for Pain  Qty: 120 tablet, Refills: 3      furosemide (LASIX) 80 MG tablet Take 1 tablet by mouth 2 times daily  Qty: 60 tablet, Refills: 3      metoprolol succinate (TOPROL XL) 25 MG extended release tablet Take 1 tablet by mouth 2 times daily  Qty: 30 tablet, Refills: 3      magnesium oxide (MAG-OX) 400 (241.3 Mg) MG TABS tablet Take 1 tablet by mouth daily  Qty: 30 tablet, Refills: 1      levothyroxine (SYNTHROID) 50 MCG tablet TAKE 1 TABLET DAILY  Qty: 90 tablet, Refills: 3      midodrine (PROAMATINE) 5 MG tablet Take 1 tablet by mouth 3 times daily as needed (for SBP < 110)  Qty: 90 tablet, Refills: 0      spironolactone (ALDACTONE) 25 MG tablet       !! warfarin (COUMADIN) 1 MG tablet Take 1 mg by mouth Twice a Week Indications: Mon/Fri Per [facility]      !! warfarin (COUMADIN) 1 MG tablet Take 2 mg by mouth Five times weekly Indications: Sun, Tues, Wed, Thurs, Sat Per [facility]   2 tablets      nitroGLYCERIN (NITROSTAT) 0.4 MG SL tablet Place 0.4 mg under the tongue      docusate sodium (COLACE) 100 MG capsule Take 100 mg by mouth every morning       aspirin 81 MG EC tablet Take 81 mg by mouth every morning        !! - Potential duplicate medications found. Please discuss with provider. ALLERGIES     is allergic to atorvastatin; ciprofloxacin; other; penicillins; simvastatin; statins; and sulfa antibiotics. SOCIAL HISTORY      reports that she quit smoking about 21 years ago. She has a 5.00 pack-year smoking history. She has never used smokeless tobacco. She reports that she does not drink alcohol or use drugs.     PHYSICAL EXAM     INITIAL VITALS: /73   Pulse 74   Temp 97.4 °F (36.3 °C) (Oral)   Resp 16   Ht 5' 3\" (1.6 m)   Wt 152 lb (68.9 kg)   SpO2 97%   BMI 26.93 kg/m²      Physical Exam  Vitals signs and nursing note reviewed. Constitutional:       General: She is not in acute distress. Appearance: She is well-developed. She is not diaphoretic. HENT:      Head: Normocephalic and atraumatic. Eyes:      General: No scleral icterus. Right eye: No discharge. Left eye: No discharge. Conjunctiva/sclera: Conjunctivae normal.      Pupils: Pupils are equal, round, and reactive to light. Cardiovascular:      Rate and Rhythm: Normal rate and regular rhythm. Heart sounds: Normal heart sounds. No murmur. No friction rub. No gallop. Pulmonary:      Effort: Pulmonary effort is normal. No respiratory distress. Breath sounds: Normal breath sounds. No wheezing or rales. Chest:      Chest wall: No tenderness. Abdominal:      General: Bowel sounds are normal. There is no distension. Palpations: Abdomen is soft. There is no mass. Tenderness: There is no abdominal tenderness. There is no guarding or rebound. Musculoskeletal: Normal range of motion. General: No tenderness. Skin:     General: Skin is warm and dry. Coloration: Skin is not pale. Findings: No erythema or rash. Neurological:      Mental Status: She is alert and oriented to person, place, and time. Cranial Nerves: No cranial nerve deficit. Sensory: No sensory deficit. Motor: No abnormal muscle tone. Coordination: Coordination normal.      Deep Tendon Reflexes: Reflexes normal.   Psychiatric:         Behavior: Behavior normal.         Thought Content: Thought content normal.         Judgment: Judgment normal.         DIAGNOSTIC RESULTS     RADIOLOGY:All plain film, CT,MRI, and formal ultrasound images (except ED bedside ultrasound) are read by the radiologist and the interpretations are directly viewed by the emergency physician.    Xr Acute Abd Series Chest 1 Vw    Result Date: 4/26/2020  EXAMINATION: TWO XRAY VIEWS OF THE ABDOMEN AND SINGLE  XRAY VIEW OF

## 2020-04-27 NOTE — CARE COORDINATION
[facility] Transitions Follow Up Call    2020    Patient: Lauren Hicks  Patient : 1940   MRN: [id number]  Reason for Admission:   Discharge Date: 20 RARS: Readmission Risk Score: 26    Follow Up: Attempted to contact patient for BPCI-A and ED COVID-19 at risk follow up. Unable to reach patient. Male answered the phone who stated she is in the bathroom. Left contact information and request for call back.       Future Appointments   Date Time Provider Russ Cardona   2020  1:45 PM Shahab Lucero RN
touched surfaces.  Avoid all cruise travel and non-essential air travel.  Call your healthcare professional if you have concerns about COVID-19 and your underlying condition or if you are sick. For more information on steps you can take to protect yourself, see CDC's How to [id number] [place] [address] for follow-up call in 5-7 days based on severity of symptoms and risk factors.     Mark Porter, RN  Care Transition Nurse

## 2020-04-30 NOTE — CARE COORDINATION
Ambulatory Care Coordination Note  4/30/2020  CM Risk Score: 15  Charlson 10 Year Mortality Risk Score: 100%     ACC: Evy Hernadez RN    Summary Note: Spoke with patient and son. Per patient's son she is not eating very well. He is concerned with her becoming dehydrated due to the fluid restrictions and lasix. He thinks that is why she was constipated. She did have a BM today, he doesn't know if she should take the miralax. He asked if there is anything she can have to stimulate her appetite. ACM will route all of these questions to PCP and follow up with recommendations. Care Coordination Interventions    Program Enrollment:  Complex Care  Referral from Primary Care Provider:  No  Suggested Interventions and Community Resources  Cardiac Rehab:  Completed  Home Health Services:  Completed  Medication Assistance Program:  Completed (Comment: [employer] patient assistance)  Transportation Support:  Completed  Zone Management Tools:  Completed  Other Services or Interventions:  declined spiritual referral         Goals Addressed                 This Visit's Progress     Conditions and Symptoms   Improving     I will schedule office visits, as directed by my provider. I will keep my appointment or reschedule if I have to cancel. I will notify my provider of any barriers to my plan of care. I will follow my Zone Management tool to seek urgent or emergent care. I will notify my provider of any symptoms that indicate a worsening of my condition. Barriers: overwhelmed by complexity of regimen  Plan for overcoming my barriers: work with cc   Confidence: 8/10  Anticipated Goal Completion Date: 4.1.2020              Prior to Admission medications    Medication Sig Start Date End Date Taking?  Authorizing Provider   nitrofurantoin, macrocrystal-monohydrate, (MACROBID) 100 MG capsule Take 1 capsule by mouth 2 times daily for 10 days 4/24/20 5/4/20  Marcel Randolph MD   LORazepam (ATIVAN) 0.5 MG

## 2020-05-04 NOTE — PROGRESS NOTES
weight change. HENT: Negative for congestion, ear discharge, ear pain, hearing loss, rhinorrhea, sinus pain, sore throat, trouble swallowing and voice change. Eyes: Negative for pain, discharge, itching and visual disturbance. Respiratory: Negative for cough, chest tightness and shortness of breath. Cardiovascular: Positive for leg swelling (chronic, baseline). Negative for chest pain and palpitations. Gastrointestinal: Positive for constipation (improved). Negative for abdominal pain, blood in stool, diarrhea, nausea and vomiting. Endocrine: Negative for cold intolerance and heat intolerance. Genitourinary: Negative for difficulty urinating, dysuria, flank pain, frequency, hematuria and urgency. Musculoskeletal: Negative for arthralgias, back pain, neck pain and neck stiffness. Skin: Negative for color change, pallor and rash. Neurological: Negative for dizziness, weakness, light-headedness, numbness and headaches. Hematological: Negative for adenopathy. Does not bruise/bleed easily. Psychiatric/Behavioral: Positive for dysphoric mood. Negative for agitation, confusion, hallucinations, self-injury, sleep disturbance and suicidal ideas. The patient is nervous/anxious. The patient is not hyperactive.       PHYSICAL EXAM:      Vitals:    05/04/20 1129   BP: 92/64   Pulse: 64   SpO2: 92%   Weight: 151 lb (68.5 kg)   Height: 5' 2.99\" (1.6 m)     BP Readings from Last 3 Encounters:   05/04/20 92/64   04/26/20 101/73   04/20/20 104/72      Wt Readings from Last 3 Encounters:   05/04/20 151 lb (68.5 kg)   04/26/20 152 lb (68.9 kg)   04/20/20 152 lb (68.9 kg)     General - alert, well appearing, and in no distress  Skin - normal coloration and turgor, no rash  Eyes - pupils equal and reactive, extraocular eye movements intact  Mouth - mucous membranes are moist, pharynx normal without lesions  Neck - supple, no significant adenopathy, no palpable masses   Lymphatics - no palpable lymphadenopathy, no hepatosplenomegaly  Chest - clear to auscultation, no wheezes, rales or rhonchi, symmetric air entry  Heart - normal rate, rhythm is regular, murmur present  Abdomen - soft, nontender, nondistended  Neurological - alert, oriented x 3, normal speech, no focal sensory or motor deficit  Extremities - peripheral pulses normal, trace non pitting pedal edema alfonso    LABORATORY FINDINGS:    CBC:  Lab Results   Component Value Date    WBC 5.7 04/04/2020    HGB 12.4 04/04/2020     04/04/2020     02/17/2012     BMP:    Lab Results   Component Value Date     04/28/2020    K 3.1 04/28/2020    CL 72 04/28/2020    CO2 36 04/28/2020    BUN 44 04/28/2020    CREATININE 1.65 04/28/2020    GLUCOSE 251 04/28/2020    GLUCOSE 122 02/17/2012     HEMOGLOBIN A1C:   Lab Results   Component Value Date    LABA1C 6.1 01/04/2020     FASTING LIPID PANEL:  Lab Results   Component Value Date    CHOL 176 01/04/2020    HDL 74 01/04/2020    TRIG 59 01/04/2020     ASSESSMENT AND PLAN:      1. Chronic systolic congestive heart failure (HCC)  - NYHA class IV, acute on chronic, combined    - Compensated, weight is stable on Metolazone, recheck bmp    2. Hyponatremia  3. Hypokalemia  - Medications adjusted, K+ was increased, recheck bmp  - Basic Metabolic Panel; Future    4. Acute kidney injury superimposed on CKD ([facility]  - Basic Metabolic Panel; Future    5. Constipation, unspecified constipation type  - Improved, continue increased fiber in diet, Miralax as needed    6. Anxiety and depression  - Discussed treatment options, Remeron discussed but patient declines  - Continue Ativan as needed, discussed social interaction options during isolation, good family support    FOLLOW UP AND INSTRUCTIONS:   Return in about 4 weeks (around 6/1/2020), earlier if needed. Pineda Rodriguez received counseling on the following healthy behaviors: nutrition, exercise and medication adherence    Discussed use, benefit, and side effects of prescribed medications. Barriers to medication compliance addressed. All patient questions answered. Patient voiced understanding. Patient given educational materials - see patient instructions    Kristen Eng   [facility]  5/4/2020, 1:06 PM    Please note that this chart was generated using voice recognition Dragon dictation software. Although everyeffort was made to ensure the accuracy of this automated transcription, some errors in transcription may have occurred.

## 2020-05-05 NOTE — CARE COORDINATION
[facility] Transitions Follow Up Call    2020    Patient: Jyoti Howell  Patient : 1940   MRN: [id number]  Reason for Admission:   Discharge Date: 20 RARS: Readmission Risk Score: 26         Spoke with: Keisha Valentin, patient    Care Transitions Subsequent and Final Call    Subsequent and Final Calls  Do you have any ongoing symptoms?:  No  Have your medications changed?:  No  Do you have any questions related to your medications?:  No  Do you currently have any active services?:  No  Are you currently active with any services?:  Home Health  Do you have any needs or concerns that I can assist you with?:  No  Identified Barriers:  None  Care Transitions Interventions     Other Services:  Completed (Comment: ambulatory referral)   Other Interventions: Follow Up:  Contacted patient for BPCI-A follow up. Keisha Hanson stated she is doing \"better\". She stated she is moving around a little more but continues to have weakness and fatigue. She reports constipation has improved. Denies having any abdominal pain, nausea/vomiting. She is drinking prune juice and taking Miralax which she takes every other day. She stated she is moving her bowels with no issues. She reports BS have been \"fine\" since changing medications. This morning BS was 132. She denies having any chest pain/discomfort, increased shortness of breath. She does become short of breath when over exerting herself. She tries not to over exert herself. She reports she has minimal swelling in LE and gained 1 lb according to the PA who she saw yesterday. She does not weigh herself daily. Discussed importance of monitoring weight and when to contact MD with a weight gain of 3 lbs within 24 hours or 5 lbs within a week as well as if she has any new or worsening symptoms. She verbalized understanding. She confirmed she has all of her medications. She does not have any questions or concerns at this time.   Will continue to

## 2020-05-08 NOTE — ED PROVIDER NOTES
EMERGENCY DEPARTMENT ENCOUNTER    Pt Name: Kathryn Holt  MRN: [id number]  [place] 1940  Date of evaluation: 5/7/20  CHIEF COMPLAINT       Chief Complaint   Patient presents with    Abdominal Pain     HISTORY OF PRESENT ILLNESS   HPI    HISTORY OF PRESENT ILLNESS:  Past medical history of CAD presents for chief complaint of abdominal pain. Patient states she feels as if she has to burp. Denies any chest pain shortness of breath lightheadedness or dizziness. No hemoptysis. No vomiting or diarrhea. Severity is moderate. No aggravating or relieving factors. Timing is 1 day. Course constant.   Context is started when she was eating  -----------------------  -----------------------  REVIEW OF SYSTEMS  ED Caveat: [none]  Gen:  No fever, no chills  CV: No CP, no palpitations  Resp: No SOB, no respiratory distress  GI: No V/D, +abd pain  : No dysuria, no increased frequency  Skin: No rash, no purulent lesions  Eyes: No blurry vision, No double vision  MSK: No back pain, no joint pain  Neuro: No HA, no sensation changes  Psych: No SI/HI  -----------------------  -----------------------  ALLERGIES  -per nursing records, reviewed    PAST MEDICAL HISTORY  -See HPI    SOCIAL HISTORY  -No daily drinking, no IV drugs  -----------------------  -----------------------  PHYSICAL EXAM  Gen: Alert, no acute distress  Skin: Warm, no rashes  Head: Normocephalic, atraumatic  Neck: No midline tenderness, no nuchal rigidity  Eye: EOMI, PERRLA, normal conjunctiva  ENT: Mucous membranes moist, no pharyngeal erythema  CV: Normal rate, no rubs  Resp: Respirations unlabored, lungs clear to auscultation  GI: Soft, non distended, no large abdominal masses, non tender  MSK: No midline back pain, no large joint effusions  Neuro: Alert and oriented, no focal neurological deficits observed  Psych: Cooperative, appropriate mood and affect  -----------------------  -----------------------  MEDICAL DECISION MAKING  [facility] Anticoagulation Service   2 tablets     ALLERGIES     is allergic to atorvastatin; ciprofloxacin; other; penicillins; simvastatin; statins; and sulfa antibiotics. FAMILY HISTORY     She indicated that her mother is . She indicated that her father is . She indicated that her sister is . She indicated that her brother is . She indicated that the status of her neg hx is unknown.      SOCIAL HISTORY       Social History     Tobacco Use    Smoking status: Former Smoker     Packs/day: 0.50     Years: 10.00     Pack years: 5.00     Last attempt to quit: 1998     Years since quittin.6    Smokeless tobacco: Never Used   Substance Use Topics    Alcohol use: No     Alcohol/week: 0.0 standard drinks     Frequency: Never    Drug use: No     PHYSICAL EXAM     INITIAL VITALS: /70   Pulse 70   Temp 96.5 °F (35.8 °C) (Axillary)   Resp 17   Ht 5' 3\" (1.6 m)   Wt 151 lb (68.5 kg)   SpO2 99%   BMI 26.75 kg/m²    Physical Exam    MEDICAL DECISION MAKING:            Labs Reviewed   COMPREHENSIVE METABOLIC PANEL W/ REFLEX TO MG FOR LOW K - Abnormal; Notable for the following components:       Result Value    Glucose 198 (*)     BUN 29 (*)     CREATININE 1.85 (*)     Sodium 133 (*)     Chloride 91 (*)     Total Protein 8.5 (*)     GFR Non- 26 (*)     GFR  32 (*)     All other components within normal limits   URINE RT REFLEX TO CULTURE - Abnormal; Notable for the following components:    Turbidity UA CLOUDY (*)     Urine Hgb SMALL (*)     Leukocyte Esterase, Urine LARGE (*)     All other components within normal limits   LIPASE - Abnormal; Notable for the following components:    Lipase 156 (*)     All other components within normal limits   BRAIN NATRIURETIC PEPTIDE - Abnormal; Notable for the following components:    Pro-BNP 13,283 (*)     All other components within normal limits   TROPONIN - Abnormal; Notable for the following components:

## 2020-05-08 NOTE — ED NOTES
Pt ambulates to restroom for CCU.      Nader SteeleGeising[facility]  05/07/20 8050

## 2020-05-18 NOTE — CARE COORDINATION
overcoming my barriers: work with cc   Confidence: 8/10  Anticipated Goal Completion Date: 4.1.2020              Prior to Admission medications    Medication Sig Start Date End Date Taking? Authorizing Provider   midodrine (PROAMATINE) 5 MG tablet Take 1 tablet by mouth 3 times daily as needed (for SBP < 110) 5/18/20   Kavon Suazo MD   potassium chloride 20 MEQ/15ML (10%) oral solution TAKE 15 MLS BY MOUTH TWICE DAILY 5/18/20   Kavon Suazo MD   ondansetron (ZOFRAN) 4 MG tablet Take 1 tablet by mouth 3 times daily as needed for Nausea or Vomiting 5/8/20   Tanja Curling, MD   nitroGLYCERIN (NITROSTAT) 0.4 MG SL tablet Place 1 tablet under the tongue every 5 minutes as needed for Chest pain 5/4/20   Sanjay Hopkins PA-C   LORazepam (ATIVAN) 0.5 MG tablet Take 1 tablet by mouth nightly for 30 days.  4/24/20 5/24/20  Sanjay Hopkins PA-C   repaglinide (PRANDIN) 0.5 MG tablet Take 1 tablet by mouth 3 times daily (before meals) 4/20/20 4/20/21  Sanjay Hopkins PA-C   acetaminophen (TYLENOL) 500 MG tablet Take 1 tablet by mouth 4 times daily as needed for Pain 4/4/20   Anny Barron MD   furosemide (LASIX) 80 MG tablet Take 1 tablet by mouth 2 times daily 4/4/20 5/4/20  Anny Barron MD   metoprolol succinate (TOPROL XL) 25 MG extended release tablet Take 1 tablet by mouth 2 times daily 4/4/20   Anny Barron MD   magnesium oxide (MAG-OX) 400 (241.3 Mg) MG TABS tablet Take 1 tablet by mouth daily 4/5/20   Anny Barron MD   levothyroxine (SYNTHROID) 50 MCG tablet TAKE 1 TABLET DAILY 4/1/20   Kavon Suazo MD   spironolactone (ALDACTONE) 25 MG tablet  3/9/20   Historical Provider, MD   warfarin (COUMADIN) 1 MG tablet Take 1 mg by mouth Twice a Week Indications: Mon/Fri Per [facility]    Historical Provider, MD   warfarin (COUMADIN) 1 MG tablet Take 2 mg by mouth Five times weekly Indications: Sun, Tues, Wed, Thurs, Sat [facility]   2 tablets    Historical Provider,

## 2020-05-20 NOTE — TELEPHONE ENCOUNTER
Medication Requested: lorazepam    Last visit: 05/04/20  Next visit: 6/1/2020  Last refill: 04/24/20    Med contract on file:  [x] yes   [] no    Last urine drug screen:   Consistent with medication(s):    [] yes   [] no    Last OARRS ran: unk    Quantity of medication remaining: 3 days or so    Who will be picking rx up: please send    Pharmacy if escribed: [facility]

## 2020-06-01 NOTE — PROGRESS NOTES
[address] [address]  Dept: [phone]  Dept Fax: [phone]    OfficeProgress/Follow Up Note  Date of patient's visit: 6/2/2020  Patient's Name:  Vicente Romero YOB: 1940            Patient Care Team:  Lizett Senior MD as PCP - Satish Gandhi MD as PCP - [facility]  Kendall Burns MD as Consulting Physician (Internal Medicine Cardiovascular Disease)  Oscar Biswas MD as Consulting Physician (Internal Medicine)  Richie Dill, RN as Care Transitions Nurse  Cheri Montilla, RN as Care Transitions Nurse    REASON FOR VISIT:  Routine outpatient follow up    HISTORY OF PRESENT ILLNESS:      Chief Complaint   Patient presents with    Follow-up     6 week      History was obtained from the patient. Vicente Romero is a [de-identified] y.o. female here today for follow up on chronic medical conditions.     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)    Female cystocele 2-3    Pessary maintenance    Breast cancer, left (HCC)    Personal history of breast cancer    GERD (gastroesophageal reflux disease)    Congestive heart failure (HCC)    CKD (chronic kidney disease) stage 3, GFR 30-59 ml/min (HCC)    Idiopathic cardiomyopathy ([address]    HLD (hyperlipidemia)    Coronary artery disease involving native coronary artery of native heart without angina pectoris    Essential hypertension    Type 2 diabetes mellitus without complication, without long-term current use of insulin (HCC)    Acute congestive heart failure ([address]    Supratherapeutic INR    Hypertensive heart disease    Pacemaker    ICD (implantable cardioverter-defibrillator) in place    Acquired hypothyroidism    Chronic atrial fibrillation    Low back pain    Syncope and collapse    Chronic gout of right ankle    VT (ventricular tachycardia) ([address]    ICD (implantable intravenously      fluticasone (FLONASE) 50 MCG/ACT nasal spray 1 spray      glimepiride (AMARYL) 2 MG tablet       glucagon, rDNA, 1 MG injection Inject 1 mg into the muscle      insulin lispro (HUMALOG) 100 UNIT/ML injection vial Inject into the skin      magnesium sulfate 1-5 GM/100ML-% SOLN Infuse 1 g intravenously      polyethylene glycol (GLYCOLAX) 17 g packet Take 17 g by mouth      LORazepam (ATIVAN) 0.5 MG tablet Take 1 tablet by mouth nightly for 30 days. (Patient not taking: Reported on 6/1/2020) 30 tablet 0    ondansetron (ZOFRAN) 4 MG tablet Take 1 tablet by mouth 3 times daily as needed for Nausea or Vomiting (Patient not taking: Reported on 6/1/2020) 15 tablet 0    docusate sodium (COLACE) 100 MG capsule Take 100 mg by mouth every morning        No current facility-administered medications for this visit. ALLERGIES:      Allergies   Allergen Reactions    Atorvastatin Other (See Comments)     Muscle cramps      Ciprofloxacin Itching    Other     Penicillins Diarrhea    Simvastatin Other (See Comments)     Muscle cramps      Statins     Sulfa Antibiotics        SOCIAL HISTORY    Reviewed and no change from previous record. Jihan Prescott  reports that she quit smoking about 21 years ago. She has a 5.00 pack-year smoking history. She has never used smokeless tobacco.    FAMILY HISTORY:    Reviewed and No change from previous visit    REVIEW OF SYSTEMS:    Review of Systems   Constitutional: Positive for fatigue (improved). Negative for chills and fever. HENT: Negative for congestion, ear discharge, ear pain, hearing loss, rhinorrhea, sinus pain and sore throat. Eyes: Negative for pain, discharge, itching and visual disturbance. Respiratory: Negative for cough, chest tightness and shortness of breath. Cardiovascular: Positive for leg swelling. Negative for chest pain and palpitations.    Gastrointestinal: Negative for abdominal pain, blood in stool, constipation, diarrhea, nausea

## 2020-06-02 NOTE — CARE COORDINATION
[facility] Transitions Follow Up Call    2020    Patient: Natasha Vigil  Patient : 1940   MRN: [id number]  Reason for Admission:   Discharge Date: 20 RARS: Readmission Risk Score: 26         Spoke with: Hair Bassett, patient    Care Transitions Subsequent and Final Call    Subsequent and Final Calls  Are you currently active with any services?:  Home Health  Care Transitions Interventions     Other Services:  Completed (Comment: ambulatory referral)   Other Interventions: Follow Up:  Contacted patient for BPCI-A follow up. Renee Sanchez stated she is doing pretty good. Denies having any chest pain/discomfort, increased shortness of breath, swelling, weight gain. She reports she went to the doctor yesterday and everything looked good. She reports she does become short of breath when over exerting herself. She is eating and staying hydrated. She denies having any urinary or bowel issues. Reports blood sugars have been fine. She stated this morning her blood sugar was 122. Discussed signs/symptoms and when to contact MD with any new or worsening symptoms. She verbalized understanding. She has all of her medications. She does not have any questions or concerns at this time. Will continue to follow.       Future Appointments   Date Time Provider Russ Cardona   2020  1:00 PM Amarilis Leahy MD [facility]       Shayy Roach RN

## 2020-06-22 NOTE — TELEPHONE ENCOUNTER
Medication Requested: lorazepam    Last visit: 06/01/20  Next visit: 9/1/2020  Last refill: 05/22/20    Med contract on file:  [x] yes   [] no    Last urine drug screen: none on file  Consistent with medication(s):    [] yes   [] no    Last OARRS ran: unk    Quantity of medication remaining: thru Wednesday    Who will be picking rx up: self or son    Pharmacy if escribed: [facility]

## 2020-07-01 NOTE — PROGRESS NOTES
Chief Complaint   Patient presents with    Follow-Up from Hospital     f/u from [facility] for SOB pt states she still has SOB on and off     Other     Pt states she has itching all over her body     Anxiety     Visit Information    Have you changed or started any medications since your last visit including any over-the-counter medicines, vitamins, or herbal medicines? no   Are you having any side effects from any of your medications? -  no  Have you stopped taking any of your medications? Is so, why? -  no    Have you seen any other physician or provider since your last visit? Yes - Records Obtained  Have you had any other diagnostic tests since your last visit? Yes - Records Obtained  Have you been seen in the emergency room and/or had an admission to a hospital since we last saw you? Yes - Records Obtained  Have you had your routine dental cleaning in the past 6 months? no    Have you activated your [employer] account? If not, what are your barriers?  No:      Patient Care Team:  Nu Matthews MD as PCP - Cailin Perez MD as PCP - Sharath Contreras Provider  Anat Damon MD as Consulting Physician (Internal Medicine Cardiovascular Disease)  Abbie Riggs MD as Consulting Physician (Internal Medicine)    Medical History Review  Past Medical, Family, and Social History reviewed and does not contribute to the patient presenting condition    Health Maintenance   Topic Date Due    DTaP/Tdap/Td vaccine (1 - Tdap) 09/14/1958    Shingles Vaccine (1 of 2) 09/14/1989    Annual Wellness Visit (AWV)  05/29/2019    Flu vaccine (1) 09/01/2020    TSH testing  04/02/2021    Potassium monitoring  06/04/2021    Creatinine monitoring  06/04/2021    DEXA (modify frequency per FRAX score)  Completed    Pneumococcal 65+ years Vaccine  Completed    Hepatitis A vaccine  Aged Out    Hib vaccine  Aged Out    Meningococcal (ACWY) vaccine  Aged Out     SUBJECTIVE:  Coleen Casillas is a [de-identified] y.o. female patient who  comes for complaints of   Chief Complaint   Patient presents with    Follow-Up from Hospital     f/u from [facility] for SOB pt states she still has SOB on and off     Other     Pt states she has itching all over her body     Anxiety       Shortness of breath  Duration-  Severity-  Context-known CHF  Was seen in ER on 6/20 , and agoain on 6/28 for SOB- CHF exacerbation  Associated signs and symptoms-  Associated with nasal congestion,   Denies leg swelling currently  Modifying factors- imp[roved with iv lasix given in ER  On lasix po 80mg BID      TSH elevated to 7.7 in April 2020      Anxiety  Worsening her SOB  Takes ativan at night, which helps   Sleep, appetitie, mood poor  deneis SI/HI  Loneliness and strain relationship with   Son lives next door  Does not drink alcohol      Itching  Was started on macrobid for UTI for several days, came off 2-3 days ago  Itching is getting better now  Using only simple emollient        REVIEW OF SYSTEMS (except Subjective (HPI))  GENERAL: No fevers / chills  RESPIRATORY: positive for shortness of breath  CARDIOVASCULAR: Negative for chest pain or palpitations.   GI: no nausea, vomiting, or diarrhea  NEURO: No history of headaches    Past Medical History:   Diagnosis Date    Atrial fibrillation (HCC)     Breast cancer, left breast ([facility] 12/2/13    pt being seen at [place] [id number]. s/p lumpectomy    CAD (coronary artery disease)     non-obstructive    CHF (congestive heart failure) ([facility] 08/08/2014    inpt BPCH- EF 15-20% 7/25/16    Diabetes ([facility]     Diabetes ([address]     non-insulin    Female bladder prolapse     GERD (gastroesophageal reflux disease) 4/23/2014    HLD (hyperlipidemia)     unable to tolerate meds    Hypertension     Hypothyroid     Hypothyroidism     Idiopathic cardiomyopathy ([address]     severe    Kidney disease     stage II    Low back pain 9/26/2017    Personal history of breast cancer 12/2/13    left-following with Dr Derrick Kilpatrick facility-administered medications for this visit. OBJECTIVE:  Vitals:    07/01/20 1404   BP: 104/64   Pulse: 70   SpO2: 96%     Body mass index is 26.22 kg/m². General exam (except above):  General appearance - well appearing, alert, in no acute distress  Head - Atraumatic, normocephalic  Eyes - EOMI, no jaundice or pallor  Lungs - Lungs clear to auscultation. No wheezing, rhonchi, rales  Heart - RRR without murmur, gallop, or rubs. No ectopy  Abdomen - Abdomen soft, non-tender. Bowel sounds normal. No masses, organomegaly  Extremities -No significant edema, or skin discoloration. Good capillary refill. Neuro - Pt Alert, awake and oriented x 3. No gross focal neurological deficits    ASSESSMENT AND PLAN (MEDICAL DECISION MAKING):   [facility] was seen today for follow-up from hospital, other and anxiety. Diagnoses and all orders for this visit:    Itching    Nasal congestion  -     sodium chloride (ALTAMIST SPRAY) 0.65 % nasal spray; 1 spray by Nasal route as needed for Congestion    Chronic combined systolic and diastolic congestive heart failure (HCC)  Comments:  EF 30%    CKD (chronic kidney disease) stage 3, GFR 30-59 ml/min (HCC)    Anxiety and depression  -     citalopram (CELEXA) 10 MG tablet; Take 1 tablet by mouth daily    Acquired hypothyroidism  Comments:  TSH 7.7, likley contributing to depression  add 25mc on three days a week  Orders:  -     levothyroxine (SYNTHROID) 25 MCG tablet; Take 1 tablet by mouth three times a week On Mon Wed Fri  -     TSH; Future           Follow up in: 2wk    Greater than 25 minutes spent face-to-face with patient of which more than 50% was spent reviewing the past medical history, addressing the current concerns, counseling and formulating a mutual plan to help coordinate the care    This note was partially generated using Dragon voice recognition system, any errors noted are unintentional and are due to this technology.     Rodrigo Vieyra MD

## 2020-07-15 NOTE — CARE COORDINATION
Patient called to report that she is going to be changing PCP in August due to limited availability to see her PCP. She is having UTI symptoms, frequency and would like a urine culture ordered. Please advise.

## 2020-07-15 NOTE — TELEPHONE ENCOUNTER
Notified she does have a pending urine culture in her chart. She will go to the lab and complete this.

## 2020-07-20 NOTE — TELEPHONE ENCOUNTER
Medication Requested: ativan    Last visit: 07/01/20  Next visit: 9/1/2020  Last refill: 06/22/20    Med contract on file:  [] yes   [x] no    Last urine drug screen: none  Consistent with medication(s):    [] yes   [] no    Last OARRS ran: unk    Quantity of medication remaining: unk    Who will be picking rx up: please send in    Pharmacy if escribed: [facility]

## 2020-07-22 NOTE — TELEPHONE ENCOUNTER
Per pharmacy rx sent by Dr. Esdras Mcclellan for linezolid (ZYVOX) 600 MG tablet has a sever drug interaction w/ midodrine (PROAMATINE) 5 MG tablet       Please advise

## 2020-07-23 NOTE — TELEPHONE ENCOUNTER
Patients son called. He is very disappointed in the way his mothers care has been handled by this office lately. Its been almost 2 days she has been waiting for an antibiotic. He feels you should of know what antibiotic would not interact with her other medicines so she is not waiting and suffering. He would like an antibiotic sent in ASAP.     Allergies   Allergen Reactions    Atorvastatin Other (See Comments)     Muscle cramps      Ciprofloxacin Itching    Other     Penicillins Diarrhea    Simvastatin Other (See Comments)     Muscle cramps      Statins     Sulfa Antibiotics

## 2020-07-23 NOTE — TELEPHONE ENCOUNTER
Per Dr. Tino Cutler instructions, trimpex - trimethoprim 100 mg, bid x [facility] days [facility]  Andrew informed pt's son that medication was called into pharmacy. Writer also informed pts son per Dr. Pat Leroy, pt has too many allergies and it is very difficult to find an antibiotic for her. Pts son voiced understanding.

## 2020-08-04 NOTE — PROGRESS NOTES
Post-Discharge Transitional Care Management Services or Hospital Follow Up      Edith Madrid   YOB: 1940    Date of Office Visit:  8/4/2020  Date of Hospital Admission:   Date of Hospital Discharge: 6/20/20  Readmission Risk Score(high >=14%.  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)    Female cystocele 2-3    Pessary maintenance    Breast cancer, left ([address]    Personal history of breast cancer    GERD (gastroesophageal reflux disease)    Congestive heart failure ([address]    CKD (chronic kidney disease) stage 3, GFR 30-59 ml/min ([facility])    Idiopathic cardiomyopathy ([address]    HLD (hyperlipidemia)    Coronary artery disease involving native coronary artery of native heart without angina pectoris    Essential hypertension    Type 2 diabetes mellitus without complication, without long-term current use of insulin ([address]    Acute congestive heart failure ([address]    Supratherapeutic INR    Hypertensive heart disease    Pacemaker    ICD (implantable cardioverter-defibrillator) in place    Acquired hypothyroidism    Chronic atrial fibrillation    Low back pain    Syncope and collapse    Chronic gout of right ankle    VT (ventricular tachycardia) ([address]    ICD (implantable cardioverter-defibrillator) discharge    Defibrillator discharge    Acute cystitis without hematuria    Congestive heart failure, NYHA class IV, acute on chronic, systolic ([address]    FH: CHF (congestive heart failure)    CHF (congestive heart failure), NYHA class IV, acute on chronic, combined (HCC)    Prolonged Q-T interval on ECG       Allergies   Allergen Reactions    Atorvastatin Other (See Comments)     Muscle cramps      Ciprofloxacin Itching    Other     Penicillins Diarrhea    Simvastatin Other (See Comments)     Muscle cramps      Statins     Sulfa Antibiotics        Medications listed as ordered at the time of discharge from [facility] Medication Instructions [facility]:    Printed on:08/04/20 2077   Medication Information                      acetaminophen (TYLENOL) 500 MG tablet  Take 1 tablet by mouth 4 times daily as needed for Pain             aspirin 81 MG EC tablet  Take 81 mg by mouth every morning              citalopram (CELEXA) 10 MG tablet  Take 1 tablet by mouth daily             fluticasone (FLONASE) 50 MCG/ACT nasal spray  1 spray             furosemide (LASIX) 80 MG tablet  Take 1 tablet by mouth 2 times daily             insulin lispro (HUMALOG) 100 UNIT/ML injection vial  Inject into the skin             levothyroxine (SYNTHROID) 25 MCG tablet  TAKE 1 TABLET BY MOUTH 3 TIMES WEEKLY(MONDAY,WEDNESDAY,FRIDAY)             levothyroxine (SYNTHROID) 50 MCG tablet  Take 50 mcg by mouth daily             LORazepam (ATIVAN) 0.5 MG tablet  Take 1 tablet by mouth nightly for 30 days.              metoprolol succinate (TOPROL XL) 25 MG extended release tablet  TAKE 1 TABLET BY MOUTH TWICE DAILY             midodrine (PROAMATINE) 5 MG tablet  Take 1 tablet by mouth 3 times daily             nitroGLYCERIN (NITROSTAT) 0.4 MG SL tablet  Place 1 tablet under the tongue every 5 minutes as needed for Chest pain             ondansetron (ZOFRAN) 4 MG tablet  Take 1 tablet by mouth 3 times daily as needed for Nausea or Vomiting             potassium chloride 20 MEQ/15ML (10%) oral solution  TAKE 15 ML BY MOUTH TWICE DAILY             sodium bicarbonate 650 MG tablet  Take 1 tablet by mouth 2 times daily             warfarin (COUMADIN) 1 MG tablet  Take 1 mg by mouth Twice a Week Indications: Mon/Fri Per [facility] Anticoagulation [facility]             warfarin (COUMADIN) 1 MG tablet  Take 2 mg by mouth Five times weekly Indications: Sun, Tues, Wed, Thurs, Sat Per [facility] Anticoagulation [facility]   2 tablets                   Medications marked \"taking\" at this time  Outpatient Medications Marked as Taking for the 8/4/20 encounter (Office Visit) with Renato Khan MD   Medication Sig Dispense Refill    levothyroxine (SYNTHROID) 50 MCG tablet Take 50 mcg by mouth daily      LORazepam (ATIVAN) 0.5 MG tablet Take 1 tablet by mouth nightly for 30 days.  30 tablet 0    citalopram (CELEXA) 10 MG tablet Take 1 tablet by mouth daily 30 tablet 2    levothyroxine (SYNTHROID) 25 MCG tablet TAKE 1 TABLET BY MOUTH 3 TIMES WEEKLY(MONDAY,WEDNESDAY,FRIDAY) 38 tablet 11    sodium bicarbonate 650 MG tablet Take 1 tablet by mouth 2 times daily 60 tablet 3    fluticasone (FLONASE) 50 MCG/ACT nasal spray 1 spray      insulin lispro (HUMALOG) 100 UNIT/ML injection vial Inject into the skin      potassium chloride 20 MEQ/15ML (10%) oral solution TAKE 15 ML BY MOUTH TWICE DAILY 450 mL 0    metoprolol succinate (TOPROL XL) 25 MG extended release tablet TAKE 1 TABLET BY MOUTH TWICE DAILY 30 tablet 3    midodrine (PROAMATINE) 5 MG tablet Take 1 tablet by mouth 3 times daily 270 tablet 1    ondansetron (ZOFRAN) 4 MG tablet Take 1 tablet by mouth 3 times daily as needed for Nausea or Vomiting (Patient taking differently: Take 4 mg by mouth 3 times daily as needed for Nausea or Vomiting ) 15 tablet 0    nitroGLYCERIN (NITROSTAT) 0.4 MG SL tablet Place 1 tablet under the tongue every 5 minutes as needed for Chest pain (Patient taking differently: Place 0.4 mg under the tongue every 5 minutes as needed for Chest pain ) 25 tablet 2    acetaminophen (TYLENOL) 500 MG tablet Take 1 tablet by mouth 4 times daily as needed for Pain 120 tablet 3    furosemide (LASIX) 80 MG tablet Take 1 tablet by mouth 2 times daily (Patient taking differently: Take 40 mg by mouth 2 times daily ) 60 tablet 3    warfarin (COUMADIN) 1 MG tablet Take 1 mg by mouth Twice a Week Indications: Mon/Fri Per [facility] Antico[facility]      warfarin (COUMADIN) 1 MG tablet Take 2 mg by mouth Five times weekly Indications: Sun, Tues, Wed, Thurs, Sat Per [facility] Anticoagulation [facility]   2 tablets      aspirin 81 MG EC tablet Take 81 mg by mouth every morning           Medications patient taking as of now reconciled against medications ordered at time of hospital discharge: Yes    Chief Complaint   Patient presents with    Follow-Up from Hospital     F/u from [facility], Pt states she just do not feel good        She was recently seen at [facility] emergency room. . She stated that she has been having choking feeling. It is my clinical impression that it is atypical angina pectoris. It is also possible that this is an attack of anxiety. She had no GI symptoms like dysphagia odynophagia or esophageal reflux. She is a type II diabetic with a long history of coronary artery disease and congestive heart failure which is both systolic and diastolic and has had frequent admissions over the last 6 months      Inpatient course: Discharge summary reviewed- see chart. Interval history/Current status:     Review of Systems   Constitutional: Negative for fatigue (she has no symptoms ). HENT: Negative for sneezing. Eyes: Negative. Respiratory: Positive for shortness of breath. Cardiovascular: Negative for chest pain and palpitations. Syncope    Gastrointestinal: Negative for constipation. Endocrine: Negative. Genitourinary: Negative. Allergic/Immunologic: Negative. Neurological: Negative.         Vitals:    08/04/20 1352   BP: 96/64   Pulse: 71   Temp: 96.6 °F (35.9 °C)   SpO2: 97%   Weight: 143 lb (64.9 kg)   Height: Status: She is alert and oriented to person, place, and time. Cranial Nerves: No cranial nerve deficit. Motor: No tremor, atrophy, abnormal muscle tone or seizure activity. Coordination: Coordination normal.      Gait: Gait normal.   Psychiatric:         Speech: Speech normal.         Behavior: Behavior normal. Behavior is cooperative. Thought Content: Thought content normal.         Judgment: Judgment normal.             Assessment/Plan:   Diagnosis Orders   1. Chronic combined systolic and diastolic congestive heart failure (HCC) = is well compensated on present therapy ME DISCHARGE MEDS RECONCILED W/ CURRENT OUTPATIENT MED LIST   2. Coronary artery disease involving native coronary artery of native heart without angina pectoris = my impression that her choking sensation is repeat episodes of angina and she was advised to take nitroglycerin    3. Malignant neoplasm of left female breast, unspecified estrogen receptor status, unspecified site of breast ([facility] = in remission    4. Chronic atrial fibrillation = rate is well controlled    5. Anxiety = it also appears to me that she might have episodes of anxiety and she develops panic. He was advised to take lorazepam 0.5 mg, 3 times daily if needed    6. Panic type anxiety neurosis       Return in 1 month (on 9/4/2020) for Follow Up, heart failure, CAD. Orders Placed This Encounter   Procedures    ME DISCHARGE MEDS RECONCILED W/ CURRENT OUTPATIENT MED LIST     Orders Placed This Encounter   Medications    furosemide (LASIX) 80 MG tablet     Sig: Take 0.5 tablets by mouth 2 times daily     Dispense:  60 tablet     Refill:  5    LORazepam (ATIVAN) 0.5 MG tablet     Sig: Take 1 tablet by mouth 3 times daily as needed for up to 60 days.      Dispense:  180 tablet     Refill:  0     Time spent to review her record and multiple medical problem was more than 40 minutes

## 2020-08-04 NOTE — PROGRESS NOTES
Subjective:      Chief Complaint   Patient presents with    Follow-Up from Hospital     F/u from [facility], Pt states she just do not feel good        Patient ID: Merle Campbell is a [de-identified] y.o. female. Visit Information    Have you changed or started any medications since your last visit including any over-the-counter medicines, vitamins, or herbal medicines? no   Are you having any side effects from any of your medications? -  no  Have you stopped taking any of your medications? Is so, why? -  no    Have you seen any other physician or provider since your last visit? Yes - Records Obtained  Have you had any other diagnostic tests since your last visit? Yes - Records Obtained  Have you been seen in the emergency room and/or had an admission to a hospital since we last saw you? Yes - Records Obtained  Have you had your routine dental cleaning in the past 6 months? no    Have you activated your [employer] account? If not, what are your barriers?  No:      Patient Care Team:  Antonio Balbuena MD as PCP - Ebony Logan MD as PCP - [facility]  Barak Crystal MD as Consulting Physician (Internal Medicine Cardiovascular Disease)  Heather Rodriguez MD as Consulting Physician (Internal Medicine)    Medical History Review  Past Medical, Family, and Social History reviewed and does not contribute to the patient presenting condition    Health Maintenance   Topic Date Due    DTaP/Tdap/Td vaccine (1 - Tdap) 09/14/1959    Shingles Vaccine (1 of 2) 09/14/1989    Annual Wellness Visit (AWV)  05/29/2019    Flu vaccine (1) 09/01/2020    Potassium monitoring  07/04/2021    Creatinine monitoring  07/04/2021    TSH testing  07/15/2021    DEXA (modify frequency per FRAX score)  Completed    Pneumococcal 65+ yrs at Risk Vaccine  Completed    Hepatitis A vaccine  Aged Out    Hib vaccine  Aged Out    Meningococcal (ACWY) vaccine  Aged Out       HPI    Review of Systems    Objective:   Physical Exam    Assessment / Plan:

## 2020-09-15 NOTE — PROGRESS NOTES
without complication, without long-term current use of insulin ([address]     2. Essential hypertension     3. CHF (congestive heart failure), NYHA class IV, acute on chronic, combined ([address]     4. CKD (chronic kidney disease) stage 3, GFR 30-59 ml/min ([facility])         PLAN:  Continue same meds. No changes other than parameters that cardiology has now set. BP was above 100 today and is feeling okay so continue with therapy.

## 2020-09-22 NOTE — PROGRESS NOTES
Juan Jose Sam is a 79 y.o. female being seen for her weekly follow up. Location of visit: [facility]    HPI:  New today:Pt states she just doesn't feel good. Able to walk better per therapy. Does say she feels a little better today. No new issues. Midodrine was increased to tid ATC (hold for SBP > 120) and spironolactone was increased to 50 mg daily as pt continued to have increased SOB and fatigue with low BP. Is feeling a little better today. Review of Systems   Constitutional: Positive for fatigue. Negative for activity change, appetite change, chills, diaphoresis and fever. HENT: Negative for congestion. Respiratory: Positive for shortness of breath (although sats are good even with activity). Negative for cough. Cardiovascular: Negative for chest pain and palpitations. Gastrointestinal: Negative for diarrhea, nausea and vomiting. Genitourinary: Negative for hematuria. Musculoskeletal: Negative for arthralgias and myalgias. Skin: Negative for rash and wound. Neurological: Positive for weakness. Negative for headaches. Psychiatric/Behavioral: Negative for agitation, dysphoric mood and sleep disturbance. The patient is not nervous/anxious. Physical Exam  Vitals signs reviewed. Constitutional:       General: She is not in acute distress. HENT:      Head: Normocephalic and atraumatic. Nose: No congestion or rhinorrhea. Eyes:      General:         Right eye: No discharge. Left eye: No discharge. Cardiovascular:      Rate and Rhythm: Normal rate and regular rhythm. Pulmonary:      Effort: Pulmonary effort is normal. No respiratory distress. Breath sounds: Normal breath sounds. Abdominal:      Palpations: Abdomen is soft. Tenderness: There is no abdominal tenderness. Musculoskeletal:      Right lower leg: No edema. Left lower leg: No edema. Skin:     General: Skin is warm and dry.    Neurological:      Mental Status: She is

## 2022-05-10 NOTE — ED NOTES
Bed: 22  Expected date: 1/3/20  Expected time: 8:11 PM  Means of arrival: [facility]  Comments:  Lisa Dorman RN  01/03/20 2030 Fax received from [facility] Isaura Iqbal asking for more information for a PA that was started. Paperwork faxed to PA team for completion.